# Patient Record
Sex: FEMALE | Race: WHITE | Employment: UNEMPLOYED | URBAN - METROPOLITAN AREA
[De-identification: names, ages, dates, MRNs, and addresses within clinical notes are randomized per-mention and may not be internally consistent; named-entity substitution may affect disease eponyms.]

---

## 2018-11-05 ENCOUNTER — HOSPITAL ENCOUNTER (EMERGENCY)
Age: 48
Discharge: HOME | End: 2018-11-05
Attending: EMERGENCY MEDICINE
Payer: MEDICARE

## 2018-11-05 ENCOUNTER — APPOINTMENT (OUTPATIENT)
Dept: ULTRASOUND IMAGING | Age: 48
End: 2018-11-05
Attending: EMERGENCY MEDICINE
Payer: MEDICARE

## 2018-11-05 VITALS
BODY MASS INDEX: 48.82 KG/M2 | TEMPERATURE: 98.6 F | SYSTOLIC BLOOD PRESSURE: 134 MMHG | OXYGEN SATURATION: 95 % | RESPIRATION RATE: 20 BRPM | HEART RATE: 87 BPM | WEIGHT: 293 LBS | HEIGHT: 65 IN | DIASTOLIC BLOOD PRESSURE: 79 MMHG

## 2018-11-05 DIAGNOSIS — I87.2 VENOUS STASIS DERMATITIS OF BOTH LOWER EXTREMITIES: ICD-10-CM

## 2018-11-05 DIAGNOSIS — L03.119 CELLULITIS OF LOWER EXTREMITY, UNSPECIFIED LATERALITY: ICD-10-CM

## 2018-11-05 DIAGNOSIS — R60.9 PERIPHERAL EDEMA: Primary | ICD-10-CM

## 2018-11-05 LAB
ALBUMIN SERPL-MCNC: 3.6 G/DL (ref 3.5–5)
ALBUMIN/GLOB SERPL: 0.9 {RATIO}
ALP SERPL-CCNC: 101 U/L (ref 50–130)
ALT SERPL-CCNC: 28 U/L (ref 12–65)
ANION GAP SERPL CALC-SCNC: 10 MMOL/L
AST SERPL-CCNC: 26 U/L (ref 15–37)
BASOPHILS # BLD: 0 K/UL (ref 0–0.2)
BASOPHILS NFR BLD: 0 % (ref 0–2)
BILIRUB SERPL-MCNC: 0.3 MG/DL (ref 0.2–1.1)
BNP SERPL-MCNC: 23 PG/ML
BUN SERPL-MCNC: 7 MG/DL (ref 6–23)
CALCIUM SERPL-MCNC: 9.2 MG/DL (ref 8.3–10.4)
CHLORIDE SERPL-SCNC: 100 MMOL/L (ref 98–107)
CO2 SERPL-SCNC: 27 MMOL/L (ref 21–32)
CREAT SERPL-MCNC: 0.81 MG/DL (ref 0.6–1)
CRP SERPL-MCNC: 2.5 MG/DL (ref 0–0.9)
DIFFERENTIAL METHOD BLD: ABNORMAL
EOSINOPHIL # BLD: 0 K/UL (ref 0–0.8)
EOSINOPHIL NFR BLD: 0 % (ref 0.5–7.8)
ERYTHROCYTE [DISTWIDTH] IN BLOOD BY AUTOMATED COUNT: 14.9 %
GLOBULIN SER CALC-MCNC: 4.2 G/DL (ref 2.3–3.5)
GLUCOSE SERPL-MCNC: 111 MG/DL (ref 65–100)
HCT VFR BLD AUTO: 38 % (ref 35.8–46.3)
HGB BLD-MCNC: 12 G/DL (ref 11.7–15.4)
IMM GRANULOCYTES # BLD: 0.1 K/UL (ref 0–0.5)
IMM GRANULOCYTES NFR BLD AUTO: 1 % (ref 0–5)
LYMPHOCYTES # BLD: 0.8 K/UL (ref 0.5–4.6)
LYMPHOCYTES NFR BLD: 14 % (ref 13–44)
MAGNESIUM SERPL-MCNC: 2.3 MG/DL (ref 1.8–2.4)
MCH RBC QN AUTO: 27.8 PG (ref 26.1–32.9)
MCHC RBC AUTO-ENTMCNC: 31.6 G/DL (ref 31.4–35)
MCV RBC AUTO: 88.2 FL (ref 79.6–97.8)
MONOCYTES # BLD: 0.5 K/UL (ref 0.1–1.3)
MONOCYTES NFR BLD: 8 % (ref 4–12)
NEUTS SEG # BLD: 4.6 K/UL (ref 1.7–8.2)
NEUTS SEG NFR BLD: 77 % (ref 43–78)
NRBC # BLD: 0 K/UL (ref 0–0.2)
PLATELET # BLD AUTO: 405 K/UL (ref 150–450)
PMV BLD AUTO: 8.6 FL (ref 9.4–12.3)
POTASSIUM SERPL-SCNC: 4.8 MMOL/L (ref 3.5–5.1)
PROT SERPL-MCNC: 7.8 G/DL
RBC # BLD AUTO: 4.31 M/UL (ref 4.05–5.2)
SODIUM SERPL-SCNC: 137 MMOL/L (ref 136–145)
VALPROATE SERPL-MCNC: 52 UG/ML (ref 50–100)
WBC # BLD AUTO: 6 K/UL (ref 4.3–11.1)

## 2018-11-05 PROCEDURE — 93970 EXTREMITY STUDY: CPT

## 2018-11-05 PROCEDURE — 99283 EMERGENCY DEPT VISIT LOW MDM: CPT | Performed by: EMERGENCY MEDICINE

## 2018-11-05 PROCEDURE — 80164 ASSAY DIPROPYLACETIC ACD TOT: CPT

## 2018-11-05 PROCEDURE — 86140 C-REACTIVE PROTEIN: CPT

## 2018-11-05 PROCEDURE — 83880 ASSAY OF NATRIURETIC PEPTIDE: CPT

## 2018-11-05 PROCEDURE — 74011250636 HC RX REV CODE- 250/636: Performed by: EMERGENCY MEDICINE

## 2018-11-05 PROCEDURE — 80053 COMPREHEN METABOLIC PANEL: CPT

## 2018-11-05 PROCEDURE — 85025 COMPLETE CBC W/AUTO DIFF WBC: CPT

## 2018-11-05 PROCEDURE — 74011000258 HC RX REV CODE- 258: Performed by: EMERGENCY MEDICINE

## 2018-11-05 PROCEDURE — 96365 THER/PROPH/DIAG IV INF INIT: CPT | Performed by: EMERGENCY MEDICINE

## 2018-11-05 PROCEDURE — 96375 TX/PRO/DX INJ NEW DRUG ADDON: CPT | Performed by: EMERGENCY MEDICINE

## 2018-11-05 PROCEDURE — 83735 ASSAY OF MAGNESIUM: CPT

## 2018-11-05 RX ORDER — POTASSIUM CHLORIDE 750 MG/1
10 CAPSULE, EXTENDED RELEASE ORAL 2 TIMES DAILY
Qty: 40 CAP | Refills: 0 | Status: SHIPPED | OUTPATIENT
Start: 2018-11-05

## 2018-11-05 RX ORDER — FUROSEMIDE 10 MG/ML
40 INJECTION INTRAMUSCULAR; INTRAVENOUS
Status: COMPLETED | OUTPATIENT
Start: 2018-11-05 | End: 2018-11-05

## 2018-11-05 RX ORDER — FUROSEMIDE 40 MG/1
40 TABLET ORAL DAILY
Qty: 20 TAB | Refills: 0 | Status: SHIPPED | OUTPATIENT
Start: 2018-11-05 | End: 2018-11-25

## 2018-11-05 RX ORDER — CEPHALEXIN 500 MG/1
500 CAPSULE ORAL 3 TIMES DAILY
Qty: 21 CAP | Refills: 0 | Status: SHIPPED | OUTPATIENT
Start: 2018-11-05 | End: 2018-11-12

## 2018-11-05 RX ADMIN — CEFTRIAXONE 1 G: 1 INJECTION, POWDER, FOR SOLUTION INTRAMUSCULAR; INTRAVENOUS at 14:35

## 2018-11-05 RX ADMIN — FUROSEMIDE 40 MG: 10 INJECTION, SOLUTION INTRAMUSCULAR; INTRAVENOUS at 14:33

## 2018-11-05 NOTE — PROGRESS NOTES
Call to [facility] and spoke to [facility] who asked that I fax the referral since they are not on [address] and she will get with scheduling and call me back with an appointment. Referral faxed. Call to mother and let her know but her voicemail is full.

## 2018-11-05 NOTE — ED NOTES
I have reviewed discharge instructions with the patient, mother and aunt. The patient, parent and aunt verbalized understanding. Patient left ED via Discharge Method: wheelchair to Home with mother and aunt. Opportunity for questions and clarification provided. Patient given 3 scripts. To continue your aftercare when you leave the hospital, you may receive an automated call from our care team to check in on how you are doing. This is a free service and part of our promise to provide the best care and service to meet your aftercare needs.  If you have questions, or wish to unsubscribe from this service please call [phone]. Thank you for Choosing our [facility] Emergency Department.

## 2018-11-05 NOTE — PROGRESS NOTES
Visited with patient at request of Dr. Claribel Salgado. Mother Rishi Patrick and aunt Matthew Odom ([phone]) at bedside. Patient lives at home with her mother. Per patient and mom patient is completely independent in all activities of daily living. States she uses nothing for ambulation assistance. Mom states she does not have any past medical history and only past surgical history is a hernia when she was a child. Mom states patient does not have a PCP and only sees psychiatrist Kelton Dakin every 3 months for schizophrenia. Patient responses to any questions in addition to what she voices appears to be very childlike. Her mother offers no reason for this except for the schizophrenia. I discussed follow-up with PCP from this emergency room visit and patient's mother says she goes to [facility] and would like for patient to go there as well. Notified mother I would attempt to make an appointment for patient for follow-up at the end of this week per Dr. Claribel Salgado and get back to her. Call to [facility] and spoke to [facility] who asked that I fax the referral since they are not on Care and she will get with scheduling and call me back with an appointment. mother and let her know but her voicemail is full.

## 2018-11-05 NOTE — ED TRIAGE NOTES
Pt has had swelling to bilateral feet and legs for about two weeks but getting worse. Some redness to legs also.

## 2018-11-05 NOTE — ED PROVIDER NOTES
Patient presents to ER with family for concern about leg swelling. Family states patient \"have swelling in bilateral legs approximately 2 weeks ago. She had been prescribed \"fluid pills\" by her psychiatrist without any improvement. The history is provided by the patient. The history is limited by a developmental delay. Leg Pain This is a new problem. The current episode started more than 1 week ago. The problem has not changed since onset. The pain is present in the right lower leg and left lower leg. The quality of the pain is described as aching. The pain is at a severity of 5/10. The pain is moderate. Pertinent negatives include no numbness, no tingling and no back pain. She has tried nothing for the symptoms. There has been no history of extremity trauma. History reviewed. No pertinent past medical history. History reviewed. No pertinent surgical history. History reviewed. No pertinent family history. Social History Socioeconomic History  Marital status: SINGLE Spouse name: Not on file  Number of children: Not on file  Years of education: Not on file  Highest education level: Not on file Social Needs  Financial resource strain: Not on file  Food insecurity - worry: Not on file  Food insecurity - inability: Not on file  Transportation needs - medical: Not on file  Transportation needs - non-medical: Not on file Occupational History  Not on file Tobacco Use  Smoking status: Never Smoker Substance and Sexual Activity  Alcohol use: No  
  Frequency: Never  Drug use: Not on file  Sexual activity: Not on file Other Topics Concern  Not on file Social History Narrative  Not on file ALLERGIES: Patient has no known allergies. Review of Systems Constitutional: Negative for diaphoresis and fatigue. HENT: Negative for congestion and dental problem. Eyes: Negative for photophobia, redness and visual disturbance. Respiratory: Negative for choking and chest tightness. Cardiovascular: Negative for palpitations and leg swelling. Gastrointestinal: Negative for abdominal pain and anal bleeding. Genitourinary: Negative for flank pain, frequency and urgency. Musculoskeletal: Negative for back pain and gait problem. Skin: Positive for color change. Neurological: Negative for tingling, speech difficulty and numbness. Hematological: Negative for adenopathy. Does not bruise/bleed easily. Psychiatric/Behavioral: Negative for behavioral problems and confusion. All other systems reviewed and are negative. Vitals:  
 11/05/18 1133 BP: 134/79 Pulse: (!) 116 Resp: 20 Temp: 98.6 °F (37 °C) SpO2: 92% Weight: 148.8 kg (328 lb) Height: 5' 5\" (1.651 m) Physical Exam  
Constitutional: She appears well-developed and well-nourished. HENT:  
Head: Normocephalic and atraumatic. Eyes: EOM are normal. Pupils are equal, round, and reactive to light. Cardiovascular: Normal rate and regular rhythm. Pulmonary/Chest: Effort normal and breath sounds normal. No stridor. No respiratory distress. Musculoskeletal: Normal range of motion. She exhibits edema. She exhibits no deformity. Right lower leg: She exhibits swelling and edema. Left lower leg: She exhibits swelling and edema. Legs: 
Nursing note and vitals reviewed. MDM Number of Diagnoses or Management Options Diagnosis management comments: Differential diagnosis includes DVT, volume overload 2:42 PM 
Ultrasound is negative for DVT. Normal basic labs including a normal white blood cell count. Normal kidney function. CRP is only 2. Discussed results of labs in detail with patient and family. I feel patient likely needs diuresis as well as antibiotics for possible dermatitis versus cellulitis. Discharged home on Lasix as well as Keflex. Encouraged close follow-up with PCP Amount and/or Complexity of Data Reviewed Clinical lab tests: ordered and reviewed Tests in the radiology section of CPT®: ordered and reviewed Risk of Complications, Morbidity, and/or Mortality Presenting problems: moderate Diagnostic procedures: low Management options: low Procedures Voice dictation software was used during the making of this note. This software is not perfect and grammatical and other typographical errors may be present. This note has been proofread, but may still contain errors.  
Mayra Amaro MD; 11/5/2018 @2:42 PM  
===================================================================

## 2018-11-05 NOTE — DISCHARGE INSTRUCTIONS
Take medications as prescribed  Keep legs elevated when sitting  Call and arrange follow-up with a primary care physician  Return to the ER for any new or worsening symptoms     Cellulitis: Care Instructions  Your Care Instructions    Cellulitis is a skin infection caused by bacteria, most often strep or staph. It often occurs after a break in the skin from a scrape, cut, bite, or puncture, or after a rash. Cellulitis may be treated without doing tests to find out what caused it. But your doctor may do tests, if needed, to look for a specific bacteria, like methicillin-resistant Staphylococcus aureus (MRSA). The doctor has checked you carefully, but problems can develop later. If you notice any problems or new symptoms, get medical treatment right away. Follow-up care is a key part of your treatment and safety. Be sure to make and go to all appointments, and call your doctor if you are having problems. It's also a good idea to know your test results and keep a list of the medicines you take. How can you care for yourself at home? · Take your antibiotics as directed. Do not stop taking them just because you feel better. You need to take the full course of antibiotics. · Prop up the infected area on pillows to reduce pain and swelling. Try to keep the area above the level of your heart as often as you can. · If your doctor told you how to care for your wound, follow your doctor's instructions. If you did not get instructions, follow this general advice:  ? Wash the wound with clean water 2 times a day. Don't use hydrogen peroxide or alcohol, which can slow healing. ? You may cover the wound with a thin layer of petroleum jelly, such as Vaseline, and a nonstick bandage. ? Apply more petroleum jelly and replace the bandage as needed. · Be safe with medicines. Take pain medicines exactly as directed. ? If the doctor gave you a prescription medicine for pain, take it as prescribed.   ? If you are not taking a prescription pain medicine, ask your doctor if you can take an over-the-counter medicine. To prevent cellulitis in the future  · Try to prevent cuts, scrapes, or other injuries to your skin. Cellulitis most often occurs where there is a break in the skin. · If you get a scrape, cut, mild burn, or bite, wash the wound with clean water as soon as you can to help avoid infection. Don't use hydrogen peroxide or alcohol, which can slow healing. · If you have swelling in your legs (edema), support stockings and good skin care may help prevent leg sores and cellulitis. · Take care of your feet, especially if you have diabetes or other conditions that increase the risk of infection. Wear shoes and socks. Do not go barefoot. If you have athlete's foot or other skin problems on your feet, talk to your doctor about how to treat them. When should you call for help? Call your doctor now or seek immediate medical care if:    · You have signs that your infection is getting worse, such as:  ? Increased pain, swelling, warmth, or redness. ? Red streaks leading from the area. ? Pus draining from the area. ? A fever.     · You get a rash.    Watch closely for changes in your health, and be sure to contact your doctor if:    · You do not get better as expected. Where can you learn more? Go to http://ernestina-rachel.info/. Leilani Antunez in the search box to learn more about \"Cellulitis: Care Instructions. \"  Current as of: April 18, 2018  Content Version: 11.8  © 8605-4175 [employer]. Care instructions adapted under license by [employer] (which disclaims liability or warranty for this information). If you have questions about a medical condition or this instruction, always ask your healthcare professional. [place] [address] any warranty or liability for your use of this information.            Leg and Ankle Edema: Care Instructions  Your Care Instructions  Swelling in the legs, ankles, and feet is called edema. It is common after you sit or stand for a while. Long plane flights or car rides often cause swelling in the legs and feet. You may also have swelling if you have to stand for long periods of time at your job. Problems with the veins in the legs (varicose veins) and changes in hormones can also cause swelling. Sometimes the swelling in the ankles and feet is caused by a more serious problem, such as heart failure, infection, blood clots, or liver or kidney disease. Follow-up care is a key part of your treatment and safety. Be sure to make and go to all appointments, and call your doctor if you are having problems. It's also a good idea to know your test results and keep a list of the medicines you take. How can you care for yourself at home? · If your doctor gave you medicine, take it as prescribed. Call your doctor if you think you are having a problem with your medicine. · Whenever you are resting, raise your legs up. Try to keep the swollen area higher than the level of your heart. · Take breaks from standing or sitting in one position. ? Walk around to increase the blood flow in your lower legs. ? Move your feet and ankles often while you stand, or tighten and relax your leg muscles. · Wear support stockings. Put them on in the morning, before swelling gets worse. · Eat a balanced diet. Lose weight if you need to. · Limit the amount of salt (sodium) in your diet. Salt holds fluid in the body and may increase swelling. When should you call for help? Call 911 anytime you think you may need emergency care. For example, call if:    · You have symptoms of a blood clot in your lung (called a pulmonary embolism). These may include:  ? Sudden chest pain. ? Trouble breathing. ?  Coughing up blood.    Call your doctor now or seek immediate medical care if:    · You have signs of a blood clot, such as:  ? Pain in your calf, back of the knee, thigh, or groin.  ? Redness and swelling in your leg or groin.     · You have symptoms of infection, such as:  ? Increased pain, swelling, warmth, or redness. ? Red streaks or pus. ? A fever.    Watch closely for changes in your health, and be sure to contact your doctor if:    · Your swelling is getting worse.     · You have new or worsening pain in your legs.     · You do not get better as expected. Where can you learn more? Go to http://ernestina-rachel.info/. Enter B331 in the search box to learn more about \"Leg and Ankle Edema: Care Instructions. \"  Current as of: November 20, 2017  Content Version: 11.8  © 6840-0029 [employer]. Care instructions adapted under license by [employer] (which disclaims liability or warranty for this information). If you have questions about a medical condition or this instruction, always ask your healthcare professional. [place] [address] any warranty or liability for your use of this information.

## 2018-11-07 NOTE — PROGRESS NOTES
Maria L Lou called from [facility] and gave appointment for Thursday, 11/8 @ 1:30pm.  Called to patient's mom and her sister Lupillo Perez answered. States she will have to transport and is suppose to  her granddaughter then. Asked if there was anyway they could go on Friday. Notified I would call and see. Confirmed that she would be able to go to Thursday appointment if not - aunt states 'yes I will make it work'. Call to [facility] and spoke to [facility] who states they have no appointments available on Friday. Confirmed that patient would keep 1:30 appointment tomorrow and are booking new patients out until January. Called patient back to inform and spoke to aunt Lary Oliva again who confirms she will have patient at [place] location at [employer].

## 2021-04-09 ENCOUNTER — APPOINTMENT (OUTPATIENT)
Dept: GENERAL RADIOLOGY | Age: 51
End: 2021-04-09
Attending: EMERGENCY MEDICINE
Payer: MEDICARE

## 2021-04-09 ENCOUNTER — APPOINTMENT (OUTPATIENT)
Dept: CT IMAGING | Age: 51
End: 2021-04-09
Attending: EMERGENCY MEDICINE
Payer: MEDICARE

## 2021-04-09 ENCOUNTER — HOSPITAL ENCOUNTER (EMERGENCY)
Age: 51
Discharge: HOME | End: 2021-04-09
Attending: EMERGENCY MEDICINE
Payer: MEDICARE

## 2021-04-09 VITALS
OXYGEN SATURATION: 97 % | DIASTOLIC BLOOD PRESSURE: 74 MMHG | HEART RATE: 78 BPM | SYSTOLIC BLOOD PRESSURE: 142 MMHG | TEMPERATURE: 98.3 F | RESPIRATION RATE: 20 BRPM

## 2021-04-09 DIAGNOSIS — R53.83 FATIGUE, UNSPECIFIED TYPE: ICD-10-CM

## 2021-04-09 DIAGNOSIS — S91.209A TOENAIL AVULSION, INITIAL ENCOUNTER: Primary | ICD-10-CM

## 2021-04-09 LAB
ALBUMIN SERPL-MCNC: 3.4 G/DL (ref 3.5–5)
ALBUMIN/GLOB SERPL: 0.8 {RATIO} (ref 1.2–3.5)
ALP SERPL-CCNC: 90 U/L (ref 50–136)
ALT SERPL-CCNC: 24 U/L (ref 12–65)
ANION GAP SERPL CALC-SCNC: 5 MMOL/L (ref 7–16)
AST SERPL-CCNC: 35 U/L (ref 15–37)
BASOPHILS # BLD: 0 K/UL (ref 0–0.2)
BASOPHILS NFR BLD: 0 % (ref 0–2)
BILIRUB SERPL-MCNC: 0.4 MG/DL (ref 0.2–1.1)
BUN SERPL-MCNC: 16 MG/DL (ref 6–23)
CALCIUM SERPL-MCNC: 9.6 MG/DL (ref 8.3–10.4)
CHLORIDE SERPL-SCNC: 99 MMOL/L (ref 98–107)
CO2 SERPL-SCNC: 33 MMOL/L (ref 21–32)
CREAT SERPL-MCNC: 1.15 MG/DL (ref 0.6–1)
DIFFERENTIAL METHOD BLD: ABNORMAL
EOSINOPHIL # BLD: 0 K/UL (ref 0–0.8)
EOSINOPHIL NFR BLD: 0 % (ref 0.5–7.8)
ERYTHROCYTE [DISTWIDTH] IN BLOOD BY AUTOMATED COUNT: 14.6 % (ref 11.9–14.6)
GLOBULIN SER CALC-MCNC: 4.2 G/DL (ref 2.3–3.5)
GLUCOSE SERPL-MCNC: 105 MG/DL (ref 65–100)
HCT VFR BLD AUTO: 36.3 % (ref 35.8–46.3)
HGB BLD-MCNC: 11.7 G/DL (ref 11.7–15.4)
IMM GRANULOCYTES # BLD AUTO: 0.1 K/UL (ref 0–0.5)
IMM GRANULOCYTES NFR BLD AUTO: 1 % (ref 0–5)
LYMPHOCYTES # BLD: 0.9 K/UL (ref 0.5–4.6)
LYMPHOCYTES NFR BLD: 11 % (ref 13–44)
MAGNESIUM SERPL-MCNC: 2.1 MG/DL (ref 1.8–2.4)
MCH RBC QN AUTO: 29.7 PG (ref 26.1–32.9)
MCHC RBC AUTO-ENTMCNC: 32.2 G/DL (ref 31.4–35)
MCV RBC AUTO: 92.1 FL (ref 79.6–97.8)
MONOCYTES # BLD: 0.9 K/UL (ref 0.1–1.3)
MONOCYTES NFR BLD: 11 % (ref 4–12)
NEUTS SEG # BLD: 6.2 K/UL (ref 1.7–8.2)
NEUTS SEG NFR BLD: 77 % (ref 43–78)
NRBC # BLD: 0 K/UL (ref 0–0.2)
PLATELET # BLD AUTO: 299 K/UL (ref 150–450)
PMV BLD AUTO: 9.3 FL (ref 9.4–12.3)
POTASSIUM SERPL-SCNC: 3.8 MMOL/L (ref 3.5–5.1)
PROT SERPL-MCNC: 7.6 G/DL (ref 6.3–8.2)
RBC # BLD AUTO: 3.94 M/UL (ref 4.05–5.2)
SODIUM SERPL-SCNC: 137 MMOL/L (ref 136–145)
TSH SERPL DL<=0.005 MIU/L-ACNC: 8.85 UIU/ML
WBC # BLD AUTO: 8.1 K/UL (ref 4.3–11.1)

## 2021-04-09 PROCEDURE — 85025 COMPLETE CBC W/AUTO DIFF WBC: CPT

## 2021-04-09 PROCEDURE — 70450 CT HEAD/BRAIN W/O DYE: CPT

## 2021-04-09 PROCEDURE — 81003 URINALYSIS AUTO W/O SCOPE: CPT

## 2021-04-09 PROCEDURE — 99283 EMERGENCY DEPT VISIT LOW MDM: CPT

## 2021-04-09 PROCEDURE — 84443 ASSAY THYROID STIM HORMONE: CPT

## 2021-04-09 PROCEDURE — 80053 COMPREHEN METABOLIC PANEL: CPT

## 2021-04-09 PROCEDURE — 73630 X-RAY EXAM OF FOOT: CPT

## 2021-04-09 PROCEDURE — 83735 ASSAY OF MAGNESIUM: CPT

## 2021-04-09 RX ORDER — SODIUM CHLORIDE 0.9 % (FLUSH) 0.9 %
5-40 SYRINGE (ML) INJECTION AS NEEDED
Status: DISCONTINUED | OUTPATIENT
Start: 2021-04-09 | End: 2021-04-09 | Stop reason: HOSPADM

## 2021-04-09 RX ORDER — SODIUM CHLORIDE 0.9 % (FLUSH) 0.9 %
5-40 SYRINGE (ML) INJECTION EVERY 8 HOURS
Status: DISCONTINUED | OUTPATIENT
Start: 2021-04-09 | End: 2021-04-09 | Stop reason: HOSPADM

## 2021-04-09 NOTE — PROGRESS NOTES
Referral per MD.  Terri Ferrera spoke with pt's aunt Ming De La O  [phone] ) at bedside who provides all hx. Pt appears very child-like, smiles & is asking for a cheeseburger. Ms. Joe Barber states pt lives with her mom Sahil Diaz  [phone] ). Pt receives a social security disability check approx [phone], has Medicare & used to have Medicaid but no longer has. Aunt does not know why pt lost her Medicaid. Aunt states she helps care for both pt & her mom Carolina Aragon ) who is in poor health herself. Aunt does grocery shopping, gets prescriptions & other household chores. SW discussed that at this time it was unknown if pt would be admitted but in the event she is not SW offered option of HH. Aunt confirms pt had [facility] in the past.      SW explained at length to aunt that since pt refuses to go for PCP appts [facility] ) has not seen pt in over 2 yrs they may not approve [place] orders till pt is seen. Pt's psychiatrist is Dr. Mamta St, per aunt pt's meds are all oral.    Aunt states pt's mom is also seen at [facility] & she will call & hopefully they will agree to follow [place] orders. SW made referral to [facility], clinical & orders faxed ( RN, PT, OT, & SW ). HH SW needs to assist family with pt getting on Medicaid. Aunt's contact # was provided to [facility].

## 2021-04-09 NOTE — ED NOTES
I have reviewed discharge instructions with the patient. The parent verbalized understanding. Patient left ED via Discharge Method: stretcher to Home with [facility]. Opportunity for questions and clarification provided. Patient given 0 scripts. To continue your aftercare when you leave the hospital, you may receive an automated call from our care team to check in on how you are doing. This is a free service and part of our promise to provide the best care and service to meet your aftercare needs.  If you have questions, or wish to unsubscribe from this service please call [phone]. Thank you for Choosing our [facility] Emergency Department.

## 2021-04-09 NOTE — ED TRIAGE NOTES
Pt to ED via EMS for increased weakness, UTI, and inability for mother to care for her anymore. EMS VSS. Pt is visibly anxious. Pt a poor historian. Masked.

## 2021-04-09 NOTE — ED PROVIDER NOTES
Per nurse's notes: \"Pt to ED via EMS for increased weakness, UTI, and inability for mother to care for her anymore. EMS VSS. Pt is visibly anxious. Pt a poor historian. Masked. \"    Patient denies any pain, but does states she fell out of bed today injuring her left great toe. Patient states she normally ambulates around the house without difficulty. According to patient's aunt, the patient has had worsening fatigue and has not been getting up much at all for the last 2 weeks. The history is provided by the patient and the EMS personnel. The history is limited by the condition of the patient. Fatigue  This is a chronic problem. The current episode started more than 1 week ago. The problem has been gradually worsening. There was no focality noted. Pertinent negatives include no focal weakness, no loss of sensation, no loss of balance, no slurred speech, no speech difficulty, no memory loss, no visual change, no auditory change, no mental status change and no disorientation. There has been no fever. Pertinent negatives include no shortness of breath, no chest pain, no vomiting, no altered mental status, no confusion, no headaches, no choking, no nausea, no bowel incontinence and no bladder incontinence. There were no medications administered prior to arrival. Associated medical issues do not include trauma, mood changes, bleeding disorder, seizures, dementia, CVA or clotting disorder. No past medical history on file. No past surgical history on file. No family history on file.     Social History     Socioeconomic History    Marital status: SINGLE     Spouse name: Not on file    Number of children: Not on file    Years of education: Not on file    Highest education level: Not on file   Occupational History    Not on file   Social Needs    Financial resource strain: Not on file    Food insecurity     Worry: Not on file     Inability: Not on file    Transportation needs     Medical: Not on file     Non-medical: Not on file   Tobacco Use    Smoking status: Never Smoker   Substance and Sexual Activity    Alcohol use: No     Frequency: Never    Drug use: Not on file    Sexual activity: Not on file   Lifestyle    Physical activity     Days per week: Not on file     Minutes per session: Not on file    Stress: Not on file   Relationships    Social connections     Talks on phone: Not on file     Gets together: Not on file     Attends Hindu service: Not on file     Active member of club or organization: Not on file     Attends meetings of clubs or organizations: Not on file     Relationship status: Not on file    Intimate partner violence     Fear of current or ex partner: Not on file     Emotionally abused: Not on file     Physically abused: Not on file     Forced sexual activity: Not on file   Other Topics Concern    Not on file   Social History Narrative    Not on file         ALLERGIES: Patient has no known allergies. Review of Systems   Constitutional: Positive for fatigue. Negative for activity change, appetite change, chills and fever. Respiratory: Negative for choking and shortness of breath. Cardiovascular: Negative for chest pain. Gastrointestinal: Negative for bowel incontinence, nausea and vomiting. Genitourinary: Negative for bladder incontinence. Musculoskeletal: Negative for arthralgias, back pain and gait problem. Skin: Positive for wound (Injury to left great toe). Neurological: Negative for focal weakness, speech difficulty, headaches and loss of balance. Psychiatric/Behavioral: Negative for confusion and memory loss. All other systems reviewed and are negative. There were no vitals filed for this visit. Physical Exam  Vitals signs and nursing note reviewed. Constitutional:       General: She is not in acute distress. Appearance: She is well-developed. She is morbidly obese. HENT:      Head: Normocephalic and atraumatic.       Right Ear: External ear normal.      Left Ear: External ear normal.   Eyes:      Extraocular Movements: Extraocular movements intact. Conjunctiva/sclera: Conjunctivae normal.      Pupils: Pupils are equal, round, and reactive to light. Neck:      Musculoskeletal: Normal range of motion and neck supple. Cardiovascular:      Rate and Rhythm: Normal rate and regular rhythm. Heart sounds: Normal heart sounds. Pulmonary:      Effort: Pulmonary effort is normal.      Breath sounds: Normal breath sounds. Abdominal:      General: Bowel sounds are normal.      Palpations: Abdomen is soft. Tenderness: There is no abdominal tenderness. There is no right CVA tenderness or left CVA tenderness. Musculoskeletal: Normal range of motion. Comments: Patient with chronic lymphedema bilateral lower extremities   Skin:     General: Skin is warm and dry. Capillary Refill: Capillary refill takes less than 2 seconds. Comments: Left great toe reveals no obvious deformity but does have a avulsion of the left great toenail being held on only by the lateral aspect of the nail, exposing the entire nail bed. Neurological:      General: No focal deficit present. Mental Status: She is alert and oriented to person, place, and time. Cranial Nerves: Cranial nerves are intact. Sensory: Sensation is intact. Motor: Motor function is intact.    Psychiatric:         Mood and Affect: Mood and affect normal.         Speech: Speech normal.         Cognition and Memory: Cognition and memory normal.      Comments: Poor historian          MDM  Number of Diagnoses or Management Options  Fatigue, unspecified type: new and requires workup  Toenail avulsion, initial encounter: new and requires workup     Amount and/or Complexity of Data Reviewed  Clinical lab tests: ordered and reviewed  Tests in the radiology section of CPT®: ordered and reviewed  Tests in the medicine section of CPT®: ordered and reviewed  Obtain history from someone other than the patient: yes  Review and summarize past medical records: yes  Independent visualization of images, tracings, or specimens: yes    Risk of Complications, Morbidity, and/or Mortality  Presenting problems: high  Diagnostic procedures: moderate  Management options: moderate    Patient Progress  Patient progress: stable         Procedures    The patient was observed in the ED. Lab work and CT unremarkable. Case was discussed with case management who will arrange home health care tomorrow. Results Reviewed:      Recent Results (from the past 24 hour(s))   CBC WITH AUTOMATED DIFF    Collection Time: 04/09/21  2:53 PM   Result Value Ref Range    WBC 8.1 4.3 - 11.1 K/uL    RBC 3.94 (L) 4.05 - 5.2 M/uL    HGB 11.7 11.7 - 15.4 g/dL    HCT 36.3 35.8 - 46.3 %    MCV 92.1 79.6 - 97.8 FL    MCH 29.7 26.1 - 32.9 PG    MCHC 32.2 31.4 - 35.0 g/dL    RDW 14.6 11.9 - 14.6 %    PLATELET 940 869 - 403 K/uL    MPV 9.3 (L) 9.4 - 12.3 FL    ABSOLUTE NRBC 0.00 0.0 - 0.2 K/uL    DF AUTOMATED      NEUTROPHILS 77 43 - 78 %    LYMPHOCYTES 11 (L) 13 - 44 %    MONOCYTES 11 4.0 - 12.0 %    EOSINOPHILS 0 (L) 0.5 - 7.8 %    BASOPHILS 0 0.0 - 2.0 %    IMMATURE GRANULOCYTES 1 0.0 - 5.0 %    ABS. NEUTROPHILS 6.2 1.7 - 8.2 K/UL    ABS. LYMPHOCYTES 0.9 0.5 - 4.6 K/UL    ABS. MONOCYTES 0.9 0.1 - 1.3 K/UL    ABS. EOSINOPHILS 0.0 0.0 - 0.8 K/UL    ABS. BASOPHILS 0.0 0.0 - 0.2 K/UL    ABS. IMM.  GRANS. 0.1 0.0 - 0.5 K/UL   METABOLIC PANEL, COMPREHENSIVE    Collection Time: 04/09/21  2:53 PM   Result Value Ref Range    Sodium 137 136 - 145 mmol/L    Potassium 3.8 3.5 - 5.1 mmol/L    Chloride 99 98 - 107 mmol/L    CO2 33 (H) 21 - 32 mmol/L    Anion gap 5 (L) 7 - 16 mmol/L    Glucose 105 (H) 65 - 100 mg/dL    BUN 16 6 - 23 MG/DL    Creatinine 1.15 (H) 0.6 - 1.0 MG/DL    GFR est AA >60 >60 ml/min/1.73m2    GFR est non-AA 53 (L) >60 ml/min/1.73m2    Calcium 9.6 8.3 - 10.4 MG/DL    Bilirubin, total 0.4 0.2 - 1.1 MG/DL    ALT (SGPT) 24 12 - 65 U/L    AST (SGOT) 35 15 - 37 U/L    Alk. phosphatase 90 50 - 136 U/L    Protein, total 7.6 6.3 - 8.2 g/dL    Albumin 3.4 (L) 3.5 - 5.0 g/dL    Globulin 4.2 (H) 2.3 - 3.5 g/dL    A-G Ratio 0.8 (L) 1.2 - 3.5     MAGNESIUM    Collection Time: 04/09/21  2:53 PM   Result Value Ref Range    Magnesium 2.1 1.8 - 2.4 mg/dL   TSH 3RD GENERATION    Collection Time: 04/09/21  2:53 PM   Result Value Ref Range    TSH 8.850 uIU/mL     CT HEAD WO CONT   Final Result   Unremarkable unenhanced CT scan of the brain. XR FOOT LT MIN 3 V   Final Result   Unremarkable exam.             I discussed the results of all labs, procedures, radiographs, and treatments with the patient and available family. Treatment plan is agreed upon and the patient is ready for discharge. All voiced understanding of the discharge plan and medication instructions or changes as appropriate. Questions about treatment in the ED were answered. All were encouraged to return should symptoms worsen or new problems develop.

## 2021-04-12 NOTE — PROGRESS NOTES
AYANNA spoke with [facility] intake nurse at [facility] [phone] who states has spoken with pt's aunt Antonio Batch [phone] ) who was agreeable to a referral to [facility]. Once pt is seen by [facility] then they will sign  orders for [facility] [place] to follow pt. [place]  with [facility] agrees to continue to follow up with pt's aunt.

## 2021-04-17 ENCOUNTER — HOSPITAL ENCOUNTER (EMERGENCY)
Age: 51
Discharge: HOME | End: 2021-04-17
Attending: EMERGENCY MEDICINE
Payer: MEDICARE

## 2021-04-17 ENCOUNTER — APPOINTMENT (OUTPATIENT)
Dept: GENERAL RADIOLOGY | Age: 51
End: 2021-04-17
Attending: EMERGENCY MEDICINE
Payer: MEDICARE

## 2021-04-17 VITALS
RESPIRATION RATE: 17 BRPM | HEIGHT: 66 IN | OXYGEN SATURATION: 97 % | WEIGHT: 293 LBS | BODY MASS INDEX: 47.09 KG/M2 | TEMPERATURE: 97.9 F | DIASTOLIC BLOOD PRESSURE: 86 MMHG | SYSTOLIC BLOOD PRESSURE: 184 MMHG | HEART RATE: 114 BPM

## 2021-04-17 DIAGNOSIS — L03.116 CELLULITIS OF LEFT LOWER EXTREMITY: Primary | ICD-10-CM

## 2021-04-17 LAB
ALBUMIN SERPL-MCNC: 3.3 G/DL (ref 3.5–5)
ALBUMIN/GLOB SERPL: 0.7 {RATIO} (ref 1.2–3.5)
ALP SERPL-CCNC: 87 U/L (ref 50–136)
ALT SERPL-CCNC: 24 U/L (ref 12–65)
ANION GAP SERPL CALC-SCNC: 6 MMOL/L (ref 7–16)
AST SERPL-CCNC: 24 U/L (ref 15–37)
BASOPHILS # BLD: 0 K/UL (ref 0–0.2)
BASOPHILS NFR BLD: 1 % (ref 0–2)
BILIRUB SERPL-MCNC: 0.2 MG/DL (ref 0.2–1.1)
BNP SERPL-MCNC: 99 PG/ML (ref 5–125)
BUN SERPL-MCNC: 13 MG/DL (ref 6–23)
CALCIUM SERPL-MCNC: 9.5 MG/DL (ref 8.3–10.4)
CHLORIDE SERPL-SCNC: 101 MMOL/L (ref 98–107)
CO2 SERPL-SCNC: 30 MMOL/L (ref 21–32)
CREAT SERPL-MCNC: 0.91 MG/DL (ref 0.6–1)
DIFFERENTIAL METHOD BLD: ABNORMAL
EOSINOPHIL # BLD: 0.1 K/UL (ref 0–0.8)
EOSINOPHIL NFR BLD: 1 % (ref 0.5–7.8)
ERYTHROCYTE [DISTWIDTH] IN BLOOD BY AUTOMATED COUNT: 15.3 % (ref 11.9–14.6)
GLOBULIN SER CALC-MCNC: 4.5 G/DL (ref 2.3–3.5)
GLUCOSE SERPL-MCNC: 106 MG/DL (ref 65–100)
HCT VFR BLD AUTO: 40.3 % (ref 35.8–46.3)
HGB BLD-MCNC: 12.7 G/DL (ref 11.7–15.4)
IMM GRANULOCYTES # BLD AUTO: 0.1 K/UL (ref 0–0.5)
IMM GRANULOCYTES NFR BLD AUTO: 1 % (ref 0–5)
LACTATE SERPL-SCNC: 2 MMOL/L (ref 0.4–2)
LYMPHOCYTES # BLD: 1 K/UL (ref 0.5–4.6)
LYMPHOCYTES NFR BLD: 15 % (ref 13–44)
MCH RBC QN AUTO: 29.7 PG (ref 26.1–32.9)
MCHC RBC AUTO-ENTMCNC: 31.5 G/DL (ref 31.4–35)
MCV RBC AUTO: 94.4 FL (ref 79.6–97.8)
MONOCYTES # BLD: 0.6 K/UL (ref 0.1–1.3)
MONOCYTES NFR BLD: 9 % (ref 4–12)
NEUTS SEG # BLD: 5.1 K/UL (ref 1.7–8.2)
NEUTS SEG NFR BLD: 74 % (ref 43–78)
NRBC # BLD: 0 K/UL (ref 0–0.2)
PLATELET # BLD AUTO: 261 K/UL (ref 150–450)
PMV BLD AUTO: 8.7 FL (ref 9.4–12.3)
POTASSIUM SERPL-SCNC: 4.2 MMOL/L (ref 3.5–5.1)
PROT SERPL-MCNC: 7.8 G/DL (ref 6.3–8.2)
RBC # BLD AUTO: 4.27 M/UL (ref 4.05–5.2)
SODIUM SERPL-SCNC: 137 MMOL/L (ref 136–145)
WBC # BLD AUTO: 7 K/UL (ref 4.3–11.1)

## 2021-04-17 PROCEDURE — 99285 EMERGENCY DEPT VISIT HI MDM: CPT

## 2021-04-17 PROCEDURE — 93005 ELECTROCARDIOGRAM TRACING: CPT | Performed by: EMERGENCY MEDICINE

## 2021-04-17 PROCEDURE — 71045 X-RAY EXAM CHEST 1 VIEW: CPT

## 2021-04-17 PROCEDURE — 80053 COMPREHEN METABOLIC PANEL: CPT

## 2021-04-17 PROCEDURE — 83605 ASSAY OF LACTIC ACID: CPT

## 2021-04-17 PROCEDURE — 83880 ASSAY OF NATRIURETIC PEPTIDE: CPT

## 2021-04-17 PROCEDURE — 85025 COMPLETE CBC W/AUTO DIFF WBC: CPT

## 2021-04-17 PROCEDURE — 74011250636 HC RX REV CODE- 250/636: Performed by: EMERGENCY MEDICINE

## 2021-04-17 PROCEDURE — 87040 BLOOD CULTURE FOR BACTERIA: CPT

## 2021-04-17 RX ORDER — CEPHALEXIN 500 MG/1
500 CAPSULE ORAL 4 TIMES DAILY
Qty: 28 CAP | Refills: 0 | Status: SHIPPED | OUTPATIENT
Start: 2021-04-17 | End: 2021-04-24

## 2021-04-17 RX ADMIN — SODIUM CHLORIDE, POTASSIUM CHLORIDE, SODIUM LACTATE AND CALCIUM CHLORIDE 1000 ML: 600; 310; 30; 20 INJECTION, SOLUTION INTRAVENOUS at 22:07

## 2021-04-18 LAB
ATRIAL RATE: 114 BPM
CALCULATED P AXIS, ECG09: 56 DEGREES
CALCULATED R AXIS, ECG10: 36 DEGREES
CALCULATED T AXIS, ECG11: 32 DEGREES
DIAGNOSIS, 93000: NORMAL
P-R INTERVAL, ECG05: 156 MS
Q-T INTERVAL, ECG07: 294 MS
QRS DURATION, ECG06: 104 MS
QTC CALCULATION (BEZET), ECG08: 405 MS
VENTRICULAR RATE, ECG03: 114 BPM

## 2021-04-18 NOTE — ED NOTES
Pt states that she had her left great toe nail removed last seek and now has swelling and redness to the left lower leg. Child is mentally challenged.   Aunt with the child

## 2021-04-18 NOTE — ED PROVIDER NOTES
Patient avulsed her left great toe nail 1 week ago seen at the ER. Toenail was completely removed. Patient has bilateral lower extremity swelling with mild redness to both anterior legs. Swelling has gotten slightly worse over the past couple of days with some slight increased pain bilaterally slightly worse on the left. Denies any chest pain or shortness of breath. Family concerned about possible infection. The history is provided by the patient and a relative. No  was used. Foot Pain   This is a new problem. The current episode started more than 2 days ago. The problem occurs constantly. The problem has not changed since onset. The pain is present in the right lower leg and left lower leg. The quality of the pain is described as aching. The pain is mild. Pertinent negatives include no numbness, no back pain and no neck pain. The symptoms are aggravated by standing. She has tried OTC pain medications for the symptoms. The treatment provided mild relief. There has been a history of trauma. Past Medical History:   Diagnosis Date    Psychiatric disorder        Past Surgical History:   Procedure Laterality Date    HX GI      umbilical hernia repair         History reviewed. No pertinent family history.     Social History     Socioeconomic History    Marital status: SINGLE     Spouse name: Not on file    Number of children: Not on file    Years of education: Not on file    Highest education level: Not on file   Occupational History    Not on file   Social Needs    Financial resource strain: Not on file    Food insecurity     Worry: Not on file     Inability: Not on file    Transportation needs     Medical: Not on file     Non-medical: Not on file   Tobacco Use    Smoking status: Never Smoker    Smokeless tobacco: Never Used   Substance and Sexual Activity    Alcohol use: No     Frequency: Never    Drug use: Not on file    Sexual activity: Not on file   Lifestyle    Physical activity     Days per week: Not on file     Minutes per session: Not on file    Stress: Not on file   Relationships    Social connections     Talks on phone: Not on file     Gets together: Not on file     Attends Protestant service: Not on file     Active member of club or organization: Not on file     Attends meetings of clubs or organizations: Not on file     Relationship status: Not on file    Intimate partner violence     Fear of current or ex partner: Not on file     Emotionally abused: Not on file     Physically abused: Not on file     Forced sexual activity: Not on file   Other Topics Concern    Not on file   Social History Narrative    Not on file         ALLERGIES: Pcn [penicillins]    Review of Systems   Constitutional: Negative for chills and fever. HENT: Negative for rhinorrhea and sore throat. Eyes: Negative for pain and redness. Respiratory: Negative for chest tightness, shortness of breath and wheezing. Cardiovascular: Positive for leg swelling. Negative for chest pain. Gastrointestinal: Negative for abdominal pain, diarrhea, nausea and vomiting. Genitourinary: Negative for dysuria and hematuria. Musculoskeletal: Negative for back pain, neck pain and neck stiffness. Skin: Positive for color change and wound. Negative for rash. Neurological: Negative for weakness, numbness and headaches. Vitals:    04/17/21 2027   BP: (!) 144/93   Pulse: (!) 122   Resp: 22   Temp: 97.9 °F (36.6 °C)   SpO2: 93%   Weight: 147.4 kg (325 lb)   Height: 5' 6\" (1.676 m)            Physical Exam  Constitutional:       Appearance: She is well-developed. She is obese. HENT:      Head: Normocephalic and atraumatic. Neck:      Musculoskeletal: Normal range of motion and neck supple. Cardiovascular:      Rate and Rhythm: Regular rhythm. Tachycardia present. Pulmonary:      Effort: Pulmonary effort is normal.      Breath sounds: Normal breath sounds.    Abdominal:      General: Bowel sounds are normal.      Palpations: Abdomen is soft. Tenderness: There is no abdominal tenderness. Musculoskeletal: Normal range of motion. Right lower leg: Edema present. Left lower leg: Edema present. Comments: Left great toenail avulsion healing well with no swelling or redness of toe. Skin:     General: Skin is warm and dry. Findings: Erythema (BLE worse on left) present. Neurological:      Mental Status: She is alert and oriented to person, place, and time. MDM  Number of Diagnoses or Management Options  Cellulitis of left lower extremity  Diagnosis management comments: Pt tachycardic on previous visits to the ED. Leg swelling similar to this with negative US for DVT in past. No acute on blood work. Will treat with abx for possible cellulitis and discharge. Amount and/or Complexity of Data Reviewed  Clinical lab tests: ordered and reviewed    Patient Progress  Patient progress: stable         Procedures        Results Include:    Recent Results (from the past 24 hour(s))   LACTIC ACID    Collection Time: 04/17/21  9:29 PM   Result Value Ref Range    Lactic acid 2.0 0.4 - 2.0 MMOL/L   CBC WITH AUTOMATED DIFF    Collection Time: 04/17/21  9:29 PM   Result Value Ref Range    WBC 7.0 4.3 - 11.1 K/uL    RBC 4.27 4.05 - 5.2 M/uL    HGB 12.7 11.7 - 15.4 g/dL    HCT 40.3 35.8 - 46.3 %    MCV 94.4 79.6 - 97.8 FL    MCH 29.7 26.1 - 32.9 PG    MCHC 31.5 31.4 - 35.0 g/dL    RDW 15.3 (H) 11.9 - 14.6 %    PLATELET 244 069 - 673 K/uL    MPV 8.7 (L) 9.4 - 12.3 FL    ABSOLUTE NRBC 0.00 0.0 - 0.2 K/uL    DF AUTOMATED      NEUTROPHILS 74 43 - 78 %    LYMPHOCYTES 15 13 - 44 %    MONOCYTES 9 4.0 - 12.0 %    EOSINOPHILS 1 0.5 - 7.8 %    BASOPHILS 1 0.0 - 2.0 %    IMMATURE GRANULOCYTES 1 0.0 - 5.0 %    ABS. NEUTROPHILS 5.1 1.7 - 8.2 K/UL    ABS. LYMPHOCYTES 1.0 0.5 - 4.6 K/UL    ABS. MONOCYTES 0.6 0.1 - 1.3 K/UL    ABS. EOSINOPHILS 0.1 0.0 - 0.8 K/UL    ABS.  BASOPHILS 0.0 0.0 - 0.2 K/UL ABS. IMM. GRANS. 0.1 0.0 - 0.5 K/UL   METABOLIC PANEL, COMPREHENSIVE    Collection Time: 04/17/21  9:29 PM   Result Value Ref Range    Sodium 137 136 - 145 mmol/L    Potassium 4.2 3.5 - 5.1 mmol/L    Chloride 101 98 - 107 mmol/L    CO2 30 21 - 32 mmol/L    Anion gap 6 (L) 7 - 16 mmol/L    Glucose 106 (H) 65 - 100 mg/dL    BUN 13 6 - 23 MG/DL    Creatinine 0.91 0.6 - 1.0 MG/DL    GFR est AA >60 >60 ml/min/1.73m2    GFR est non-AA >60 >60 ml/min/1.73m2    Calcium 9.5 8.3 - 10.4 MG/DL    Bilirubin, total 0.2 0.2 - 1.1 MG/DL    ALT (SGPT) 24 12 - 65 U/L    AST (SGOT) 24 15 - 37 U/L    Alk.  phosphatase 87 50 - 136 U/L    Protein, total 7.8 6.3 - 8.2 g/dL    Albumin 3.3 (L) 3.5 - 5.0 g/dL    Globulin 4.5 (H) 2.3 - 3.5 g/dL    A-G Ratio 0.7 (L) 1.2 - 3.5     NT-PRO BNP    Collection Time: 04/17/21  9:29 PM   Result Value Ref Range    NT pro-BNP 99 5 - 125 PG/ML

## 2021-04-18 NOTE — ED NOTES
I have reviewed discharge instructions with the caregiver. The caregiver verbalized understanding. Patient left ED via Discharge Method: stretcher to Home with ( self, transport). Opportunity for questions and clarification provided. Patient given 1 scripts. To continue your aftercare when you leave the hospital, you may receive an automated call from our care team to check in on how you are doing. This is a free service and part of our promise to provide the best care and service to meet your aftercare needs.  If you have questions, or wish to unsubscribe from this service please call [phone]. Thank you for Choosing our [facility] Emergency Department.

## 2021-04-22 LAB
BACTERIA SPEC CULT: NORMAL
SERVICE CMNT-IMP: NORMAL

## 2021-05-24 ENCOUNTER — HOSPITAL ENCOUNTER (EMERGENCY)
Age: 51
Discharge: HOME | End: 2021-05-25
Attending: EMERGENCY MEDICINE
Payer: MEDICARE

## 2021-05-24 ENCOUNTER — APPOINTMENT (OUTPATIENT)
Dept: ULTRASOUND IMAGING | Age: 51
End: 2021-05-24
Attending: EMERGENCY MEDICINE
Payer: MEDICARE

## 2021-05-24 VITALS
TEMPERATURE: 97.7 F | HEART RATE: 105 BPM | WEIGHT: 293 LBS | HEIGHT: 66 IN | OXYGEN SATURATION: 99 % | SYSTOLIC BLOOD PRESSURE: 156 MMHG | BODY MASS INDEX: 47.09 KG/M2 | RESPIRATION RATE: 18 BRPM | DIASTOLIC BLOOD PRESSURE: 72 MMHG

## 2021-05-24 DIAGNOSIS — J02.9 SORE THROAT: ICD-10-CM

## 2021-05-24 DIAGNOSIS — L03.119 CELLULITIS OF LOWER EXTREMITY, UNSPECIFIED LATERALITY: Primary | ICD-10-CM

## 2021-05-24 DIAGNOSIS — I10 ESSENTIAL HYPERTENSION: ICD-10-CM

## 2021-05-24 LAB
ALBUMIN SERPL-MCNC: 3.3 G/DL (ref 3.5–5)
ALBUMIN/GLOB SERPL: 0.8 {RATIO} (ref 1.2–3.5)
ALP SERPL-CCNC: 90 U/L (ref 50–136)
ALT SERPL-CCNC: 23 U/L (ref 12–65)
ANION GAP SERPL CALC-SCNC: 8 MMOL/L (ref 7–16)
AST SERPL-CCNC: 14 U/L (ref 15–37)
ATRIAL RATE: 112 BPM
BASOPHILS # BLD: 0 K/UL (ref 0–0.2)
BASOPHILS NFR BLD: 0 % (ref 0–2)
BILIRUB SERPL-MCNC: 0.3 MG/DL (ref 0.2–1.1)
BNP SERPL-MCNC: 135 PG/ML (ref 5–125)
BUN SERPL-MCNC: 12 MG/DL (ref 6–23)
CALCIUM SERPL-MCNC: 9.5 MG/DL (ref 8.3–10.4)
CALCULATED P AXIS, ECG09: 58 DEGREES
CALCULATED R AXIS, ECG10: 73 DEGREES
CALCULATED T AXIS, ECG11: 41 DEGREES
CHLORIDE SERPL-SCNC: 103 MMOL/L (ref 98–107)
CO2 SERPL-SCNC: 27 MMOL/L (ref 21–32)
CREAT SERPL-MCNC: 0.81 MG/DL (ref 0.6–1)
DIAGNOSIS, 93000: NORMAL
DIFFERENTIAL METHOD BLD: ABNORMAL
EOSINOPHIL # BLD: 0 K/UL (ref 0–0.8)
EOSINOPHIL NFR BLD: 1 % (ref 0.5–7.8)
ERYTHROCYTE [DISTWIDTH] IN BLOOD BY AUTOMATED COUNT: 14.6 % (ref 11.9–14.6)
GLOBULIN SER CALC-MCNC: 4.1 G/DL (ref 2.3–3.5)
GLUCOSE SERPL-MCNC: 107 MG/DL (ref 65–100)
HCT VFR BLD AUTO: 35.4 % (ref 35.8–46.3)
HGB BLD-MCNC: 11.2 G/DL (ref 11.7–15.4)
IMM GRANULOCYTES # BLD AUTO: 0.1 K/UL (ref 0–0.5)
IMM GRANULOCYTES NFR BLD AUTO: 1 % (ref 0–5)
LYMPHOCYTES # BLD: 1.4 K/UL (ref 0.5–4.6)
LYMPHOCYTES NFR BLD: 22 % (ref 13–44)
MCH RBC QN AUTO: 29.7 PG (ref 26.1–32.9)
MCHC RBC AUTO-ENTMCNC: 31.6 G/DL (ref 31.4–35)
MCV RBC AUTO: 93.9 FL (ref 79.6–97.8)
MONOCYTES # BLD: 0.6 K/UL (ref 0.1–1.3)
MONOCYTES NFR BLD: 10 % (ref 4–12)
NEUTS SEG # BLD: 4.3 K/UL (ref 1.7–8.2)
NEUTS SEG NFR BLD: 66 % (ref 43–78)
NRBC # BLD: 0 K/UL (ref 0–0.2)
P-R INTERVAL, ECG05: 152 MS
PLATELET # BLD AUTO: 252 K/UL (ref 150–450)
PMV BLD AUTO: 9.8 FL (ref 9.4–12.3)
POTASSIUM SERPL-SCNC: 4 MMOL/L (ref 3.5–5.1)
PROT SERPL-MCNC: 7.4 G/DL (ref 6.3–8.2)
Q-T INTERVAL, ECG07: 346 MS
QRS DURATION, ECG06: 90 MS
QTC CALCULATION (BEZET), ECG08: 472 MS
RBC # BLD AUTO: 3.77 M/UL (ref 4.05–5.2)
SODIUM SERPL-SCNC: 138 MMOL/L (ref 136–145)
VENTRICULAR RATE, ECG03: 112 BPM
WBC # BLD AUTO: 6.4 K/UL (ref 4.3–11.1)

## 2021-05-24 PROCEDURE — 74011250637 HC RX REV CODE- 250/637: Performed by: EMERGENCY MEDICINE

## 2021-05-24 PROCEDURE — 83880 ASSAY OF NATRIURETIC PEPTIDE: CPT

## 2021-05-24 PROCEDURE — 93970 EXTREMITY STUDY: CPT

## 2021-05-24 PROCEDURE — 93005 ELECTROCARDIOGRAM TRACING: CPT | Performed by: EMERGENCY MEDICINE

## 2021-05-24 PROCEDURE — 80053 COMPREHEN METABOLIC PANEL: CPT

## 2021-05-24 PROCEDURE — 85025 COMPLETE CBC W/AUTO DIFF WBC: CPT

## 2021-05-24 PROCEDURE — 99285 EMERGENCY DEPT VISIT HI MDM: CPT

## 2021-05-24 PROCEDURE — 74011000250 HC RX REV CODE- 250: Performed by: EMERGENCY MEDICINE

## 2021-05-24 RX ORDER — LISINOPRIL 20 MG/1
20 TABLET ORAL DAILY
Qty: 31 TABLET | Refills: 3 | Status: SHIPPED | OUTPATIENT
Start: 2021-05-24

## 2021-05-24 RX ORDER — LISINOPRIL 20 MG/1
20 TABLET ORAL ONCE
Status: COMPLETED | OUTPATIENT
Start: 2021-05-24 | End: 2021-05-24

## 2021-05-24 RX ORDER — CLINDAMYCIN HYDROCHLORIDE 300 MG/1
300 CAPSULE ORAL 4 TIMES DAILY
Qty: 28 CAPSULE | Refills: 0 | Status: SHIPPED | OUTPATIENT
Start: 2021-05-24 | End: 2021-05-31

## 2021-05-24 RX ORDER — LIDOCAINE HYDROCHLORIDE 20 MG/ML
15 SOLUTION OROPHARYNGEAL
Status: COMPLETED | OUTPATIENT
Start: 2021-05-24 | End: 2021-05-24

## 2021-05-24 RX ORDER — CLINDAMYCIN HYDROCHLORIDE 150 MG/1
300 CAPSULE ORAL
Status: COMPLETED | OUTPATIENT
Start: 2021-05-24 | End: 2021-05-24

## 2021-05-24 RX ADMIN — LIDOCAINE HYDROCHLORIDE 15 ML: 20 SOLUTION ORAL; TOPICAL at 20:07

## 2021-05-24 RX ADMIN — CLINDAMYCIN HYDROCHLORIDE 300 MG: 150 CAPSULE ORAL at 20:06

## 2021-05-24 RX ADMIN — LISINOPRIL 20 MG: 20 TABLET ORAL at 20:50

## 2021-05-24 NOTE — ED TRIAGE NOTES
Pt aunt states the home health nurse came out and said pt with increased bilateral leg swelling. Pt denies any pain in legs.

## 2021-05-24 NOTE — ED NOTES
Took bedside report from [facility], [address] [place] [address]. Family at bedside.

## 2021-05-25 NOTE — ED PROVIDER NOTES
Chief complaint : Leg swelling    HISTORY OF PRESENT ILLNESS :  Location : Lower lower extremities    Quality : More swollen than usual    Quantity : Both    Timing : A few days    Severity : Mild    Context : Apparently was treated for cellulitis about a month ago and not ultrasounded  No history of DVTs thus far    Alleviating / exacerbating factors : Home health doubled up on her compression sleeves which go around her calves    Associated Symptoms : Redness and warmth to the legs  Blood pressure was 200/104 home health  150s to 170s here    Aunt, who is accompanying her, does not know her medicines, but states she is not on anything for high blood pressure. Patient is somewhat limited as a historian, her chief complaint is sore throat. Past Medical History:   Diagnosis Date    Psychiatric disorder        Past Surgical History:   Procedure Laterality Date    HX GI      umbilical hernia repair         No family history on file. Social History     Socioeconomic History    Marital status: SINGLE     Spouse name: Not on file    Number of children: Not on file    Years of education: Not on file    Highest education level: Not on file   Occupational History    Not on file   Tobacco Use    Smoking status: Never Smoker    Smokeless tobacco: Never Used   Substance and Sexual Activity    Alcohol use: No    Drug use: Not on file    Sexual activity: Not on file   Other Topics Concern    Not on file   Social History Narrative    Not on file     Social Determinants of Health     Financial Resource Strain:     Difficulty of Paying Living Expenses:    Food Insecurity:     Worried About Running Out of Food in the Last Year:     [address] in the Last Year:    Transportation Needs:     Lack of Transportation (Medical):      Lack of Transportation (Non-Medical):    Physical Activity:     Days of Exercise per Week:     Minutes of Exercise per Session:    Stress:     Feeling of Stress : Social Connections:     Frequency of Communication with Friends and Family:     Frequency of Social Gatherings with Friends and Family:     Attends [employer] Services:     Active Member of Clubs or Organizations:     Attends Club or Organization Meetings:     Marital Status:    Intimate Partner Violence:     Fear of Current or Ex-Partner:     Emotionally Abused:     Physically Abused:     Sexually Abused: ALLERGIES: Pcn [penicillins]    Review of Systems   Unable to perform ROS: Other (?  Developmental delays?)   Constitutional: Negative for chills and fever. HENT: Positive for sore throat. Negative for congestion and trouble swallowing. Eyes: Negative for discharge and redness. Respiratory: Negative for shortness of breath. Cardiovascular: Positive for leg swelling. Negative for chest pain. Gastrointestinal: Negative for abdominal pain, diarrhea and vomiting. Skin: Positive for color change and rash. All other systems reviewed and are negative. Vitals:    05/24/21 1820 05/24/21 1839 05/24/21 1850 05/24/21 1930   BP: (!) 142/65   (!) 170/87   Pulse: (!) 115 (!) 109 (!) 107 (!) 111   Resp: 20 21 21 17   Temp:       SpO2: 99% 99% 98% 96%   Weight:       Height:                Physical Exam  Vitals and nursing note reviewed. Constitutional:       General: She is not in acute distress. Appearance: Normal appearance. She is well-developed. She is not ill-appearing, toxic-appearing or diaphoretic. HENT:      Head: Normocephalic and atraumatic. Right Ear: External ear normal.      Left Ear: External ear normal.      Mouth/Throat:      Mouth: Mucous membranes are moist.      Pharynx: Oropharynx is clear. No oropharyngeal exudate or posterior oropharyngeal erythema. Eyes:      General: No scleral icterus. Right eye: No discharge. Left eye: No discharge. Extraocular Movements: Extraocular movements intact.       Conjunctiva/sclera: Conjunctivae normal.      Pupils: Pupils are equal, round, and reactive to light. Neck:      Thyroid: No thyromegaly. Trachea: Trachea normal.   Cardiovascular:      Rate and Rhythm: Normal rate and regular rhythm. Heart sounds: Normal heart sounds. No murmur heard. No gallop. Pulmonary:      Effort: Pulmonary effort is normal. No respiratory distress. Breath sounds: Normal breath sounds. No wheezing or rales. Abdominal:      General: Bowel sounds are normal.      Palpations: Abdomen is soft. There is no hepatomegaly, splenomegaly or pulsatile mass. Tenderness: There is no abdominal tenderness. There is no guarding. Musculoskeletal:         General: Swelling present. Normal range of motion. Cervical back: Normal range of motion and neck supple. Normal range of motion. Right lower leg: Edema present. Left lower leg: Edema present. Comments: Nonpitting, nontender swelling to both calves, both calves demonstrate redness and warmth suggestive of cellulitis   Lymphadenopathy:      Cervical: No cervical adenopathy. Skin:     General: Skin is warm and dry. Findings: Erythema and rash present. Comments: Probable cellulitis with no obvious suggestion of abscess to bilateral lower extremities   Neurological:      General: No focal deficit present. Mental Status: She is alert. Mental status is at baseline. Motor: No abnormal muscle tone. Comments: cni 2-12 grossly   Psychiatric:         Mood and Affect: Mood normal.         Behavior: Behavior normal.          MDM  Number of Diagnoses or Management Options  Diagnosis management comments: Medical decision making note:  Acute on chronic leg swelling favor cellulitis, treated for the same a month ago, will go ahead and check ultrasound this time to rule out DVT as her history giving is limited  Start treatment with clindamycin, BNP and other blood work looks negative.   This concludes the \"medical decision making note\" part of this emergency department visit note.          Amount and/or Complexity of Data Reviewed  Clinical lab tests: reviewed and ordered (Results Include:    Recent Results (from the past 24 hour(s))  -EKG, 12 LEAD, INITIAL  Collection Time: 05/24/21  6:26 PM       Result                      Value             Ref Range           Ventricular Rate            112               BPM                 Atrial Rate                 112               BPM                 P-R Interval                152               ms                  QRS Duration                90                ms                  Q-T Interval                346               ms                  QTC Calculation (Bezet)     472               ms                  Calculated P Axis           58                degrees             Calculated R Axis           73                degrees             Calculated T Axis           41                degrees             Diagnosis                                                     Sinus tachycardia Otherwise normal ECG When compared with ECG of 17-APR-2021 22:03, Nonspecific T wave abnormality no longer evident in Lateral leads Confirmed by Garrison Hale ([id number]) on 5/24/2021 8:37:33 PM   -CBC WITH AUTOMATED DIFF  Collection Time: 05/24/21  6:37 PM       Result                      Value             Ref Range           WBC                         6.4               4.3 - 11.1 K*       RBC                         3.77 (L)          4.05 - 5.2 M*       HGB                         11.2 (L)          11.7 - 15.4 *       HCT                         35.4 (L)          35.8 - 46.3 %       MCV                         93.9              79.6 - 97.8 *       MCH                         29.7              26.1 - 32.9 *       MCHC                        31.6              31.4 - 35.0 *       RDW                         14.6              11.9 - 14.6 %       PLATELET                    252               150 - 450 K/*       MPV 9.8               9.4 - 12.3 FL       ABSOLUTE NRBC               0.00              0.0 - 0.2 K/*       DF                          AUTOMATED                             NEUTROPHILS                 66                43 - 78 %           LYMPHOCYTES                 22                13 - 44 %           MONOCYTES                   10                4.0 - 12.0 %        EOSINOPHILS                 1                 0.5 - 7.8 %         BASOPHILS                   0                 0.0 - 2.0 %         IMMATURE GRANULOCYTES       1                 0.0 - 5.0 %         ABS. NEUTROPHILS            4.3               1.7 - 8.2 K/*       ABS. LYMPHOCYTES            1.4               0.5 - 4.6 K/*       ABS. MONOCYTES              0.6               0.1 - 1.3 K/*       ABS. EOSINOPHILS            0.0               0.0 - 0.8 K/*       ABS. BASOPHILS              0.0               0.0 - 0.2 K/*       ABS. IMM.  GRANS.            0.1               0.0 - 0.5 K/*  -METABOLIC PANEL, COMPREHENSIVE  Collection Time: 05/24/21  6:37 PM       Result                      Value             Ref Range           Sodium                      138               136 - 145 mm*       Potassium                   4.0               3.5 - 5.1 mm*       Chloride                    103               98 - 107 mmo*       CO2                         27                21 - 32 mmol*       Anion gap                   8                 7 - 16 mmol/L       Glucose                     107 (H)           65 - 100 mg/*       BUN                         12                6 - 23 MG/DL        Creatinine                  0.81              0.6 - 1.0 MG*       GFR est AA                  >60               >60 ml/min/1*       GFR est non-AA              >60               >60 ml/min/1*       Calcium                     9.5               8.3 - 10.4 M*       Bilirubin, total            0.3               0.2 - 1.1 MG*       ALT (SGPT)                  23 12 - 65 U/L         AST (SGOT)                  14 (L)            15 - 37 U/L         Alk.  phosphatase            90                50 - 136 U/L        Protein, total              7.4               6.3 - 8.2 g/*       Albumin                     3.3 (L)           3.5 - 5.0 g/*       Globulin                    4.1 (H)           2.3 - 3.5 g/*       A-G Ratio                   0.8 (L)           1.2 - 3.5      -NT-PRO BNP  Collection Time: 05/24/21  6:37 PM       Result                      Value             Ref Range           NT pro-BNP                  135 (H)           5 - 125 PG/ML  )  Decide to obtain previous medical records or to obtain history from someone other than the patient: yes  Obtain history from someone other than the patient: yes (Aunt)    Risk of Complications, Morbidity, and/or Mortality  Presenting problems: moderate  Diagnostic procedures: low  Management options: moderate    Patient Progress  Patient progress: improved         Procedures

## 2021-05-25 NOTE — ED NOTES
Mar arrived. Report and paperwork given.  Pt taken back to home where she resides with mother, in [place]

## 2021-05-25 NOTE — ED NOTES
I have reviewed discharge instructions with the caregiver. The caregiver verbalized understanding. Patient left ED via Discharge Method: stretcher to Home with family. Opportunity for questions and clarification provided. Patient given 2 scripts. To continue your aftercare when you leave the hospital, you may receive an automated call from our care team to check in on how you are doing. This is a free service and part of our promise to provide the best care and service to meet your aftercare needs.  If you have questions, or wish to unsubscribe from this service please call [phone]. Thank you for Choosing our [address] [place] [address] Emergency Department.

## 2023-01-26 ENCOUNTER — APPOINTMENT (OUTPATIENT)
Dept: GENERAL RADIOLOGY | Age: 53
DRG: 871 | End: 2023-01-26
Payer: MEDICARE

## 2023-01-26 ENCOUNTER — HOSPITAL ENCOUNTER (INPATIENT)
Age: 53
LOS: 11 days | Discharge: HOME HEALTH | DRG: 871 | End: 2023-02-06
Attending: EMERGENCY MEDICINE | Admitting: INTERNAL MEDICINE
Payer: MEDICARE

## 2023-01-26 DIAGNOSIS — J96.01 ACUTE RESPIRATORY FAILURE WITH HYPOXIA (HCC): ICD-10-CM

## 2023-01-26 DIAGNOSIS — J18.9 PNEUMONIA OF BOTH LUNGS DUE TO INFECTIOUS ORGANISM, UNSPECIFIED PART OF LUNG: ICD-10-CM

## 2023-01-26 DIAGNOSIS — R00.0 TACHYCARDIA: ICD-10-CM

## 2023-01-26 DIAGNOSIS — R53.83 OTHER FATIGUE: Primary | ICD-10-CM

## 2023-01-26 DIAGNOSIS — E86.0 DEHYDRATION: ICD-10-CM

## 2023-01-26 DIAGNOSIS — N17.9 ACUTE RENAL FAILURE, UNSPECIFIED ACUTE RENAL FAILURE TYPE (HCC): ICD-10-CM

## 2023-01-26 PROBLEM — E66.01 MORBID OBESITY (HCC): Status: ACTIVE | Noted: 2023-01-26

## 2023-01-26 PROBLEM — G93.41 ACUTE METABOLIC ENCEPHALOPATHY: Status: ACTIVE | Noted: 2023-01-26

## 2023-01-26 PROBLEM — A41.9 SEPSIS (HCC): Status: ACTIVE | Noted: 2023-01-26

## 2023-01-26 PROBLEM — F20.9 SCHIZOPHRENIA (HCC): Status: ACTIVE | Noted: 2023-01-26

## 2023-01-26 PROBLEM — R65.20 SEVERE SEPSIS (HCC): Status: ACTIVE | Noted: 2023-01-26

## 2023-01-26 LAB
ALBUMIN SERPL-MCNC: 2.2 G/DL (ref 3.5–5)
ALBUMIN SERPL-MCNC: 2.3 G/DL (ref 3.5–5)
ALBUMIN/GLOB SERPL: 0.6 (ref 0.4–1.6)
ALBUMIN/GLOB SERPL: 0.6 (ref 0.4–1.6)
ALP SERPL-CCNC: 81 U/L (ref 50–136)
ALP SERPL-CCNC: 82 U/L (ref 50–136)
ALT SERPL-CCNC: 24 U/L (ref 12–65)
ALT SERPL-CCNC: 28 U/L (ref 12–65)
ANION GAP SERPL CALC-SCNC: 10 MMOL/L (ref 2–11)
ANION GAP SERPL CALC-SCNC: 12 MMOL/L (ref 2–11)
ARTERIAL PATENCY WRIST A: POSITIVE
AST SERPL-CCNC: 69 U/L (ref 15–37)
AST SERPL-CCNC: 83 U/L (ref 15–37)
B PERT DNA SPEC QL NAA+PROBE: NOT DETECTED
BASE DEFICIT BLD-SCNC: 1.6 MMOL/L
BASOPHILS # BLD: 0 K/UL (ref 0–0.2)
BASOPHILS NFR BLD: 0 % (ref 0–2)
BDY SITE: ABNORMAL
BILIRUB SERPL-MCNC: 0.2 MG/DL (ref 0.2–1.1)
BILIRUB SERPL-MCNC: 0.2 MG/DL (ref 0.2–1.1)
BORDETELLA PARAPERTUSSIS BY PCR: NOT DETECTED
BUN SERPL-MCNC: 52 MG/DL (ref 6–23)
BUN SERPL-MCNC: 53 MG/DL (ref 6–23)
C PNEUM DNA SPEC QL NAA+PROBE: NOT DETECTED
CA-I BLD-MCNC: 1.13 MMOL/L (ref 1.12–1.32)
CALCIUM SERPL-MCNC: 8 MG/DL (ref 8.3–10.4)
CALCIUM SERPL-MCNC: 8.3 MG/DL (ref 8.3–10.4)
CHLORIDE SERPL-SCNC: 102 MMOL/L (ref 101–110)
CHLORIDE SERPL-SCNC: 105 MMOL/L (ref 101–110)
CO2 BLD-SCNC: 21 MMOL/L (ref 13–23)
CO2 SERPL-SCNC: 20 MMOL/L (ref 21–32)
CO2 SERPL-SCNC: 22 MMOL/L (ref 21–32)
CREAT SERPL-MCNC: 2.4 MG/DL (ref 0.6–1)
CREAT SERPL-MCNC: 2.5 MG/DL (ref 0.6–1)
CRP SERPL-MCNC: 11 MG/DL (ref 0–0.9)
DIFFERENTIAL METHOD BLD: ABNORMAL
EOSINOPHIL # BLD: 0 K/UL (ref 0–0.8)
EOSINOPHIL NFR BLD: 0 % (ref 0.5–7.8)
ERYTHROCYTE [DISTWIDTH] IN BLOOD BY AUTOMATED COUNT: 14.6 % (ref 11.9–14.6)
FIO2 ON VENT: 100 %
FLUAV H1 2009 PAND RNA SPEC QL NAA+PROBE: DETECTED
FLUBV RNA SPEC QL NAA+PROBE: NOT DETECTED
GAS FLOW.O2 O2 DELIVERY SYS: ABNORMAL
GLOBULIN SER CALC-MCNC: 3.7 G/DL (ref 2.8–4.5)
GLOBULIN SER CALC-MCNC: 3.9 G/DL (ref 2.8–4.5)
GLUCOSE BLD STRIP.AUTO-MCNC: 113 MG/DL (ref 65–100)
GLUCOSE SERPL-MCNC: 105 MG/DL (ref 65–100)
GLUCOSE SERPL-MCNC: 111 MG/DL (ref 65–100)
HADV DNA SPEC QL NAA+PROBE: NOT DETECTED
HCO3 BLD-SCNC: 22.3 MMOL/L (ref 22–26)
HCOV 229E RNA SPEC QL NAA+PROBE: NOT DETECTED
HCOV HKU1 RNA SPEC QL NAA+PROBE: NOT DETECTED
HCOV NL63 RNA SPEC QL NAA+PROBE: NOT DETECTED
HCOV OC43 RNA SPEC QL NAA+PROBE: NOT DETECTED
HCT VFR BLD AUTO: 34.3 % (ref 35.8–46.3)
HGB BLD-MCNC: 11.3 G/DL (ref 11.7–15.4)
HMPV RNA SPEC QL NAA+PROBE: NOT DETECTED
HPIV1 RNA SPEC QL NAA+PROBE: NOT DETECTED
HPIV2 RNA SPEC QL NAA+PROBE: NOT DETECTED
HPIV3 RNA SPEC QL NAA+PROBE: NOT DETECTED
HPIV4 RNA SPEC QL NAA+PROBE: NOT DETECTED
IMM GRANULOCYTES # BLD AUTO: 0 K/UL (ref 0–0.5)
IMM GRANULOCYTES NFR BLD AUTO: 1 % (ref 0–5)
INR PPP: 1
LACTATE SERPL-SCNC: 1.1 MMOL/L (ref 0.4–2)
LACTATE SERPL-SCNC: 1.3 MMOL/L (ref 0.4–2)
LIPASE SERPL-CCNC: 91 U/L (ref 73–393)
LYMPHOCYTES # BLD: 0.6 K/UL (ref 0.5–4.6)
LYMPHOCYTES NFR BLD: 18 % (ref 13–44)
M PNEUMO DNA SPEC QL NAA+PROBE: NOT DETECTED
MAGNESIUM SERPL-MCNC: 2.1 MG/DL (ref 1.8–2.4)
MCH RBC QN AUTO: 29.4 PG (ref 26.1–32.9)
MCHC RBC AUTO-ENTMCNC: 32.9 G/DL (ref 31.4–35)
MCV RBC AUTO: 89.1 FL (ref 82–102)
MONOCYTES # BLD: 0.2 K/UL (ref 0.1–1.3)
MONOCYTES NFR BLD: 6 % (ref 4–12)
NEUTS SEG # BLD: 2.8 K/UL (ref 1.7–8.2)
NEUTS SEG NFR BLD: 75 % (ref 43–78)
NRBC # BLD: 0 K/UL (ref 0–0.2)
NT PRO BNP: 43 PG/ML (ref 5–125)
PCO2 BLD: 34.3 MMHG (ref 35–45)
PH BLD: 7.42 (ref 7.35–7.45)
PLATELET # BLD AUTO: 150 K/UL (ref 150–450)
PLATELET COMMENT: ADEQUATE
PMV BLD AUTO: 10.5 FL (ref 9.4–12.3)
PO2 BLD: 94 MMHG (ref 75–100)
POTASSIUM BLD-SCNC: 4.9 MMOL/L (ref 3.5–5.1)
POTASSIUM SERPL-SCNC: 4.1 MMOL/L (ref 3.5–5.1)
POTASSIUM SERPL-SCNC: 4.1 MMOL/L (ref 3.5–5.1)
PROCALCITONIN SERPL-MCNC: 0.12 NG/ML (ref 0–0.49)
PROT SERPL-MCNC: 6 G/DL (ref 6.3–8.2)
PROT SERPL-MCNC: 6.1 G/DL (ref 6.3–8.2)
PROTHROMBIN TIME: 13.3 SEC (ref 12.6–14.3)
RBC # BLD AUTO: 3.85 M/UL (ref 4.05–5.2)
RBC MORPH BLD: ABNORMAL
RSV RNA SPEC QL NAA+PROBE: NOT DETECTED
RV+EV RNA SPEC QL NAA+PROBE: NOT DETECTED
SAO2 % BLD: 98 %
SARS-COV-2 RDRP RESP QL NAA+PROBE: NOT DETECTED
SARS-COV-2 RNA RESP QL NAA+PROBE: NOT DETECTED
SERVICE CMNT-IMP: ABNORMAL
SODIUM BLD-SCNC: 133 MMOL/L (ref 136–145)
SODIUM SERPL-SCNC: 135 MMOL/L (ref 133–143)
SODIUM SERPL-SCNC: 136 MMOL/L (ref 133–143)
SOURCE: NORMAL
SPECIMEN SITE: ABNORMAL
TROPONIN I SERPL HS-MCNC: 14.9 PG/ML (ref 0–14)
TROPONIN I SERPL HS-MCNC: 16.4 PG/ML (ref 0–14)
WBC # BLD AUTO: 3.6 K/UL (ref 4.3–11.1)
WBC MORPH BLD: ABNORMAL

## 2023-01-26 PROCEDURE — 84145 PROCALCITONIN (PCT): CPT

## 2023-01-26 PROCEDURE — 2500000003 HC RX 250 WO HCPCS: Performed by: INTERNAL MEDICINE

## 2023-01-26 PROCEDURE — 86140 C-REACTIVE PROTEIN: CPT

## 2023-01-26 PROCEDURE — 2700000000 HC OXYGEN THERAPY PER DAY

## 2023-01-26 PROCEDURE — 83880 ASSAY OF NATRIURETIC PEPTIDE: CPT

## 2023-01-26 PROCEDURE — 87040 BLOOD CULTURE FOR BACTERIA: CPT

## 2023-01-26 PROCEDURE — 80053 COMPREHEN METABOLIC PANEL: CPT

## 2023-01-26 PROCEDURE — 82803 BLOOD GASES ANY COMBINATION: CPT

## 2023-01-26 PROCEDURE — 36415 COLL VENOUS BLD VENIPUNCTURE: CPT

## 2023-01-26 PROCEDURE — 6370000000 HC RX 637 (ALT 250 FOR IP): Performed by: EMERGENCY MEDICINE

## 2023-01-26 PROCEDURE — 99285 EMERGENCY DEPT VISIT HI MDM: CPT

## 2023-01-26 PROCEDURE — 0202U NFCT DS 22 TRGT SARS-COV-2: CPT

## 2023-01-26 PROCEDURE — 84484 ASSAY OF TROPONIN QUANT: CPT

## 2023-01-26 PROCEDURE — 83690 ASSAY OF LIPASE: CPT

## 2023-01-26 PROCEDURE — 82330 ASSAY OF CALCIUM: CPT

## 2023-01-26 PROCEDURE — 85025 COMPLETE CBC W/AUTO DIFF WBC: CPT

## 2023-01-26 PROCEDURE — 71045 X-RAY EXAM CHEST 1 VIEW: CPT

## 2023-01-26 PROCEDURE — 83735 ASSAY OF MAGNESIUM: CPT

## 2023-01-26 PROCEDURE — 87635 SARS-COV-2 COVID-19 AMP PRB: CPT

## 2023-01-26 PROCEDURE — 2580000003 HC RX 258: Performed by: INTERNAL MEDICINE

## 2023-01-26 PROCEDURE — 83605 ASSAY OF LACTIC ACID: CPT

## 2023-01-26 PROCEDURE — 1100000000 HC RM PRIVATE

## 2023-01-26 PROCEDURE — 85610 PROTHROMBIN TIME: CPT

## 2023-01-26 PROCEDURE — 94640 AIRWAY INHALATION TREATMENT: CPT

## 2023-01-26 PROCEDURE — 6360000002 HC RX W HCPCS: Performed by: INTERNAL MEDICINE

## 2023-01-26 RX ORDER — DOXEPIN HYDROCHLORIDE 100 MG/1
100 CAPSULE ORAL NIGHTLY
COMMUNITY

## 2023-01-26 RX ORDER — OLANZAPINE 20 MG/1
20 TABLET ORAL NIGHTLY
COMMUNITY

## 2023-01-26 RX ORDER — SODIUM CHLORIDE 0.9 % (FLUSH) 0.9 %
5-40 SYRINGE (ML) INJECTION PRN
Status: DISCONTINUED | OUTPATIENT
Start: 2023-01-26 | End: 2023-02-06 | Stop reason: HOSPADM

## 2023-01-26 RX ORDER — CHLORPROMAZINE HYDROCHLORIDE 50 MG/1
50 TABLET, FILM COATED ORAL 3 TIMES DAILY
Status: ON HOLD | COMMUNITY
End: 2023-02-06 | Stop reason: SDUPTHER

## 2023-01-26 RX ORDER — SODIUM CHLORIDE 9 MG/ML
INJECTION, SOLUTION INTRAVENOUS PRN
Status: DISCONTINUED | OUTPATIENT
Start: 2023-01-26 | End: 2023-02-06 | Stop reason: HOSPADM

## 2023-01-26 RX ORDER — ACETAMINOPHEN 650 MG/1
650 SUPPOSITORY RECTAL EVERY 6 HOURS PRN
Status: DISCONTINUED | OUTPATIENT
Start: 2023-01-26 | End: 2023-02-06 | Stop reason: HOSPADM

## 2023-01-26 RX ORDER — ACETAMINOPHEN 325 MG/1
650 TABLET ORAL EVERY 6 HOURS PRN
Status: DISCONTINUED | OUTPATIENT
Start: 2023-01-26 | End: 2023-02-06 | Stop reason: HOSPADM

## 2023-01-26 RX ORDER — VALPROIC ACID 250 MG/1
1000 CAPSULE, LIQUID FILLED ORAL
COMMUNITY

## 2023-01-26 RX ORDER — SODIUM CHLORIDE 9 MG/ML
INJECTION, SOLUTION INTRAVENOUS CONTINUOUS
Status: DISCONTINUED | OUTPATIENT
Start: 2023-01-26 | End: 2023-01-30

## 2023-01-26 RX ORDER — IPRATROPIUM BROMIDE AND ALBUTEROL SULFATE 2.5; .5 MG/3ML; MG/3ML
1 SOLUTION RESPIRATORY (INHALATION) ONCE
Status: COMPLETED | OUTPATIENT
Start: 2023-01-26 | End: 2023-01-26

## 2023-01-26 RX ORDER — ALBUTEROL SULFATE 2.5 MG/3ML
2.5 SOLUTION RESPIRATORY (INHALATION) EVERY 6 HOURS PRN
Status: DISCONTINUED | OUTPATIENT
Start: 2023-01-26 | End: 2023-02-06 | Stop reason: HOSPADM

## 2023-01-26 RX ORDER — DULOXETIN HYDROCHLORIDE 60 MG/1
60 CAPSULE, DELAYED RELEASE ORAL DAILY
COMMUNITY

## 2023-01-26 RX ORDER — ENOXAPARIN SODIUM 100 MG/ML
30 INJECTION SUBCUTANEOUS 2 TIMES DAILY
Status: DISCONTINUED | OUTPATIENT
Start: 2023-01-26 | End: 2023-02-06 | Stop reason: HOSPADM

## 2023-01-26 RX ORDER — ALPRAZOLAM 1 MG/1
1 TABLET ORAL 4 TIMES DAILY PRN
COMMUNITY

## 2023-01-26 RX ORDER — 0.9 % SODIUM CHLORIDE 0.9 %
1000 INTRAVENOUS SOLUTION INTRAVENOUS ONCE
Status: COMPLETED | OUTPATIENT
Start: 2023-01-26 | End: 2023-01-26

## 2023-01-26 RX ORDER — SODIUM CHLORIDE 0.9 % (FLUSH) 0.9 %
5-40 SYRINGE (ML) INJECTION EVERY 12 HOURS SCHEDULED
Status: DISCONTINUED | OUTPATIENT
Start: 2023-01-26 | End: 2023-02-06 | Stop reason: HOSPADM

## 2023-01-26 RX ORDER — ESCITALOPRAM OXALATE 20 MG/1
20 TABLET ORAL DAILY
COMMUNITY

## 2023-01-26 RX ADMIN — Medication 5 UNITS: at 23:13

## 2023-01-26 RX ADMIN — SODIUM CHLORIDE, PRESERVATIVE FREE 10 ML: 5 INJECTION INTRAVENOUS at 20:52

## 2023-01-26 RX ADMIN — IPRATROPIUM BROMIDE AND ALBUTEROL SULFATE 1 AMPULE: .5; 3 SOLUTION RESPIRATORY (INHALATION) at 15:44

## 2023-01-26 RX ADMIN — SODIUM CHLORIDE 1000 ML: 9 INJECTION, SOLUTION INTRAVENOUS at 18:07

## 2023-01-26 RX ADMIN — SODIUM CHLORIDE: 9 INJECTION, SOLUTION INTRAVENOUS at 20:53

## 2023-01-26 RX ADMIN — ENOXAPARIN SODIUM 30 MG: 100 INJECTION SUBCUTANEOUS at 20:52

## 2023-01-26 RX ADMIN — SODIUM CHLORIDE 1000 ML: 9 INJECTION, SOLUTION INTRAVENOUS at 18:08

## 2023-01-26 RX ADMIN — AZITHROMYCIN 500 MG: 500 INJECTION, POWDER, LYOPHILIZED, FOR SOLUTION INTRAVENOUS at 20:52

## 2023-01-26 ASSESSMENT — PAIN - FUNCTIONAL ASSESSMENT: PAIN_FUNCTIONAL_ASSESSMENT: 0-10

## 2023-01-26 ASSESSMENT — ENCOUNTER SYMPTOMS
CHOKING: 1
SHORTNESS OF BREATH: 1
GASTROINTESTINAL NEGATIVE: 1

## 2023-01-26 ASSESSMENT — PAIN SCALES - GENERAL
PAINLEVEL_OUTOF10: 0

## 2023-01-26 NOTE — ED TRIAGE NOTES
Patient arrives to ED via EMS from home. Per family patient has been more fatigued since yesterday. Family reports she fell yesterday due to feeling weak. Patient was 80% on RA initially. Patient placed on NRB. Patient was 80/50.      In route:  Blood cultures x 1  Rocephin 1g  500 cc NS

## 2023-01-26 NOTE — ED PROVIDER NOTES
Emergency Department Provider Note                   PCP:                None None               Age: 45 y.o. Sex: female       ICD-10-CM    1. Other fatigue  R53.83       2. Acute respiratory failure with hypoxia ([facility])  J96.01       3. Pneumonia of both lungs due to infectious organism, unspecified part of lung  J18.9       4. Acute renal failure, unspecified acute renal failure type ([facility] [address]  N17.9       5. Dehydration  E86.0           DISPOSITION Admitted 01/26/2023 02:51:41 PM        Medical Decision Making  78-year-old  female presented the emergency department via EMS with complaints of hypoxia and fever with concerns for pneumonia as a \"code sepsis. \"  Patient had been given Rocephin in route. Patient with labs revealing acute renal failure. Patient also with hypoxemic respiratory failure. Additional antibiotics and fluids were given as well as breathing treatment. Patient will be admitted to the hospitalist for further treatment evaluation. Amount and/or Complexity of Data Reviewed  Independent Historian: EMS     Details: See note  Labs: ordered. Decision-making details documented in ED Course. Radiology: ordered and independent interpretation performed. Decision-making details documented in ED Course. ECG/medicine tests: ordered and independent interpretation performed. Decision-making details documented in ED Course. Risk  Decision regarding hospitalization. Complexity of Problem: 2 or more acute complicated illnesses (4)  The patients assessment required an independent historian: I spoke with a family member. The patients assessment required an independent historian: I spoke with the paramedic. I have conducted an independent ordering and review of Labs. I have conducted an independent ordering and review of EKG. I have conducted an independent ordering and review of X-rays. Patient was admitted I have communication with admitting physician.    ED Course as of 01/26/23 1452   u Jan 26, 2023   1313 ===================================  ED EKG Interpretation  EKG was interpreted in the absence of a cardiologist.    Rate: 101  EKG Interpretation: Sinus tachycardia. Right ventricular hypertrophy. Normal HI and QT intervals. ST Segments: Nonspecific ST segments - NO STEMI    Say Artis MD 1:13 PM    [JL]   1315 ABG obtained revealing hypoxemia. No other significant abnormalities. [JL]   1413 Creatinine(!): 2.40  Patient with acute renal failure/EDU. Baseline creatinine of 0.8. [JL]      ED Course User Index  [JL] Jam Leiva MD        Orders Placed This Encounter   Procedures    Blood Culture 1    Blood Culture 2    COVID-19, Rapid    XR CHEST PORTABLE    CBC with Auto Differential    CMP    Lipase    Magnesium    Lactate, Sepsis    Protime-INR    Troponin    Procalcitonin    Urinalysis w rflx microscopic    C-Reactive Protein    Brain Natriuretic Peptide    Cardiac Monitor - ED Only    Straight cath    Initiate Oxygen Therapy Protocol    RT--ABG WITH ELECTROLYTES    POCT Blood Gas & Electrolytes    EKG 12 Lead    Saline lock IV    ADMIT TO INPATIENT        Medications   azithromycin (ZITHROMAX) 500 mg in sodium chloride 0.9 % 250 mL IVPB ([id number]) (has no administration in time range)   0.9 % sodium chloride bolus (has no administration in time range)   ipratropium-albuterol (DUONEB) nebulizer solution 1 ampule (has no administration in time range)       New Prescriptions    No medications on file        Shreyas Wolf is a 45 y.o. female who presents to the Emergency Department with chief complaint of    Chief Complaint   Patient presents with    Fatigue      58-year-old  female presented to the emergency department with complaints of significant shortness of breath. Per EMS, patient was hypoxic down to 80% at home.   Patient has been increasingly fatigued and weak over the past 2 weeks but her shortness of breath started approximately 2 days ago.  Patient has been coughing with productive cough. She was noted to be febrile and tachycardic for EMS and therefore was deemed a code sepsis. Patient received IV fluids and Rocephin in route by EMS. Patient on nonrebreather to maintain oxygen saturations. Patient apparently is limited verbally at baseline and per EMS, patient is at her baseline mentally. Patient has been so weak over the past 2 weeks that she did fall yesterday while trying to pivot. She did not sustain any injuries from her fall. They deny nausea, vomiting, diarrhea, changes in bowel or bladder habits or other concerns. The history is provided by the EMS personnel and medical records. The history is limited by the condition of the patient. Review of Systems   Constitutional:  Positive for activity change, appetite change, chills, fatigue and fever. HENT: Negative. Respiratory:  Positive for choking and shortness of breath. Cardiovascular:  Positive for leg swelling. Gastrointestinal: Negative. Genitourinary: Negative. Musculoskeletal: Negative. Neurological:  Positive for weakness (Generalized). All other systems reviewed and are negative. Past Medical History:   Diagnosis Date    Psychiatric disorder         Past Surgical History:   Procedure Laterality Date    GI      umbilical hernia repair        No family history on file. Social History     Socioeconomic History    Marital status: Single   Tobacco Use    Smoking status: Never    Smokeless tobacco: Never   Substance and Sexual Activity    Alcohol use: No         Penicillins     Previous Medications    LISINOPRIL (PRINIVIL;ZESTRIL) 20 MG TABLET    Take 20 mg by mouth daily    POTASSIUM CHLORIDE (MICRO-K) 10 MEQ EXTENDED RELEASE CAPSULE    Take 10 mEq by mouth 2 times daily        Vitals signs and nursing note reviewed.    Patient Vitals for the past 4 hrs:   Temp Pulse Resp BP SpO2   01/26/23 1303 98.9 °F (37.2 °C) (!) 101 24 (!) 124/101 95 % Physical Exam  Vitals and nursing note reviewed. Constitutional:       General: She is in acute distress. Appearance: She is ill-appearing. HENT:      Head: Normocephalic and atraumatic. Mouth/Throat:      Mouth: Mucous membranes are dry. Eyes:      Extraocular Movements: Extraocular movements intact. Pupils: Pupils are equal, round, and reactive to light. Cardiovascular:      Rate and Rhythm: Regular rhythm. Tachycardia present. Pulses: Normal pulses. Heart sounds: Normal heart sounds. Pulmonary:      Effort: Respiratory distress present. Breath sounds: Wheezing and rhonchi present. Abdominal:      Palpations: Abdomen is soft. Tenderness: There is no abdominal tenderness. There is no guarding or rebound. Musculoskeletal:      Cervical back: Normal range of motion. Neurological:      Mental Status: Mental status is at baseline. Comments: Patient will follow commands. Limited verbally. Psychiatric:      Comments: Limited verbally          Results for orders placed or performed during the hospital encounter of 01/26/23   Blood Culture 2    Specimen: Blood   Result Value Ref Range    Special Requests RIGHT  HAND        Culture PENDING    COVID-19, Rapid    Specimen: Nasopharyngeal   Result Value Ref Range    Source NASAL      SARS-CoV-2, Rapid Not detected NOTD     XR CHEST PORTABLE    Narrative    PORTABLE CHEST 1 VIEW    HISTORY: Shortness of breath    COMPARISON: 4/17/2021    FINDINGS: Consolidation is present throughout the left lung and in the right  upper lobe. EKG leads are present. Lung volumes are diminished. Impression    Bilateral consolidation.    CBC with Auto Differential   Result Value Ref Range    WBC 3.6 (L) 4.3 - 11.1 K/uL    RBC 3.85 (L) 4.05 - 5.2 M/uL    Hemoglobin 11.3 (L) 11.7 - 15.4 g/dL    Hematocrit 34.3 (L) 35.8 - 46.3 %    MCV 89.1 82 - 102 FL    MCH 29.4 26.1 - 32.9 PG    MCHC 32.9 31.4 - 35.0 g/dL    RDW 14.6 11.9 - 14.6 %    Platelets 306 548 - 044 K/uL    MPV 10.5 9.4 - 12.3 FL    nRBC 0.00 0.0 - 0.2 K/uL    Differential Type PENDING    CMP   Result Value Ref Range    Sodium 136 133 - 143 mmol/L    Potassium 4.1 3.5 - 5.1 mmol/L    Chloride 102 101 - 110 mmol/L    CO2 22 21 - 32 mmol/L    Anion Gap 12 (H) 2 - 11 mmol/L    Glucose 111 (H) 65 - 100 mg/dL    BUN 52 (H) 6 - 23 MG/DL    Creatinine 2.40 (H) 0.6 - 1.0 MG/DL    Est, Glom Filt Rate 23 (L) >60 ml/min/1.73m2    Calcium 8.0 (L) 8.3 - 10.4 MG/DL    Total Bilirubin 0.2 0.2 - 1.1 MG/DL    ALT 24 12 - 65 U/L    AST 69 (H) 15 - 37 U/L    Alk Phosphatase 82 50 - 136 U/L    Total Protein 6.0 (L) 6.3 - 8.2 g/dL    Albumin 2.3 (L) 3.5 - 5.0 g/dL    Globulin 3.7 2.8 - 4.5 g/dL    Albumin/Globulin Ratio 0.6 0.4 - 1.6     Lipase   Result Value Ref Range    Lipase 91 73 - 393 U/L   Magnesium   Result Value Ref Range    Magnesium 2.1 1.8 - 2.4 mg/dL   Lactate, Sepsis   Result Value Ref Range    Lactic Acid, Sepsis 1.1 0.4 - 2.0 MMOL/L   Protime-INR   Result Value Ref Range    Protime 13.3 12.6 - 14.3 sec    INR 1.0     Troponin   Result Value Ref Range    Troponin, High Sensitivity 16.4 (H) 0 - 14 pg/mL   Procalcitonin   Result Value Ref Range    Procalcitonin 0.12 0.00 - 0.49 ng/mL   C-Reactive Protein   Result Value Ref Range    CRP 11.0 (H) 0.0 - 0.9 mg/dL   Brain Natriuretic Peptide   Result Value Ref Range    NT Pro-BNP 43 5 - 125 PG/ML   POCT Blood Gas & Electrolytes   Result Value Ref Range    pH, Arterial, POC 7.42 7.35 - 7.45      pCO2, Arterial, POC 34.3 (L) 35 - 45 MMHG    pO2, Arterial, POC 94 75 - 100 MMHG    POC Sodium 133 (L) 136 - 145 MMOL/L    POC Potassium 4.9 3.5 - 5.1 MMOL/L    POC Ionized Calcium 1.13 1.12 - 1.32 mmol/L    POC Glucose 113 (H) 65 - 100 MG/DL    BASE DEFICIT (POC) 1.6 mmol/L    HCO3, Mixed 22.3 22 - 26 MMOL/L    POC TCO2 21 13 - 23 MMOL/L    POC O2 SAT 98 %    Source ARTERIAL      Site RIGHT RADIAL      POC Hemal's Test Positive      DEVICE Non rebreather      FIO2 100 %    Performed by: Ruth Ann     eGFR, POC Cannot be calculated >60 ml/min/1.73m2        XR CHEST PORTABLE   Final Result   Bilateral consolidation. Voice dictation software was used during the making of this note. This software is not perfect and grammatical and other typographical errors may be present. This note has not been completely proofread for errors.         Arias James MD  01/26/23 7614

## 2023-01-26 NOTE — PLAN OF CARE
Problem: Discharge Planning  Goal: Discharge to home or other facility with appropriate resources  Outcome: Not Progressing  Flowsheets  Taken 1/26/2023 1758  Discharge to home or other facility with appropriate resources:   Identify barriers to discharge with patient and caregiver   Identify discharge learning needs (meds, wound care, etc)   Refer to discharge planning if patient needs post-hospital services based on physician order or complex needs related to functional status, cognitive ability or social support system   Arrange for needed discharge resources and transportation as appropriate   Arrange for interpreters to assist at discharge as needed  Taken 1/26/2023 1753  Discharge to home or other facility with appropriate resources:   Identify barriers to discharge with patient and caregiver   Arrange for needed discharge resources and transportation as appropriate   Identify discharge learning needs (meds, wound care, etc)   Arrange for interpreters to assist at discharge as needed   Refer to discharge planning if patient needs post-hospital services based on physician order or complex needs related to functional status, cognitive ability or social support system     Problem: Pain  Goal: Verbalizes/displays adequate comfort level or baseline comfort level  Outcome: Not Progressing  Flowsheets (Taken 1/26/2023 1657)  Verbalizes/displays adequate comfort level or baseline comfort level:   Encourage patient to monitor pain and request assistance   Assess pain using appropriate pain scale   Administer analgesics based on type and severity of pain and evaluate response   Implement non-pharmacological measures as appropriate and evaluate response   Consider cultural and social influences on pain and pain management   Notify Licensed Independent Practitioner if interventions unsuccessful or patient reports new pain     Problem: Discharge Planning  Goal: Discharge to home or other facility with appropriate resources  Outcome: Not Progressing  Flowsheets  Taken 1/26/2023 1758  Discharge to home or other facility with appropriate resources:   Identify barriers to discharge with patient and caregiver   Identify discharge learning needs (meds, wound care, etc)   Refer to discharge planning if patient needs post-hospital services based on physician order or complex needs related to functional status, cognitive ability or social support system   Arrange for needed discharge resources and transportation as appropriate   Arrange for interpreters to assist at discharge as needed  Taken 1/26/2023 1753  Discharge to home or other facility with appropriate resources:   Identify barriers to discharge with patient and caregiver   Arrange for needed discharge resources and transportation as appropriate   Identify discharge learning needs (meds, wound care, etc)   Arrange for interpreters to assist at discharge as needed   Refer to discharge planning if patient needs post-hospital services based on physician order or complex needs related to functional status, cognitive ability or social support system     Problem: Pain  Goal: Verbalizes/displays adequate comfort level or baseline comfort level  Outcome: Not Progressing  Flowsheets (Taken 1/26/2023 1657)  Verbalizes/displays adequate comfort level or baseline comfort level:   Encourage patient to monitor pain and request assistance   Assess pain using appropriate pain scale   Administer analgesics based on type and severity of pain and evaluate response   Implement non-pharmacological measures as appropriate and evaluate response   Consider cultural and social influences on pain and pain management   Notify Licensed Independent Practitioner if interventions unsuccessful or patient reports new pain     Problem: Skin/Tissue Integrity  Goal: Absence of new skin breakdown  Description: 1. Monitor for areas of redness and/or skin breakdown  2. Assess vascular access sites hourly  3. Every 4-6 hours minimum:  Change oxygen saturation probe site  4. Every 4-6 hours:  If on nasal continuous positive airway pressure, respiratory therapy assess nares and determine need for appliance change or resting period. Outcome: Not Progressing     Problem: Safety - Adult  Goal: Free from fall injury  Outcome: Not Progressing     Problem: Confusion  Goal: Confusion, delirium, dementia, or psychosis is improved or at baseline  Description: INTERVENTIONS:  1. Assess for possible contributors to thought disturbance, including medications, impaired vision or hearing, underlying metabolic abnormalities, dehydration, psychiatric diagnoses, and notify attending LIP  2. [place] high risk fall precautions, as indicated  3. Provide frequent short contacts to provide reality reorientation, refocusing and direction  4. Decrease environmental stimuli, including noise as appropriate  5. Monitor and intervene to maintain adequate nutrition, hydration, elimination, sleep and activity  6. If unable to ensure safety without constant attention obtain sitter and review sitter guidelines with assigned personnel  7.  Initiate Psychosocial CNS and Spiritual Care consult, as indicated  Outcome: Not Progressing  Flowsheets (Taken 1/26/2023 1753)  Effect of thought disturbance (confusion, delirium, dementia, or psychosis) are managed with adequate functional status:   Assess for contributors to thought disturbance, including medications, impaired vision or hearing, underlying metabolic abnormalities, dehydration, psychiatric diagnoses, notify [facility] high risk fall precautions, as indicated   Provide frequent short contacts to provide reality reorientation, refocusing and direction   Decrease environmental stimuli, including noise as appropriate   Monitor and intervene to maintain adequate nutrition, hydration, elimination, sleep and activity   If unable to ensure safety without constant attention obtain sitter and review sitter guidelines with assigned personnel   Initiate Psychosocial Clinical Nurse Specialist and Spiritual Care consult, as indicated

## 2023-01-26 NOTE — H&P
Hospitalist History and Physical   Admit Date:  2023 12:56 PM   Name:  Lila Contreras   Age:  45 y.o. Sex:  female  :  1970   MRN:  [id number]   Room:  04/    Presenting Complaint: Fatigue     Reason(s) for Admission: Sepsis ([facility] [A41.9]     History of Present Illness:   Lila Contreras is a 45 y.o. female with a h/o schizophrenia and morbid obesity who presents today with complaints of weakness. Her mom is at bedside who provides the history. Patient developed a cough about 3 days ago. She had a fall yesterday and today seemed weaker and more confused. Oral intake has been reduced. Subjective fever noted today. Otherwise no chest pain, headaches, syncope, palpitations, N/V/D, abdo pain. Patient's mom, Radha Mcmillan, had a cough and pneumonia recently and was treated with antibiotics, though never told she had COVID or influenza. VS noted tachycardia low 100s, BP improved after IVFs. Labs with mild leukopenia, sCr 2.4 (normal baseline), procal 0.13, normal LA. ABG showed pH 7.42, CO2 34. CXR with bilateral consolidations (L>R). She was given ceftriaxone via EMS and azithromycin was added in the ER. She has been given IVFs and BP improved. Rapid COVID is negative. We are consulted for admission and further management. Assessment & Plan:   # Severe sepsis and acute hypoxemic respiratory failure 2/2 CAP vs viral pneumonia   - Leukopenia + tachycardia + infiltrates on CXR + EDU and encephalopathy (end organ damage). RA O2 sat 80% with EMS. Currently on 15L NRB with sats high 90s, so can wean that now. - Given IVFs and ceftriaxone with EMS, azithromycin given here in addition to another 1L bolus. Will add 1 more liter bolus followed by MIVFs. Rapid COVID neg, will check RVP. Con't with ceftriaxone and azithromycin. Monitor urine output.      # Acute septic/metabolic encephalopathy   - ABG showed normal pH without hypercapnea   - Will hold schizophrenia medications today to avoid further sedation. # EDU   - Likely sepsis and pre-renal. Normal baseline. Recheck tomorrow. Monitor urine output. Further work up if worsens or no improvement. # Schizophrenia   - Managed by Dr. Kristina Kirk at [facility] (dx at age 13)   - Holding meds as above. Resume as soon as possible pending improvement in mentation. # Morbid obesity   - Complicates care. I personally reviewed her home medication list with family and updated this in the chart. Anticipated discharge needs: PPD ordered, therapy evals when appropriate. Lives at home with family. Diet: NPO with sips/ice  VTE ppx: Lovenox BID  Code status: Discussed with mom at bedside, patient is full code. POA is Priti cheek [phone]. Hospital Problems:  Principal Problem:    Severe sepsis ([address]  Active Problems:    EDU (acute kidney injury) ([address]    Schizophrenia ([address]    Acute metabolic encephalopathy    Acute hypoxemic respiratory failure ([address]    Morbid obesity ([address]  Resolved Problems:    * No resolved hospital problems. *       Past History:     Past Medical History:   Diagnosis Date    Psychiatric disorder        Past Surgical History:   Procedure Laterality Date    GI      umbilical hernia repair        Social History     Tobacco Use    Smoking status: Never    Smokeless tobacco: Never   Substance Use Topics    Alcohol use: No      Social History     Substance and Sexual Activity   Drug Use Not on file       No family history on file. There is no immunization history on file for this patient.   Allergies   Allergen Reactions    Penicillins Hives     Prior to Admit Medications:  Current Outpatient Medications   Medication Instructions    ALPRAZolam (XANAX) 1 mg, Oral, 4 TIMES DAILY PRN    chlorproMAZINE (THORAZINE) 50 mg, Oral, 3 TIMES DAILY    doxepin (SINEQUAN) 100 mg, Oral, NIGHTLY    DULoxetine (CYMBALTA) 60 mg, Oral, DAILY    escitalopram (LEXAPRO) 20 mg, Oral, DAILY    OLANZapine (ZYPREXA) 20 mg, Oral, NIGHTLY    valproic acid (DEPAKENE) 1,000 mg, Oral, EVERY BEDTIME         Objective:   Patient Vitals for the past 24 hrs:   Temp Pulse Resp BP SpO2   01/26/23 1516 -- 91 20 103/65 96 %   01/26/23 1303 98.9 °F (37.2 °C) (!) 101 24 (!) 124/101 95 %       Oxygen Therapy  SpO2: 96 %    Estimated body mass index is 50.04 kg/m² as calculated from the following:    Height as of this encounter: 5' 6\" (1.676 m). Weight as of this encounter: 310 lb (140.6 kg). No intake or output data in the 24 hours ending 01/26/23 1532      Physical Exam:  Blood pressure 103/65, pulse 91, temperature 98.9 °F (37.2 °C), temperature source Oral, resp. rate 20, height 5' 6\" (1.676 m), weight (!) 310 lb (140.6 kg), SpO2 96 %. General:    Morbidly obese. Ill appearing. Head:  Normocephalic, atraumatic. Eyes:  Sclerae appear normal.  Pupils equally round. ENT:  Nares appear normal.  Dry oral mucosa. Neck:  No restricted ROM. Trachea midline   CV:   RRR. No m/r/g. No jugular venous distension. Lungs:   Bilateral rales, no wheezes or rhonchi. 15L NRB, bedside sats high 90s. Abdomen:   Soft, nontender, nondistended. Extremities: No cyanosis or clubbing. No edema. Skin:     No rashes and normal coloration. Warm and dry. Neuro:  Unable to assess. Lethargic but opens eyes and groans to voice and painful stimuli. Pupils are equal.  Psych:  Unable to fully assess.     I have personally reviewed labs and tests:  Recent Labs:  Recent Results (from the past 24 hour(s))   Blood Culture 2    Collection Time: 01/26/23  1:00 PM    Specimen: Blood   Result Value Ref Range    Special Requests RIGHT  HAND        Culture PENDING    POCT Blood Gas & Electrolytes    Collection Time: 01/26/23  1:09 PM   Result Value Ref Range    pH, Arterial, POC 7.42 7.35 - 7.45      pCO2, Arterial, POC 34.3 (L) 35 - 45 MMHG    pO2, Arterial, POC 94 75 - 100 MMHG    POC Sodium 133 (L) 136 - 145 MMOL/L    POC Potassium 4.9 3.5 - 5.1 MMOL/L    POC Ionized Calcium 1.13 1.12 - 1.32 mmol/L    POC Glucose 113 (H) 65 - 100 MG/DL    BASE DEFICIT (POC) 1.6 mmol/L    HCO3, Mixed 22.3 22 - 26 MMOL/L    POC TCO2 21 13 - 23 MMOL/L    POC O2 SAT 98 %    Source ARTERIAL      Site RIGHT RADIAL      POC Hemal's Test Positive      DEVICE Non rebreather      FIO2 100 %    Performed by: Ruth Ann     eGFR, POC Cannot be calculated >60 ml/min/1.73m2   CBC with Auto Differential    Collection Time: 01/26/23  1:13 PM   Result Value Ref Range    WBC 3.6 (L) 4.3 - 11.1 K/uL    RBC 3.85 (L) 4.05 - 5.2 M/uL    Hemoglobin 11.3 (L) 11.7 - 15.4 g/dL    Hematocrit 34.3 (L) 35.8 - 46.3 %    MCV 89.1 82 - 102 FL    MCH 29.4 26.1 - 32.9 PG    MCHC 32.9 31.4 - 35.0 g/dL    RDW 14.6 11.9 - 14.6 %    Platelets 688 770 - 086 K/uL    MPV 10.5 9.4 - 12.3 FL    nRBC 0.00 0.0 - 0.2 K/uL    Seg Neutrophils 75 43 - 78 %    Lymphocytes 18 13 - 44 %    Monocytes 6 4.0 - 12.0 %    Eosinophils % 0 (L) 0.5 - 7.8 %    Basophils 0 0.0 - 2.0 %    Immature Granulocytes 1 0.0 - 5.0 %    Segs Absolute 2.8 1.7 - 8.2 K/UL    Absolute Lymph # 0.6 0.5 - 4.6 K/UL    Absolute Mono # 0.2 0.1 - 1.3 K/UL    Absolute Eos # 0.0 0.0 - 0.8 K/UL    Basophils Absolute 0.0 0.0 - 0.2 K/UL    Absolute Immature Granulocyte 0.0 0.0 - 0.5 K/UL    RBC Comment NORMOCYTIC/NORMOCHROMIC      WBC Comment OCCASIONAL      Platelet Comment ADEQUATE      Differential Type AUTOMATED     CMP    Collection Time: 01/26/23  1:13 PM   Result Value Ref Range    Sodium 136 133 - 143 mmol/L    Potassium 4.1 3.5 - 5.1 mmol/L    Chloride 102 101 - 110 mmol/L    CO2 22 21 - 32 mmol/L    Anion Gap 12 (H) 2 - 11 mmol/L    Glucose 111 (H) 65 - 100 mg/dL    BUN 52 (H) 6 - 23 MG/DL    Creatinine 2.40 (H) 0.6 - 1.0 MG/DL    Est, Glom Filt Rate 23 (L) >60 ml/min/1.73m2    Calcium 8.0 (L) 8.3 - 10.4 MG/DL    Total Bilirubin 0.2 0.2 - 1.1 MG/DL    ALT 24 12 - 65 U/L    AST 69 (H) 15 - 37 U/L    Alk Phosphatase 82 50 - 136 U/L    Total Protein 6.0 (L) 6.3 - 8.2 g/dL    Albumin 2.3 (L) 3.5 - 5.0 g/dL    Globulin 3.7 2.8 - 4.5 g/dL    Albumin/Globulin Ratio 0.6 0.4 - 1.6     COVID-19, Rapid    Collection Time: 01/26/23  1:13 PM    Specimen: Nasopharyngeal   Result Value Ref Range    Source NASAL      SARS-CoV-2, Rapid Not detected NOTD     Lipase    Collection Time: 01/26/23  1:13 PM   Result Value Ref Range    Lipase 91 73 - 393 U/L   Magnesium    Collection Time: 01/26/23  1:13 PM   Result Value Ref Range    Magnesium 2.1 1.8 - 2.4 mg/dL   Lactate, Sepsis    Collection Time: 01/26/23  1:13 PM   Result Value Ref Range    Lactic Acid, Sepsis 1.1 0.4 - 2.0 MMOL/L   Protime-INR    Collection Time: 01/26/23  1:13 PM   Result Value Ref Range    Protime 13.3 12.6 - 14.3 sec    INR 1.0     Troponin    Collection Time: 01/26/23  1:13 PM   Result Value Ref Range    Troponin, High Sensitivity 16.4 (H) 0 - 14 pg/mL   Procalcitonin    Collection Time: 01/26/23  1:13 PM   Result Value Ref Range    Procalcitonin 0.12 0.00 - 0.49 ng/mL   C-Reactive Protein    Collection Time: 01/26/23  1:13 PM   Result Value Ref Range    CRP 11.0 (H) 0.0 - 0.9 mg/dL   Brain Natriuretic Peptide    Collection Time: 01/26/23  1:13 PM   Result Value Ref Range    NT Pro-BNP 43 5 - 125 PG/ML       I have personally reviewed imaging studies:  XR CHEST PORTABLE    Result Date: 1/26/2023  PORTABLE CHEST 1 VIEW HISTORY: Shortness of breath COMPARISON: 4/17/2021 FINDINGS: Consolidation is present throughout the left lung and in the right upper lobe. EKG leads are present. Lung volumes are diminished. Bilateral consolidation. Echocardiogram:  No results found for this or any previous visit.         Orders Placed This Encounter   Medications    azithromycin (ZITHROMAX) 500 mg in sodium chloride 0.9 % 250 mL IVPB ([id number])     Order Specific Question:   Antimicrobial Indications     Answer:   Pneumonia (CAP)    0.9 % sodium chloride bolus    ipratropium-albuterol (DUONEB) nebulizer solution 1 ampule     Order Specific Question: Initiate RT Bronchodilator Protocol     Answer:   Yes - ED Protocol         Signed:  Eliu Nicole MD    Part of this note may have been written by using a voice dictation software. The note has been proof read but may still contain some grammatical/other typographical errors.

## 2023-01-26 NOTE — ED NOTES
TRANSFER - OUT REPORT:    Verbal report given to 63 vi Turner  being transferred to [facility] for routine progression of patient care       Report consisted of patient's Situation, Background, Assessment and   Recommendations(SBAR). Information from the following report(s) ED SBAR was reviewed with the receiving nurse. Leon Assessment: No data recorded  Lines:   Peripheral IV 01/26/23 Left Forearm (Active)        Opportunity for questions and clarification was provided.       Patient transported with:  Transport         Sofi Biggs RN  01/26/23 7177

## 2023-01-27 ENCOUNTER — APPOINTMENT (OUTPATIENT)
Dept: GENERAL RADIOLOGY | Age: 53
DRG: 871 | End: 2023-01-27
Payer: MEDICARE

## 2023-01-27 LAB
ANION GAP SERPL CALC-SCNC: 5 MMOL/L (ref 2–11)
BASOPHILS # BLD: 0 K/UL (ref 0–0.2)
BASOPHILS NFR BLD: 0 % (ref 0–2)
BUN SERPL-MCNC: 23 MG/DL (ref 6–23)
CALCIUM SERPL-MCNC: 8.6 MG/DL (ref 8.3–10.4)
CHLORIDE SERPL-SCNC: 108 MMOL/L (ref 101–110)
CO2 SERPL-SCNC: 26 MMOL/L (ref 21–32)
CREAT SERPL-MCNC: 0.9 MG/DL (ref 0.6–1)
DIFFERENTIAL METHOD BLD: ABNORMAL
EOSINOPHIL # BLD: 0 K/UL (ref 0–0.8)
EOSINOPHIL NFR BLD: 0 % (ref 0.5–7.8)
ERYTHROCYTE [DISTWIDTH] IN BLOOD BY AUTOMATED COUNT: 14.7 % (ref 11.9–14.6)
GLUCOSE SERPL-MCNC: 116 MG/DL (ref 65–100)
HCT VFR BLD AUTO: 37.3 % (ref 35.8–46.3)
HGB BLD-MCNC: 11.9 G/DL (ref 11.7–15.4)
IMM GRANULOCYTES # BLD AUTO: 0 K/UL (ref 0–0.5)
IMM GRANULOCYTES NFR BLD AUTO: 1 % (ref 0–5)
LYMPHOCYTES # BLD: 0.8 K/UL (ref 0.5–4.6)
LYMPHOCYTES NFR BLD: 18 % (ref 13–44)
MCH RBC QN AUTO: 28.7 PG (ref 26.1–32.9)
MCHC RBC AUTO-ENTMCNC: 31.9 G/DL (ref 31.4–35)
MCV RBC AUTO: 90.1 FL (ref 82–102)
MONOCYTES # BLD: 0.1 K/UL (ref 0.1–1.3)
MONOCYTES NFR BLD: 3 % (ref 4–12)
NEUTS SEG # BLD: 3.8 K/UL (ref 1.7–8.2)
NEUTS SEG NFR BLD: 78 % (ref 43–78)
NRBC # BLD: 0 K/UL (ref 0–0.2)
PLATELET # BLD AUTO: 157 K/UL (ref 150–450)
PLATELET COMMENT: ADEQUATE
PMV BLD AUTO: 10.8 FL (ref 9.4–12.3)
POTASSIUM SERPL-SCNC: 4.6 MMOL/L (ref 3.5–5.1)
RBC # BLD AUTO: 4.14 M/UL (ref 4.05–5.2)
RBC MORPH BLD: ABNORMAL
SODIUM SERPL-SCNC: 139 MMOL/L (ref 133–143)
WBC # BLD AUTO: 4.7 K/UL (ref 4.3–11.1)
WBC MORPH BLD: ABNORMAL

## 2023-01-27 PROCEDURE — 1100000000 HC RM PRIVATE

## 2023-01-27 PROCEDURE — 71045 X-RAY EXAM CHEST 1 VIEW: CPT

## 2023-01-27 PROCEDURE — 80048 BASIC METABOLIC PNL TOTAL CA: CPT

## 2023-01-27 PROCEDURE — 6360000002 HC RX W HCPCS: Performed by: INTERNAL MEDICINE

## 2023-01-27 PROCEDURE — 94761 N-INVAS EAR/PLS OXIMETRY MLT: CPT

## 2023-01-27 PROCEDURE — 92610 EVALUATE SWALLOWING FUNCTION: CPT

## 2023-01-27 PROCEDURE — 2580000003 HC RX 258: Performed by: INTERNAL MEDICINE

## 2023-01-27 PROCEDURE — 6360000002 HC RX W HCPCS: Performed by: FAMILY MEDICINE

## 2023-01-27 PROCEDURE — 85025 COMPLETE CBC W/AUTO DIFF WBC: CPT

## 2023-01-27 PROCEDURE — 2580000003 HC RX 258: Performed by: FAMILY MEDICINE

## 2023-01-27 PROCEDURE — 2700000000 HC OXYGEN THERAPY PER DAY

## 2023-01-27 PROCEDURE — 36415 COLL VENOUS BLD VENIPUNCTURE: CPT

## 2023-01-27 PROCEDURE — 94640 AIRWAY INHALATION TREATMENT: CPT

## 2023-01-27 RX ADMIN — ENOXAPARIN SODIUM 30 MG: 100 INJECTION SUBCUTANEOUS at 21:07

## 2023-01-27 RX ADMIN — AZITHROMYCIN MONOHYDRATE 500 MG: 500 INJECTION, POWDER, LYOPHILIZED, FOR SOLUTION INTRAVENOUS at 10:35

## 2023-01-27 RX ADMIN — CEFEPIME 2000 MG: 2 INJECTION, POWDER, FOR SOLUTION INTRAVENOUS at 21:07

## 2023-01-27 RX ADMIN — CEFTRIAXONE 1000 MG: 1 INJECTION, POWDER, FOR SOLUTION INTRAMUSCULAR; INTRAVENOUS at 10:34

## 2023-01-27 RX ADMIN — ALBUTEROL SULFATE 2.5 MG: 2.5 SOLUTION RESPIRATORY (INHALATION) at 00:41

## 2023-01-27 RX ADMIN — SODIUM CHLORIDE, PRESERVATIVE FREE 10 ML: 5 INJECTION INTRAVENOUS at 10:35

## 2023-01-27 RX ADMIN — Medication 2500 MG: at 22:47

## 2023-01-27 RX ADMIN — SODIUM CHLORIDE, PRESERVATIVE FREE 10 ML: 5 INJECTION INTRAVENOUS at 21:08

## 2023-01-27 RX ADMIN — ENOXAPARIN SODIUM 30 MG: 100 INJECTION SUBCUTANEOUS at 10:35

## 2023-01-27 RX ADMIN — SODIUM CHLORIDE: 9 INJECTION, SOLUTION INTRAVENOUS at 09:13

## 2023-01-27 ASSESSMENT — PAIN SCALES - GENERAL: PAINLEVEL_OUTOF10: 0

## 2023-01-27 NOTE — PROGRESS NOTES
LTG: patient will tolerate safest, least restrictive oral diet without s/sx airway compromise  STG: Patient will tolerate full liquid diet and thin liquids without overt s/sx aspiration  STG: Patient will tolerate ongoing po trials in efforts to advance diet  STG: Patient will participate in modified barium swallow study to objectively assess oropharyngeal swallow if indicated    SPEECH LANGUAGE PATHOLOGY: DYSPHAGIA  Initial Assessment    NAME: Fortino Salazar  : 1970  MRN: [id number]    ADMISSION DATE: 2023  PRIMARY DIAGNOSIS: Severe sepsis ([address]  Dehydration [E86.0]  Acute respiratory failure with hypoxia (HCC) [J96.01]  Sepsis ([facility] [A41.9]  Acute renal failure, unspecified acute renal failure type ([facility] [N17.9]  Other fatigue [R53.83]  Pneumonia of both lungs due to infectious organism, unspecified part of lung [J18.9]    ICD-10: Treatment Diagnosis: R13.12 Dysphagia, Oropharyngeal Phase    RECOMMENDATIONS   Diet:  Liquid Consistency Recommendation: Thin, Full    Medications: Crushed in puree as able     Therapeutic Interventions: Therapeutic PO trials with SLP; Diet tolerance monitoring;Patient/Family education     Compensatory Swallowing Strategies: Remain upright for 30-45 minutes after meals;Eat/Feed slowly;Upright as possible for all oral intake;Swallow 2 times per bite/sip   Patient continues to require skilled intervention: Yes  D/C Recommendations: Ongoing speech therapy is recommended during this hospitalization, To be determined     ASSESSMENT    Dysphagia Diagnosis: Mild pharyngeal stage dysphagia;Mild to moderate oral stage dysphagia  No overt s/sx aspiration. Patient does demonstrates 2-3 swallows with puree that may be related to piecemeal swallow versus possibly pharyngeal residue. Will initiate full liquid diet and plan to follow up for ongoing advancement as respiratory status improves. Recommend 1:1 assistance.      GENERAL    History of Present Injury/Illness: Ms. Ijeoma Kaiser  has a past medical history of Psychiatric disorder. . She also  has a past surgical history that includes gi. Subjective: upright in bed, hungry  Behavior/Cognition: Alert;Confused       Prior Dysphagia History: none per report. Current Diet : NPO  Current Liquid Diet : NPO          O2 Device: Nasal cannula  Liters of Oxygen:  (15L)     Pain:   Patient does not appear in pain                                        OBJECTIVE        Oral Motor   Labial: No impairment  Oral Hygiene: Xerostomia  Lingual: No impairment  Dentition: Adequate        Baseline Vocal Quality: Normal    Oropharyngeal Phase:   Patient presented with ice chips and serial swallows of thin via straw. No overt s/sx aspiration and voice clear. Impulsive with intake. 1:1 assist. provided slow rate and verbal cues to not chug the liquid especially given respiratory status. Patient demonstrates 2-3 swallows with puree. Appears slightly effortful at times, however she denies stasis sensation and no overt s/sx aspiration after ~12oz thin liquid and 4oz puree. Oxygen low 90s, high 80s throughout. PLAN    Duration/Frequency: Continue to follow patient 3x/week for duration of hospitalization and/or until goals met    Dysphagia Outcome and Severity Scale (HENRIQUE)  Dysphagia Outcome Severity Scale: Level 4: Mild moderate dysphagia- Intermittent supervision/cueing. One - two diet consistencies restricted  Interpretation of Tool: The Dysphagia Outcome and Severity Scale (HENRIQUE) is a simple, easy-to-use, 7-point scale developed to systematically rate the functional severity of dysphagia based on objective assessment and make recommendations for diet level, independence level, and type of nutrition.    Normal(7), Functional(6), Mild(5), Mild-Moderate(4), Moderate(3), Moderate-Severe(2), Severe(1)    Speech Therapy Prognosis  Prognosis: Good    Education: Patient, RN, Physician  Patient Education: diet consistencies, aspiration precautions  Patient Education Response: Verbalizes understanding, Needs reinforcement    PRECAUTIONS/ALLERGIES: Penicillins   Safety Devices in place: Yes  Type of devices: Call light within reach; Left in bed;Nurse notified        Therapy Time  SLP Individual Minutes  Time In: 0765  Time Out: 5563  Minutes: Shabbir [address] [place] [address]  1/27/2023 1:40 PM

## 2023-01-27 NOTE — PROGRESS NOTES
Reviewed notes for new spiritual concerns      Will continue to assess how we can best serve this family        [place].       Per notes:       Carolyn

## 2023-01-27 NOTE — CARE COORDINATION
01/27/23 0943   Service Assessment   Patient Orientation Unable to Assess   Cognition Other (see comment)   History Provided By Child/Family   Primary [facility] Parent   PCP Verified by CM Yes  ([address])   Prior Functional Level Independent in ADLs/IADLs   Current Functional Level Independent in ADLs/IADLs   Can patient return to prior living arrangement Yes   Ability to make needs known: Fair   Family able to assist with home care needs: Yes   Would you like for me to discuss the discharge plan with any other family members/significant others, and if so, who? Yes   Financial Resources Medicare   Social/Functional History   Lives With Parent   Type of [address] One level   Home Equipment roger Ghosh   Receives Help From Family   ADL Assistance Needs assistance   Homemaking Responsibilities Yes   Ambulation Assistance Needs assistance   Transfer Assistance Independent   Active  No   CM called patient's mother to discuss discharge plans. Patient has been using a walker to ambulate. Patient is current with [facility]. Patient's aunt drives patient. CM following for discharge needs.

## 2023-01-27 NOTE — PROGRESS NOTES
Hospitalist Progress Note   Admit Date:  2023 12:56 PM   Name:  Gail Bustamante   Age:  45 y.o. Sex:  female  :  1970   MRN:  [id number]   Room:  [facility]/    Presenting Complaint: Fatigue     Reason(s) for Admission: Dehydration [E86.0]  Acute respiratory failure with hypoxia ([address] [J96.01]  Sepsis ([address] [A41.9]  Acute renal failure, unspecified acute renal failure type ([address] [N17.9]  Other fatigue [R53.83]  Pneumonia of both lungs due to infectious organism, unspecified part of lung [J18.9]     History of Present Illness:   Gail Bustamante is a 45 y.o. female with a h/o schizophrenia and morbid obesity who presents today with complaints of weakness. Her mom is at bedside who provides the history. Patient developed a cough about 3 days ago. She had a fall yesterday and today seemed weaker and more confused. Oral intake has been reduced. Subjective fever noted today. Otherwise no chest pain, headaches, syncope, palpitations, N/V/D, abdo pain. Patient's mom, Paulina Conner, had a cough and pneumonia recently and was treated with antibiotics, though never told she had COVID or influenza. VS noted tachycardia low 100s, BP improved after IVFs. Labs with mild leukopenia, sCr 2.4 (normal baseline), procal 0.13, normal LA. ABG showed pH 7.42, CO2 34. CXR with bilateral consolidations (L>R). She was given ceftriaxone via EMS and azithromycin was added in the ER. She has been given IVFs and BP improved. Rapid COVID is negative. We are consulted for admission and further management. 2023  Patient seen and evaluated. New patient for me today. Currently on 15 L via nasal cannula  Does not want to be examined  Denies cp,n/v or any other pain    Assessment & Plan:   # Severe sepsis and acute hypoxemic respiratory failure 2/2 CAP vs viral pneumonia   - Leukopenia + tachycardia + infiltrates on CXR + EDU and encephalopathy (end organ damage). RA O2 sat 80% with EMS.  Currently on 15L NRB with sats high 90s, so can wean that now. - Given IVFs and ceftriaxone with EMS, azithromycin given here in addition to another 1L bolus. Will add 1 more liter bolus followed by MIVFs. Rapid COVID neg, will check RVP. Con't with ceftriaxone and azithromycin. Monitor urine output. 1/27/2023  Continue antibiotics  Follow-up cultures  Patient wants to eat however high risk with respiratory failure  We will get a speech eval prior to feeding  Given her worsening hypoxic resp failure- will have rt put her on airvo  Rpt cxr   Send bmp to check renal function prior to any lasix    # Acute septic/metabolic encephalopathy   - ABG showed normal pH without hypocapnea   - Will hold schizophrenia medications today to avoid further sedation. 1/27/2023  Continue to hold schizophrenic medications for now    # EDU   - Likely sepsis and pre-renal. Normal baseline. Recheck tomorrow. Monitor urine output. Further work up if worsens or no improvement. 1/27/2023  No labs done for today  We will order    # Schizophrenia   - Managed by Dr. Jean Claude Schuler at [facility] (dx at age 13)   - Holding meds as above. Resume as soon as possible pending improvement in mentation. 1/27/2023  Will resume meds when able as above    # Morbid obesity   - Complicates care. Anticipated discharge needs: TBD. She lives at home with family     diet: Continue NPO with sips/ice until seen by speech  VTE ppx: Lovenox BID  Code status: Discussed with mom at bedside, patient is full code. POA is mom, Penny Willams [phone]. Hospital Problems:  Principal Problem:    Severe sepsis ([address]  Active Problems:    EDU (acute kidney injury) ([address]    Schizophrenia ([address]    Acute metabolic encephalopathy    Acute hypoxemic respiratory failure ([address]    Morbid obesity ([facility]  Resolved Problems:    * No resolved hospital problems.  *     Objective:   Patient Vitals for the past 24 hrs:   Temp Pulse Resp BP SpO2   01/27/23 0422 97.5 °F (36.4 °C) 98 24 124/62 91 %   01/27/23 0042 -- 100 18 -- 94 %   01/26/23 2333 98.2 °F (36.8 °C) (!) 104 20 (!) 108/55 91 %   01/26/23 1953 98 °F (36.7 °C) 93 20 (!) 115/59 92 %   01/26/23 1657 99.1 °F (37.3 °C) (!) 106 20 (!) 87/54 93 %   01/26/23 1516 -- 91 20 103/65 96 %   01/26/23 1303 98.9 °F (37.2 °C) (!) 101 24 (!) 124/101 95 %         Oxygen Therapy  SpO2: 91 %  Pulse Oximeter Device Mode: Continuous  O2 Device: High flow nasal cannula  O2 Flow Rate (L/min): 10 L/min    Estimated body mass index is 50.04 kg/m² as calculated from the following:    Height as of this encounter: 5' 6\" (1.676 m). Weight as of this encounter: 310 lb (140.6 kg). No intake or output data in the 24 hours ending 01/27/23 0750      Physical Exam:  Blood pressure 124/62, pulse 98, temperature 97.5 °F (36.4 °C), temperature source Axillary, resp. rate 24, height 5' 6\" (1.676 m), weight (!) 310 lb (140.6 kg), SpO2 91 %. General:    Morbidly obese. Ill appearing. Head:  Normocephalic, atraumatic. Eyes:  Sclerae appear normal.  Pupils equally round. ENT:  Nares appear normal.  Dry oral mucosa. Neck:  No restricted ROM. Trachea midline   CV:   RRR. No m/r/g. No jugular venous distension. Lungs:   Bilateral rales, no wheezes or rhonchi. 15L NRB, bedside sats high 90s. Abdomen:   Soft, nontender, nondistended. Extremities: No cyanosis or clubbing. No edema. Skin:     No rashes and normal coloration. Warm and dry. Neuro:  Unable to assess. Lethargic but opens eyes and groans to voice and painful stimuli. Pupils are equal.  Psych:  Unable to fully assess.     I have personally reviewed labs and tests:  Recent Labs:  Recent Results (from the past 24 hour(s))   Blood Culture 2    Collection Time: 01/26/23  1:00 PM    Specimen: Blood   Result Value Ref Range    Special Requests RIGHT  HAND        Culture NO GROWTH AFTER 17 HOURS     POCT Blood Gas & Electrolytes    Collection Time: 01/26/23  1:09 PM   Result Value Ref Range    pH, Arterial, POC 7.42 7.35 - 7.45      pCO2, Arterial, POC 34.3 (L) 35 - 45 MMHG    pO2, Arterial, POC 94 75 - 100 MMHG    POC Sodium 133 (L) 136 - 145 MMOL/L    POC Potassium 4.9 3.5 - 5.1 MMOL/L    POC Ionized Calcium 1.13 1.12 - 1.32 mmol/L    POC Glucose 113 (H) 65 - 100 MG/DL    BASE DEFICIT (POC) 1.6 mmol/L    HCO3, Mixed 22.3 22 - 26 MMOL/L    POC TCO2 21 13 - 23 MMOL/L    POC O2 SAT 98 %    Source ARTERIAL      Site RIGHT RADIAL      POC Hemal's Test Positive      DEVICE Non rebreather      FIO2 100 %    Performed by: Ruth Ann     eGFR, POC Cannot be calculated >60 ml/min/1.73m2   CBC with Auto Differential    Collection Time: 01/26/23  1:13 PM   Result Value Ref Range    WBC 3.6 (L) 4.3 - 11.1 K/uL    RBC 3.85 (L) 4.05 - 5.2 M/uL    Hemoglobin 11.3 (L) 11.7 - 15.4 g/dL    Hematocrit 34.3 (L) 35.8 - 46.3 %    MCV 89.1 82 - 102 FL    MCH 29.4 26.1 - 32.9 PG    MCHC 32.9 31.4 - 35.0 g/dL    RDW 14.6 11.9 - 14.6 %    Platelets 179 489 - 957 K/uL    MPV 10.5 9.4 - 12.3 FL    nRBC 0.00 0.0 - 0.2 K/uL    Seg Neutrophils 75 43 - 78 %    Lymphocytes 18 13 - 44 %    Monocytes 6 4.0 - 12.0 %    Eosinophils % 0 (L) 0.5 - 7.8 %    Basophils 0 0.0 - 2.0 %    Immature Granulocytes 1 0.0 - 5.0 %    Segs Absolute 2.8 1.7 - 8.2 K/UL    Absolute Lymph # 0.6 0.5 - 4.6 K/UL    Absolute Mono # 0.2 0.1 - 1.3 K/UL    Absolute Eos # 0.0 0.0 - 0.8 K/UL    Basophils Absolute 0.0 0.0 - 0.2 K/UL    Absolute Immature Granulocyte 0.0 0.0 - 0.5 K/UL    RBC Comment NORMOCYTIC/NORMOCHROMIC      WBC Comment OCCASIONAL      Platelet Comment ADEQUATE      Differential Type AUTOMATED     CMP    Collection Time: 01/26/23  1:13 PM   Result Value Ref Range    Sodium 136 133 - 143 mmol/L    Potassium 4.1 3.5 - 5.1 mmol/L    Chloride 102 101 - 110 mmol/L    CO2 22 21 - 32 mmol/L    Anion Gap 12 (H) 2 - 11 mmol/L    Glucose 111 (H) 65 - 100 mg/dL    BUN 52 (H) 6 - 23 MG/DL    Creatinine 2.40 (H) 0.6 - 1.0 MG/DL    Est, Glom Filt Rate 23 (L) >60 ml/min/1.73m2    Calcium 8.0 (L) 8.3 - 10.4 MG/DL    Total Bilirubin 0.2 0.2 - 1.1 MG/DL    ALT 24 12 - 65 U/L    AST 69 (H) 15 - 37 U/L    Alk Phosphatase 82 50 - 136 U/L    Total Protein 6.0 (L) 6.3 - 8.2 g/dL    Albumin 2.3 (L) 3.5 - 5.0 g/dL    Globulin 3.7 2.8 - 4.5 g/dL    Albumin/Globulin Ratio 0.6 0.4 - 1.6     COVID-19, Rapid    Collection Time: 01/26/23  1:13 PM    Specimen: Nasopharyngeal   Result Value Ref Range    Source NASAL      SARS-CoV-2, Rapid Not detected NOTD     Lipase    Collection Time: 01/26/23  1:13 PM   Result Value Ref Range    Lipase 91 73 - 393 U/L   Magnesium    Collection Time: 01/26/23  1:13 PM   Result Value Ref Range    Magnesium 2.1 1.8 - 2.4 mg/dL   Lactate, Sepsis    Collection Time: 01/26/23  1:13 PM   Result Value Ref Range    Lactic Acid, Sepsis 1.1 0.4 - 2.0 MMOL/L   Protime-INR    Collection Time: 01/26/23  1:13 PM   Result Value Ref Range    Protime 13.3 12.6 - 14.3 sec    INR 1.0     Troponin    Collection Time: 01/26/23  1:13 PM   Result Value Ref Range    Troponin, High Sensitivity 16.4 (H) 0 - 14 pg/mL   Procalcitonin    Collection Time: 01/26/23  1:13 PM   Result Value Ref Range    Procalcitonin 0.12 0.00 - 0.49 ng/mL   C-Reactive Protein    Collection Time: 01/26/23  1:13 PM   Result Value Ref Range    CRP 11.0 (H) 0.0 - 0.9 mg/dL   Brain Natriuretic Peptide    Collection Time: 01/26/23  1:13 PM   Result Value Ref Range    NT Pro-BNP 43 5 - 125 PG/ML   Blood Culture 1    Collection Time: 01/26/23  1:16 PM    Specimen: Blood   Result Value Ref Range    Special Requests LEFT  HAND        Culture NO GROWTH AFTER 17 HOURS     Lactate, Sepsis    Collection Time: 01/26/23  6:17 PM   Result Value Ref Range    Lactic Acid, Sepsis 1.3 0.4 - 2.0 MMOL/L   Comprehensive Metabolic Panel w/ Reflex to MG    Collection Time: 01/26/23  6:17 PM   Result Value Ref Range    Sodium 135 133 - 143 mmol/L    Potassium 4.1 3.5 - 5.1 mmol/L    Chloride 105 101 - 110 mmol/L    CO2 20 (L) 21 - 32 mmol/L    Anion Gap 10 2 - 11 mmol/L Glucose 105 (H) 65 - 100 mg/dL    BUN 53 (H) 6 - 23 MG/DL    Creatinine 2.50 (H) 0.6 - 1.0 MG/DL    Est, Glom Filt Rate 22 (L) >60 ml/min/1.73m2    Calcium 8.3 8.3 - 10.4 MG/DL    Total Bilirubin 0.2 0.2 - 1.1 MG/DL    ALT 28 12 - 65 U/L    AST 83 (H) 15 - 37 U/L    Alk Phosphatase 81 50 - 136 U/L    Total Protein 6.1 (L) 6.3 - 8.2 g/dL    Albumin 2.2 (L) 3.5 - 5.0 g/dL    Globulin 3.9 2.8 - 4.5 g/dL    Albumin/Globulin Ratio 0.6 0.4 - 1.6     Troponin    Collection Time: 01/26/23  6:17 PM   Result Value Ref Range    Troponin, High Sensitivity 14.9 (H) 0 - 14 pg/mL   Respiratory Panel, Molecular, with COVID-19 (Restricted: peds pts or suitable admitted adults)    Collection Time: 01/26/23  7:54 PM    Specimen: Nasopharyngeal   Result Value Ref Range    Adenovirus by PCR NOT DETECTED NOTDET      Coronavirus 229E by PCR NOT DETECTED NOTDET      Coronavirus HKU1 by PCR NOT DETECTED NOTDET      Coronavirus NL63 by PCR NOT DETECTED NOTDET      Coronavirus OC43 by PCR NOT DETECTED NOTDET      SARS-CoV-2, PCR NOT DETECTED NOTDET      Human Metapneumovirus by PCR NOT DETECTED NOTDET      Rhinovirus Enterovirus PCR NOT DETECTED NOTDET      Influenza A H1-2009 by PCR Detected (A) NOTDET      Influenza B PCR NOT DETECTED NOTDET      Parainfluenza 1 PCR NOT DETECTED NOTDET      Parainfluenza 2 PCR NOT DETECTED NOTDET      Parainfluenza 3 PCR NOT DETECTED NOTDET      Parainfluenza 4 PCR NOT DETECTED NOTDET      Respiratory Syncytial Virus by PCR NOT DETECTED NOTDET      Bordetella parapertussis by PCR NOT DETECTED NOTDET      Bordetella pertussis by PCR NOT DETECTED NOTDET      Chlamydophila Pneumonia PCR NOT DETECTED NOTDET      Mycoplasma pneumo by PCR NOT DETECTED NOTDET     CBC with Auto Differential    Collection Time: 01/27/23  5:03 AM   Result Value Ref Range    WBC 4.7 4.3 - 11.1 K/uL    RBC 4.14 4.05 - 5.2 M/uL    Hemoglobin 11.9 11.7 - 15.4 g/dL    Hematocrit 37.3 35.8 - 46.3 %    MCV 90.1 82 - 102 FL    MCH 28.7 26.1 - 32.9 PG    MCHC 31.9 31.4 - 35.0 g/dL    RDW 14.7 (H) 11.9 - 14.6 %    Platelets 131 411 - 604 K/uL    MPV 10.8 9.4 - 12.3 FL    nRBC 0.00 0.0 - 0.2 K/uL    Seg Neutrophils 78 43 - 78 %    Lymphocytes 18 13 - 44 %    Monocytes 3 (L) 4.0 - 12.0 %    Eosinophils % 0 (L) 0.5 - 7.8 %    Basophils 0 0.0 - 2.0 %    Immature Granulocytes 1 0.0 - 5.0 %    Segs Absolute 3.8 1.7 - 8.2 K/UL    Absolute Lymph # 0.8 0.5 - 4.6 K/UL    Absolute Mono # 0.1 0.1 - 1.3 K/UL    Absolute Eos # 0.0 0.0 - 0.8 K/UL    Basophils Absolute 0.0 0.0 - 0.2 K/UL    Absolute Immature Granulocyte 0.0 0.0 - 0.5 K/UL    RBC Comment NORMOCYTIC/NORMOCHROMIC      WBC Comment Result Confirmed By Smear      Platelet Comment ADEQUATE      Differential Type AUTOMATED         I have personally reviewed imaging studies:  XR CHEST PORTABLE    Result Date: 1/26/2023  PORTABLE CHEST 1 VIEW HISTORY: Shortness of breath COMPARISON: 4/17/2021 FINDINGS: Consolidation is present throughout the left lung and in the right upper lobe. EKG leads are present. Lung volumes are diminished. Bilateral consolidation. Echocardiogram:  No results found for this or any previous visit.         Orders Placed This Encounter   Medications    azithromycin (ZITHROMAX) 500 mg in sodium chloride 0.9 % 250 mL IVPB ([id number])     Order Specific Question:   Antimicrobial Indications     Answer:   Pneumonia (CAP)    0.9 % sodium chloride bolus    ipratropium-albuterol (DUONEB) nebulizer solution 1 ampule     Order Specific Question:   Initiate RT Bronchodilator Protocol     Answer:   Yes - ED Protocol    sodium chloride flush 0.9 % injection 5-40 mL    sodium chloride flush 0.9 % injection 5-40 mL    0.9 % sodium chloride infusion    OR Linked Order Group     acetaminophen (TYLENOL) tablet 650 mg     acetaminophen (TYLENOL) suppository 650 mg    AND Linked Order Group     cefTRIAXone (ROCEPHIN) 1,000 mg in sodium chloride 0.9 % 50 mL IVPB (mini-bag)      Order Specific Question: Antimicrobial Indications      Answer:   Pneumonia (CAP)      Order Specific Question:   CAP duration of therapy      Answer: Other      Order Specific Question:   Other CAP Duration      Answer:   6 more days     azithromycin (ZITHROMAX) 500 mg in sodium chloride 0.9 % 250 mL IVPB ([id number])      Order Specific Question:   Antimicrobial Indications      Answer:   Pneumonia (CAP)      Order Specific Question:   CAP duration of therapy      Answer: Other      Order Specific Question:   Other CAP Duration      Answer:   6 more days    enoxaparin Sodium (LOVENOX) injection 30 mg     Order Specific Question:   Indication of Use     Answer:   Prophylaxis-DVT/PE    0.9 % sodium chloride bolus    0.9 % sodium chloride infusion    albuterol (PROVENTIL) nebulizer solution 2.5 mg     Order Specific Question:   Initiate RT Bronchodilator Protocol     Answer:   Yes - Inpatient Protocol    tuberculin injection 5 Units         Signed:  Eduin Rosales MD    Part of this note may have been written by using a voice dictation software. The note has been proof read but may still contain some grammatical/other typographical errors.

## 2023-01-27 NOTE — WOUND CARE
Pt seen for LE wound, sacrum and groin. Pt turned to right side, sacrum intact, some pinching discoloration, recommend foam dressing changed every other day. LLE with compression wrap, removed dressing, no open area noted. Some minor irritation to anterior LLE, recommend foam dressing, changed daily. Groin with some irritation recommend nystatin powder to area daily  and moisture control. Wound team will continue to follow while in acute care setting.

## 2023-01-28 LAB
ANION GAP SERPL CALC-SCNC: 8 MMOL/L (ref 2–11)
BASOPHILS # BLD: 0 K/UL (ref 0–0.2)
BASOPHILS NFR BLD: 0 % (ref 0–2)
BUN SERPL-MCNC: 19 MG/DL (ref 6–23)
CALCIUM SERPL-MCNC: 8.6 MG/DL (ref 8.3–10.4)
CHLORIDE SERPL-SCNC: 110 MMOL/L (ref 101–110)
CO2 SERPL-SCNC: 24 MMOL/L (ref 21–32)
CREAT SERPL-MCNC: 0.8 MG/DL (ref 0.6–1)
DIFFERENTIAL METHOD BLD: ABNORMAL
EOSINOPHIL # BLD: 0 K/UL (ref 0–0.8)
EOSINOPHIL NFR BLD: 0 % (ref 0.5–7.8)
ERYTHROCYTE [DISTWIDTH] IN BLOOD BY AUTOMATED COUNT: 14.9 % (ref 11.9–14.6)
GLUCOSE SERPL-MCNC: 95 MG/DL (ref 65–100)
HCT VFR BLD AUTO: 34.7 % (ref 35.8–46.3)
HGB BLD-MCNC: 11.1 G/DL (ref 11.7–15.4)
IMM GRANULOCYTES # BLD AUTO: 0 K/UL (ref 0–0.5)
IMM GRANULOCYTES NFR BLD AUTO: 1 % (ref 0–5)
LYMPHOCYTES # BLD: 1.2 K/UL (ref 0.5–4.6)
LYMPHOCYTES NFR BLD: 33 % (ref 13–44)
MCH RBC QN AUTO: 28.9 PG (ref 26.1–32.9)
MCHC RBC AUTO-ENTMCNC: 32 G/DL (ref 31.4–35)
MCV RBC AUTO: 90.4 FL (ref 82–102)
MM INDURATION, POC: 0 MM (ref 0–5)
MONOCYTES # BLD: 0.3 K/UL (ref 0.1–1.3)
MONOCYTES NFR BLD: 8 % (ref 4–12)
NEUTS SEG # BLD: 2.2 K/UL (ref 1.7–8.2)
NEUTS SEG NFR BLD: 58 % (ref 43–78)
NRBC # BLD: 0 K/UL (ref 0–0.2)
NT PRO BNP: 141 PG/ML (ref 5–125)
PLATELET # BLD AUTO: 188 K/UL (ref 150–450)
PLATELET COMMENT: ADEQUATE
PMV BLD AUTO: 11.1 FL (ref 9.4–12.3)
POTASSIUM SERPL-SCNC: 4.7 MMOL/L (ref 3.5–5.1)
PPD, POC: NEGATIVE
RBC # BLD AUTO: 3.84 M/UL (ref 4.05–5.2)
RBC MORPH BLD: ABNORMAL
SODIUM SERPL-SCNC: 142 MMOL/L (ref 133–143)
WBC # BLD AUTO: 3.7 K/UL (ref 4.3–11.1)

## 2023-01-28 PROCEDURE — 2580000003 HC RX 258: Performed by: INTERNAL MEDICINE

## 2023-01-28 PROCEDURE — 80048 BASIC METABOLIC PNL TOTAL CA: CPT

## 2023-01-28 PROCEDURE — 6360000002 HC RX W HCPCS: Performed by: FAMILY MEDICINE

## 2023-01-28 PROCEDURE — 36415 COLL VENOUS BLD VENIPUNCTURE: CPT

## 2023-01-28 PROCEDURE — 6360000002 HC RX W HCPCS: Performed by: INTERNAL MEDICINE

## 2023-01-28 PROCEDURE — 94761 N-INVAS EAR/PLS OXIMETRY MLT: CPT

## 2023-01-28 PROCEDURE — 85025 COMPLETE CBC W/AUTO DIFF WBC: CPT

## 2023-01-28 PROCEDURE — 83880 ASSAY OF NATRIURETIC PEPTIDE: CPT

## 2023-01-28 PROCEDURE — 2700000000 HC OXYGEN THERAPY PER DAY

## 2023-01-28 PROCEDURE — 1100000000 HC RM PRIVATE

## 2023-01-28 PROCEDURE — 2580000003 HC RX 258: Performed by: FAMILY MEDICINE

## 2023-01-28 PROCEDURE — 6370000000 HC RX 637 (ALT 250 FOR IP): Performed by: INTERNAL MEDICINE

## 2023-01-28 RX ORDER — DOXEPIN HYDROCHLORIDE 50 MG/1
100 CAPSULE ORAL NIGHTLY
Status: DISCONTINUED | OUTPATIENT
Start: 2023-01-28 | End: 2023-02-06 | Stop reason: HOSPADM

## 2023-01-28 RX ORDER — OLANZAPINE 5 MG/1
20 TABLET ORAL NIGHTLY
Status: DISCONTINUED | OUTPATIENT
Start: 2023-01-28 | End: 2023-02-06 | Stop reason: HOSPADM

## 2023-01-28 RX ORDER — FUROSEMIDE 10 MG/ML
20 INJECTION INTRAMUSCULAR; INTRAVENOUS ONCE
Status: COMPLETED | OUTPATIENT
Start: 2023-01-28 | End: 2023-01-28

## 2023-01-28 RX ADMIN — CEFEPIME 2000 MG: 2 INJECTION, POWDER, FOR SOLUTION INTRAVENOUS at 05:08

## 2023-01-28 RX ADMIN — OLANZAPINE 20 MG: 5 TABLET, FILM COATED ORAL at 21:24

## 2023-01-28 RX ADMIN — SODIUM CHLORIDE, PRESERVATIVE FREE 5 ML: 5 INJECTION INTRAVENOUS at 12:57

## 2023-01-28 RX ADMIN — DOXEPIN HYDROCHLORIDE 100 MG: 50 CAPSULE ORAL at 21:24

## 2023-01-28 RX ADMIN — CEFEPIME 2000 MG: 2 INJECTION, POWDER, FOR SOLUTION INTRAVENOUS at 21:15

## 2023-01-28 RX ADMIN — SODIUM CHLORIDE, PRESERVATIVE FREE 10 ML: 5 INJECTION INTRAVENOUS at 09:11

## 2023-01-28 RX ADMIN — VANCOMYCIN HYDROCHLORIDE 1000 MG: 1 INJECTION, POWDER, LYOPHILIZED, FOR SOLUTION INTRAVENOUS at 12:56

## 2023-01-28 RX ADMIN — CEFEPIME 2000 MG: 2 INJECTION, POWDER, FOR SOLUTION INTRAVENOUS at 13:07

## 2023-01-28 RX ADMIN — FUROSEMIDE 20 MG: 10 INJECTION, SOLUTION INTRAMUSCULAR; INTRAVENOUS at 12:55

## 2023-01-28 RX ADMIN — SODIUM CHLORIDE, PRESERVATIVE FREE 5 ML: 5 INJECTION INTRAVENOUS at 21:22

## 2023-01-28 RX ADMIN — ENOXAPARIN SODIUM 30 MG: 100 INJECTION SUBCUTANEOUS at 09:11

## 2023-01-28 RX ADMIN — ENOXAPARIN SODIUM 30 MG: 100 INJECTION SUBCUTANEOUS at 21:22

## 2023-01-28 RX ADMIN — VANCOMYCIN HYDROCHLORIDE 1000 MG: 1 INJECTION, POWDER, LYOPHILIZED, FOR SOLUTION INTRAVENOUS at 23:03

## 2023-01-28 ASSESSMENT — PAIN SCALES - GENERAL: PAINLEVEL_OUTOF10: 0

## 2023-01-28 NOTE — PROGRESS NOTES
Alert  oxygen 50L 100%   appetite poor today  ate small amount of pudding and soup  family at bedside

## 2023-01-28 NOTE — PROGRESS NOTES
[address] [facility] Pharmacokinetic Monitoring Service - Vancomycin    Consulting Provider: adeel liz   Indication: hap  Target Concentration: Goal AUC/-600 mg*hr/L  Day of Therapy: 7 days  Additional Antimicrobials: cefepime    Patient eligible for piperacillin-tazobactam to cefepime auto-substitution per P&T approved protocol? YES    Pertinent Laboratory Values: Wt Readings from Last 1 Encounters:   01/26/23 (!) 310 lb (140.6 kg)     Temp Readings from Last 1 Encounters:   01/27/23 99 °F (37.2 °C) (Axillary)     Recent Labs     01/26/23  1313 01/26/23  1817 01/27/23  0503 01/27/23  1946   BUN 52* 53*  --  23   CREATININE 2.40* 2.50*  --  0.90   WBC 3.6*  --  4.7  --    PROCAL 0.12  --   --   --      Estimated Creatinine Clearance: 104 mL/min (based on SCr of 0.9 mg/dL). No results found for: Vianney Sins    MRSA Nasal Swab: not ordered.  Order placed by pharmacy      Assessment:  Date/Time Dose Concentration AUC   1/27 @ 2300  01/28 @ 1100 2500mg load  1gm q12hr    -   Note: Serum concentrations collected for AUC dosing may appear elevated if collected in close proximity to the dose administered, this is not necessarily an indication of toxicity    Plan:  Dosing recommendations based on Bayesian software  Start vancomycin 2500mg x1 then 1gm q12hr  Anticipated AUC of 514 mg/l/hr and trough concentration of 15mg/dl at steady state  Renal labs as indicated   Vancomycin concentrations will be ordered as clinically appropriate   Pharmacy will continue to monitor patient and adjust therapy as indicated    Thank you for the consult,  Tanya Vizcarra, [facility]

## 2023-01-28 NOTE — PROGRESS NOTES
VANCO DAILY FOLLOW UP NOTE  [address] [facility] Pharmacokinetic Monitoring Service - Vancomycin    Consulting Provider: Dr. Nettie Rodriguez   Indication: HAP  Target Concentration: Goal AUC/-600 mg*hr/L  Day of Therapy: 2 of 7  Additional Antimicrobials: cefepime    Patient eligible for piperacillin-tazobactam to cefepime auto-substitution per P&T approved protocol? N/A    Pertinent Laboratory Values: Wt Readings from Last 1 Encounters:   01/28/23 299 lb 13.2 oz (136 kg)     Temp Readings from Last 1 Encounters:   01/28/23 98.2 °F (36.8 °C) (Axillary)     Recent Labs     01/26/23  1313 01/26/23  1817 01/27/23  0503 01/27/23  1946 01/28/23  0402   BUN 52* 53*  --  23 19   CREATININE 2.40* 2.50*  --  0.90 0.80   WBC 3.6*  --  4.7  --  3.7*   PROCAL 0.12  --   --   --   --      Estimated Creatinine Clearance: 114 mL/min (based on SCr of 0.8 mg/dL). No results found for: Marty ZayasDi    MRSA Nasal Swab: not ordered.  Order placed by pharmacy    Assessment:  Date/Time Dose Concentration AUC         Note: Serum concentrations collected for AUC dosing may appear elevated if collected in close proximity to the dose administered, this is not necessarily an indication of toxicity    Plan:  Dosing recommendations based on Bayesian software  Continue vancomycin 1000 mg q12h  Anticipated AUC of 514 and trough concentration of 15 at steady state  Renal labs as indicated   Vancomycin concentration ordered for 1/29 @ 0400    Pharmacy will continue to monitor patient and adjust therapy as indicated    Thank you for the consult,  CORDELL Gray [facility]

## 2023-01-28 NOTE — PROGRESS NOTES
Hospitalist Progress Note   Admit Date:  2023 12:56 PM   Name:  Ed Collet   Age:  45 y.o. Sex:  female  :  1970   MRN:  [id number]   Room:  [facility]/    Presenting Complaint: Fatigue     Reason(s) for Admission: Dehydration [E86.0]  Acute respiratory failure with hypoxia ([address] [J96.01]  Sepsis ([address] [A41.9]  Acute renal failure, unspecified acute renal failure type ([address] [N17.9]  Other fatigue [R53.83]  Pneumonia of both lungs due to infectious organism, unspecified part of lung [J18.9]     History of Present Illness:   Ed Collet is a 45 y.o. female with a h/o schizophrenia and morbid obesity who presents today with complaints of weakness. Her mom is at bedside who provides the history. Patient developed a cough about 3 days ago. She had a fall yesterday and today seemed weaker and more confused. Oral intake has been reduced. Subjective fever noted today. Otherwise no chest pain, headaches, syncope, palpitations, N/V/D, abdo pain. Patient's mom, Onelia aDmian, had a cough and pneumonia recently and was treated with antibiotics, though never told she had COVID or influenza. VS noted tachycardia low 100s, BP improved after IVFs. Labs with mild leukopenia, sCr 2.4 (normal baseline), procal 0.13, normal LA. ABG showed pH 7.42, CO2 34. CXR with bilateral consolidations (L>R). She was given ceftriaxone via EMS and azithromycin was added in the ER. She has been given IVFs and BP improved. Rapid COVID is negative. We are consulted for admission and further management. 2023  Patient seen and evaluated.   Transition to Airvo yesterday   Chest x-ray done yesterday evening showed worsening bilateral infiltrates  She was started on vancomycin and Zosyn by the on-call  She denies cp,n/v or any other pain  Afebrile overnight    Assessment & Plan:   # Severe sepsis and acute hypoxemic respiratory failure 2/2 CAP vs viral pneumonia   - Leukopenia + tachycardia + infiltrates on CXR + EDU and encephalopathy (end organ damage). RA O2 sat 80% with EMS. Currently on 15L NRB with sats high 90s, so can wean that now. - Given IVFs and ceftriaxone with EMS, azithromycin given here in addition to another 1L bolus. Will add 1 more liter bolus followed by MIVFs. Rapid COVID neg, will check RVP. Con't with ceftriaxone and azithromycin. Monitor urine output. 1/28/2023  Continue antibiotics-   Follow-up cultures  Cleared by speech for diet. Given her worsening hypoxic resp failure- will have rt put her on airvo  Rpt cxr showed worsening infiltrates  She was started on vancomycin and Zosyn-switched to Vanco and cefepime per hospital protocol  Lasix 20mg iv x1       # Acute septic/metabolic encephalopathy   - ABG showed normal pH without hypocapnea   - Will hold schizophrenia medications today to avoid further sedation. 1/28/2023  Will start reinstating some of her schizophrenic medications       # EDU   - Likely sepsis and pre-renal. Normal baseline. Recheck tomorrow. Monitor urine output. Further work up if worsens or no improvement. 1/28/2023  Resolved      # Schizophrenia   - Managed by Dr. Jean Claude Schuler at [facility] (dx at age 13)   - Holding meds as above. Resume as soon as possible pending improvement in mentation. 1/28/2023  We will reinstate her Zyprexa 20 mg p.o. daily nightly  And doxepin AKA Sinequan 100 mg p.o. nightly  Consider reinstating Lexapro 20 mg daily and Cymbalta 60 mg extended release daily as needed    # Morbid obesity   - Complicates care. Anticipated discharge needs: TBD. She lives at home with family     diet: Continue NPO with sips/ice until seen by speech  VTE ppx: Lovenox BID  Code status: Discussed with mom at bedside, patient is full code. POA is mom, Penny Willams [phone].         Hospital Problems:  Principal Problem:    Severe sepsis ([facility]  Active Problems:    EDU (acute kidney injury) ([facility]    Schizophrenia ([facility]    Acute metabolic encephalopathy    Acute hypoxemic respiratory failure (HCC)    Morbid obesity ([facility]  Resolved Problems:    * No resolved hospital problems. *     Objective:   Patient Vitals for the past 24 hrs:   Temp Pulse Resp BP SpO2   01/28/23 1139 97.3 °F (36.3 °C) 96 22 112/77 93 %   01/28/23 0740 98.2 °F (36.8 °C) 85 20 107/68 92 %   01/28/23 0325 99 °F (37.2 °C) 93 18 107/69 96 %   01/28/23 0035 -- (!) 103 18 -- 92 %   01/27/23 2314 98.6 °F (37 °C) 100 16 104/66 94 %   01/27/23 1955 -- (!) 103 24 -- (!) 89 %   01/27/23 1951 99 °F (37.2 °C) (!) 106 28 99/79 93 %   01/27/23 1813 -- (!) 103 22 -- 94 %   01/27/23 1542 98.4 °F (36.9 °C) 100 (!) 34 (!) 112/94 90 %         Oxygen Therapy  SpO2: 93 %  Pulse Oximeter Device Mode: Continuous  Pulse Oximeter Device Location: Finger  O2 Device: Heated high flow cannula  Skin Assessment: Clean, dry, & intact  FiO2 : 91 %  O2 Flow Rate (L/min): 50 L/min    Estimated body mass index is 48.39 kg/m² as calculated from the following:    Height as of this encounter: 5' 6\" (1.676 m). Weight as of this encounter: 299 lb 13.2 oz (136 kg). Intake/Output Summary (Last 24 hours) at 1/28/2023 1234  Last data filed at 1/28/2023 0944  Gross per 24 hour   Intake 720 ml   Output 1700 ml   Net -980 ml           Physical Exam:  Blood pressure 112/77, pulse 96, temperature 97.3 °F (36.3 °C), temperature source Oral, resp. rate 22, height 5' 6\" (1.676 m), weight 299 lb 13.2 oz (136 kg), SpO2 93 %. General:    Morbidly obese. Ill appearing. Head:  Normocephalic, atraumatic. Eyes:  Sclerae appear normal.  Pupils equally round. ENT:  Nares appear normal.  Dry oral mucosa. Neck:  No restricted ROM. Trachea midline   CV:   RRR. No m/r/g. No jugular venous distension. Lungs:   Bilateral rales, no wheezes or rhonchi. 15L NRB, bedside sats high 90s. Abdomen:   Soft, nontender, nondistended. Extremities: No cyanosis or clubbing. No edema. Skin:     No rashes and normal coloration. Warm and dry.     Neuro:  Unable to assess. Lethargic but opens eyes and groans to voice and painful stimuli. Pupils are equal.  Psych:  Unable to fully assess.     I have personally reviewed labs and tests:  Recent Labs:  Recent Results (from the past 24 hour(s))   Basic Metabolic Panel w/ Reflex to MG    Collection Time: 01/27/23  7:46 PM   Result Value Ref Range    Sodium 139 133 - 143 mmol/L    Potassium 4.6 3.5 - 5.1 mmol/L    Chloride 108 101 - 110 mmol/L    CO2 26 21 - 32 mmol/L    Anion Gap 5 2 - 11 mmol/L    Glucose 116 (H) 65 - 100 mg/dL    BUN 23 6 - 23 MG/DL    Creatinine 0.90 0.6 - 1.0 MG/DL    Est, Glom Filt Rate >60 >60 ml/min/1.73m2    Calcium 8.6 8.3 - 10.4 MG/DL   CBC with Auto Differential    Collection Time: 01/28/23  4:02 AM   Result Value Ref Range    WBC 3.7 (L) 4.3 - 11.1 K/uL    RBC 3.84 (L) 4.05 - 5.2 M/uL    Hemoglobin 11.1 (L) 11.7 - 15.4 g/dL    Hematocrit 34.7 (L) 35.8 - 46.3 %    MCV 90.4 82 - 102 FL    MCH 28.9 26.1 - 32.9 PG    MCHC 32.0 31.4 - 35.0 g/dL    RDW 14.9 (H) 11.9 - 14.6 %    Platelets 222 775 - 798 K/uL    MPV 11.1 9.4 - 12.3 FL    nRBC 0.00 0.0 - 0.2 K/uL    Seg Neutrophils 58 43 - 78 %    Lymphocytes 33 13 - 44 %    Monocytes 8 4.0 - 12.0 %    Eosinophils % 0 (L) 0.5 - 7.8 %    Basophils 0 0.0 - 2.0 %    Immature Granulocytes 1 0.0 - 5.0 %    Segs Absolute 2.2 1.7 - 8.2 K/UL    Absolute Lymph # 1.2 0.5 - 4.6 K/UL    Absolute Mono # 0.3 0.1 - 1.3 K/UL    Absolute Eos # 0.0 0.0 - 0.8 K/UL    Basophils Absolute 0.0 0.0 - 0.2 K/UL    Absolute Immature Granulocyte 0.0 0.0 - 0.5 K/UL    RBC Comment NORMOCYTIC/NORMOCHROMIC      Platelet Comment ADEQUATE      Differential Type AUTOMATED     Basic Metabolic Panel w/ Reflex to MG    Collection Time: 01/28/23  4:02 AM   Result Value Ref Range    Sodium 142 133 - 143 mmol/L    Potassium 4.7 3.5 - 5.1 mmol/L    Chloride 110 101 - 110 mmol/L    CO2 24 21 - 32 mmol/L    Anion Gap 8 2 - 11 mmol/L    Glucose 95 65 - 100 mg/dL    BUN 19 6 - 23 MG/DL    Creatinine 0.80 0.6 - 1.0 MG/DL    Est, Glom Filt Rate >60 >60 ml/min/1.73m2    Calcium 8.6 8.3 - 10.4 MG/DL   Brain Natriuretic Peptide    Collection Time: 01/28/23 10:03 AM   Result Value Ref Range    NT Pro- (H) 5 - 125 PG/ML       I have personally reviewed imaging studies:  XR CHEST PORTABLE    Result Date: 1/26/2023  PORTABLE CHEST 1 VIEW HISTORY: Shortness of breath COMPARISON: 4/17/2021 FINDINGS: Consolidation is present throughout the left lung and in the right upper lobe. EKG leads are present. Lung volumes are diminished. Bilateral consolidation. Echocardiogram:  No results found for this or any previous visit. Orders Placed This Encounter   Medications    azithromycin (ZITHROMAX) 500 mg in sodium chloride 0.9 % 250 mL IVPB ([id number])     Order Specific Question:   Antimicrobial Indications     Answer:   Pneumonia (CAP)    0.9 % sodium chloride bolus    ipratropium-albuterol (DUONEB) nebulizer solution 1 ampule     Order Specific Question:   Initiate RT Bronchodilator Protocol     Answer:   Yes - ED Protocol    sodium chloride flush 0.9 % injection 5-40 mL    sodium chloride flush 0.9 % injection 5-40 mL    0.9 % sodium chloride infusion    OR Linked Order Group     acetaminophen (TYLENOL) tablet 650 mg     acetaminophen (TYLENOL) suppository 650 mg    DISCONTD: cefTRIAXone (ROCEPHIN) 1,000 mg in sodium chloride 0.9 % 50 mL IVPB (mini-bag)     Order Specific Question:   Antimicrobial Indications     Answer:   Pneumonia (CAP)     Order Specific Question:   CAP duration of therapy     Answer: Other     Order Specific Question:   Other CAP Duration     Answer:   6 more days    DISCONTD: azithromycin (ZITHROMAX) 500 mg in sodium chloride 0.9 % 250 mL IVPB ([id number])     Order Specific Question:   Antimicrobial Indications     Answer:   Pneumonia (CAP)     Order Specific Question:   CAP duration of therapy     Answer:    Other     Order Specific Question:   Other CAP Duration     Answer:   6 more days enoxaparin Sodium (LOVENOX) injection 30 mg     Order Specific Question:   Indication of Use     Answer:   Prophylaxis-DVT/PE    0.9 % sodium chloride bolus    0.9 % sodium chloride infusion    albuterol (PROVENTIL) nebulizer solution 2.5 mg     Order Specific Question:   Initiate RT Bronchodilator Protocol     Answer:   Yes - Inpatient Protocol    tuberculin injection 5 Units    cefepime (MAXIPIME) 2,000 mg in sodium chloride 0.9 % 50 mL IVPB (mini-bag)     Order Specific Question:   Antimicrobial Indications     Answer:   Pneumonia (HAP)     Order Specific Question:   HAP duration of therapy     Answer:   7 days    cefepime (MAXIPIME) 2,000 mg in sodium chloride 0.9 % 50 mL IVPB (mini-bag)     Order Specific Question:   Antimicrobial Indications     Answer:   Pneumonia (HAP)     Order Specific Question:   HAP duration of therapy     Answer:   7 days    vancomycin (VANCOCIN) 2500 mg in sodium chloride 0.9 % 500 mL IVPB     Order Specific Question:   Antimicrobial Indications     Answer:   Pneumonia (HAP)     Order Specific Question:   HAP duration of therapy     Answer:   7 days    vancomycin (VANCOCIN) 1,000 mg in sodium chloride 0.9 % 250 mL IVPB ([id number])     Order Specific Question:   Antimicrobial Indications     Answer:   Pneumonia (HAP)     Order Specific Question:   HAP duration of therapy     Answer:   7 days    furosemide (LASIX) injection 20 mg         Signed:  Jaden Marino MD    Part of this note may have been written by using a voice dictation software. The note has been proof read but may still contain some grammatical/other typographical errors.

## 2023-01-29 LAB
ANION GAP SERPL CALC-SCNC: 8 MMOL/L (ref 2–11)
BASOPHILS # BLD: 0.1 K/UL (ref 0–0.2)
BASOPHILS NFR BLD: 1 % (ref 0–2)
BUN SERPL-MCNC: 11 MG/DL (ref 6–23)
CALCIUM SERPL-MCNC: 8.6 MG/DL (ref 8.3–10.4)
CHLORIDE SERPL-SCNC: 106 MMOL/L (ref 101–110)
CO2 SERPL-SCNC: 24 MMOL/L (ref 21–32)
CREAT SERPL-MCNC: 0.6 MG/DL (ref 0.6–1)
CREAT SERPL-MCNC: 0.7 MG/DL (ref 0.6–1)
DIFFERENTIAL METHOD BLD: ABNORMAL
EOSINOPHIL # BLD: 0 K/UL (ref 0–0.8)
EOSINOPHIL NFR BLD: 0 % (ref 0.5–7.8)
ERYTHROCYTE [DISTWIDTH] IN BLOOD BY AUTOMATED COUNT: 14.6 % (ref 11.9–14.6)
GLUCOSE SERPL-MCNC: 101 MG/DL (ref 65–100)
HCT VFR BLD AUTO: 38.2 % (ref 35.8–46.3)
HGB BLD-MCNC: 11.9 G/DL (ref 11.7–15.4)
IMM GRANULOCYTES # BLD AUTO: 0.1 K/UL (ref 0–0.5)
IMM GRANULOCYTES NFR BLD AUTO: 2 % (ref 0–5)
LYMPHOCYTES # BLD: 1.1 K/UL (ref 0.5–4.6)
LYMPHOCYTES NFR BLD: 23 % (ref 13–44)
MCH RBC QN AUTO: 28.6 PG (ref 26.1–32.9)
MCHC RBC AUTO-ENTMCNC: 31.2 G/DL (ref 31.4–35)
MCV RBC AUTO: 91.8 FL (ref 82–102)
MONOCYTES # BLD: 0.5 K/UL (ref 0.1–1.3)
MONOCYTES NFR BLD: 11 % (ref 4–12)
NEUTS SEG # BLD: 3.1 K/UL (ref 1.7–8.2)
NEUTS SEG NFR BLD: 63 % (ref 43–78)
NRBC # BLD: 0 K/UL (ref 0–0.2)
PLATELET # BLD AUTO: 190 K/UL (ref 150–450)
PLATELET COMMENT: ADEQUATE
PMV BLD AUTO: 10 FL (ref 9.4–12.3)
POTASSIUM SERPL-SCNC: 4.4 MMOL/L (ref 3.5–5.1)
RBC # BLD AUTO: 4.16 M/UL (ref 4.05–5.2)
RBC MORPH BLD: ABNORMAL
SODIUM SERPL-SCNC: 138 MMOL/L (ref 133–143)
VANCOMYCIN SERPL-MCNC: 14.2 UG/ML
WBC # BLD AUTO: 4.9 K/UL (ref 4.3–11.1)
WBC MORPH BLD: ABNORMAL

## 2023-01-29 PROCEDURE — 6360000002 HC RX W HCPCS: Performed by: INTERNAL MEDICINE

## 2023-01-29 PROCEDURE — 6360000002 HC RX W HCPCS: Performed by: FAMILY MEDICINE

## 2023-01-29 PROCEDURE — 1100000000 HC RM PRIVATE

## 2023-01-29 PROCEDURE — 2700000000 HC OXYGEN THERAPY PER DAY

## 2023-01-29 PROCEDURE — 85025 COMPLETE CBC W/AUTO DIFF WBC: CPT

## 2023-01-29 PROCEDURE — 80048 BASIC METABOLIC PNL TOTAL CA: CPT

## 2023-01-29 PROCEDURE — 2580000003 HC RX 258: Performed by: INTERNAL MEDICINE

## 2023-01-29 PROCEDURE — 2580000003 HC RX 258: Performed by: FAMILY MEDICINE

## 2023-01-29 PROCEDURE — 80202 ASSAY OF VANCOMYCIN: CPT

## 2023-01-29 PROCEDURE — 6370000000 HC RX 637 (ALT 250 FOR IP): Performed by: INTERNAL MEDICINE

## 2023-01-29 PROCEDURE — 36415 COLL VENOUS BLD VENIPUNCTURE: CPT

## 2023-01-29 RX ORDER — FUROSEMIDE 10 MG/ML
20 INJECTION INTRAMUSCULAR; INTRAVENOUS ONCE
Status: COMPLETED | OUTPATIENT
Start: 2023-01-29 | End: 2023-01-29

## 2023-01-29 RX ADMIN — ENOXAPARIN SODIUM 30 MG: 100 INJECTION SUBCUTANEOUS at 09:58

## 2023-01-29 RX ADMIN — SODIUM CHLORIDE, PRESERVATIVE FREE 10 ML: 5 INJECTION INTRAVENOUS at 16:35

## 2023-01-29 RX ADMIN — FUROSEMIDE 20 MG: 10 INJECTION, SOLUTION INTRAMUSCULAR; INTRAVENOUS at 16:35

## 2023-01-29 RX ADMIN — SODIUM CHLORIDE, PRESERVATIVE FREE 10 ML: 5 INJECTION INTRAVENOUS at 21:33

## 2023-01-29 RX ADMIN — DOXEPIN HYDROCHLORIDE 100 MG: 50 CAPSULE ORAL at 21:33

## 2023-01-29 RX ADMIN — OLANZAPINE 20 MG: 5 TABLET, FILM COATED ORAL at 21:33

## 2023-01-29 RX ADMIN — ENOXAPARIN SODIUM 30 MG: 100 INJECTION SUBCUTANEOUS at 21:32

## 2023-01-29 RX ADMIN — SODIUM CHLORIDE, PRESERVATIVE FREE 10 ML: 5 INJECTION INTRAVENOUS at 16:37

## 2023-01-29 RX ADMIN — VANCOMYCIN HYDROCHLORIDE 1750 MG: 10 INJECTION, POWDER, LYOPHILIZED, FOR SOLUTION INTRAVENOUS at 22:10

## 2023-01-29 RX ADMIN — CEFEPIME 2000 MG: 2 INJECTION, POWDER, FOR SOLUTION INTRAVENOUS at 21:28

## 2023-01-29 RX ADMIN — CEFEPIME 2000 MG: 2 INJECTION, POWDER, FOR SOLUTION INTRAVENOUS at 13:19

## 2023-01-29 RX ADMIN — SODIUM CHLORIDE, PRESERVATIVE FREE 10 ML: 5 INJECTION INTRAVENOUS at 09:00

## 2023-01-29 RX ADMIN — CEFEPIME 2000 MG: 2 INJECTION, POWDER, FOR SOLUTION INTRAVENOUS at 05:23

## 2023-01-29 RX ADMIN — VANCOMYCIN HYDROCHLORIDE 1750 MG: 10 INJECTION, POWDER, LYOPHILIZED, FOR SOLUTION INTRAVENOUS at 11:11

## 2023-01-29 NOTE — PROGRESS NOTES
Hospitalist Progress Note   Admit Date:  2023 12:56 PM   Name:  Raul Espinoza   Age:  45 y.o. Sex:  female  :  1970   MRN:  [id number]   Room:  [facility]    Presenting Complaint: Fatigue     Reason(s) for Admission: Dehydration [E86.0]  Acute respiratory failure with hypoxia ([address] [J96.01]  Sepsis ([address] [A41.9]  Acute renal failure, unspecified acute renal failure type ([address] [N17.9]  Other fatigue [R53.83]  Pneumonia of both lungs due to infectious organism, unspecified part of lung [J18.9]     History of Present Illness:   Raul Espinoza is a 45 y.o. female with a h/o schizophrenia and morbid obesity who presents today with complaints of weakness. Her mom is at bedside who provides the history. Patient developed a cough about 3 days ago. She had a fall yesterday and today seemed weaker and more confused. Oral intake has been reduced. Subjective fever noted today. Otherwise no chest pain, headaches, syncope, palpitations, N/V/D, abdo pain. Patient's mom, Michael Oviedo, had a cough and pneumonia recently and was treated with antibiotics, though never told she had COVID or influenza. VS noted tachycardia low 100s, BP improved after IVFs. Labs with mild leukopenia, sCr 2.4 (normal baseline), procal 0.13, normal LA. ABG showed pH 7.42, CO2 34. CXR with bilateral consolidations (L>R). She was given ceftriaxone via EMS and azithromycin was added in the ER. She has been given IVFs and BP improved. Rapid COVID is negative. We are consulted for admission and further management. 2023  Patient seen and evaluated.   Transition to [facility] [place] 2023  Chest x-ray done 2023 evening showed worsening bilateral infiltrates consistent with volume overload  She was started on vancomycin and Zosyn by the on-call  She denies cp,n/v or any other pain  Afebrile overnight    Assessment & Plan:   # Severe sepsis and acute hypoxemic respiratory failure 2/2 CAP vs viral pneumonia   - Leukopenia + tachycardia + infiltrates on CXR + EDU and encephalopathy (end organ damage). RA O2 sat 80% with EMS. Currently on 15L NRB with sats high 90s, so can wean that now. - Given IVFs and ceftriaxone with EMS, azithromycin given here in addition to another 1L bolus. Will add 1 more liter bolus followed by MIVFs. Rapid COVID neg, will check RVP. Con't with ceftriaxone and azithromycin. Monitor urine output. 1/29/2023  Continue antibiotics-   Follow-up cultures  Cleared by speech for diet. Currently on Airvo will wean as tolerated  Rpt cxr showed worsening infiltrates  She was started on vancomycin and Zosyn-switched to Vanco and cefepime per hospital protocol  Lasix 20mg iv x1       # Acute septic/metabolic encephalopathy   - ABG showed normal pH without hypocapnea   - Will hold schizophrenia medications today to avoid further sedation. 1/29/2023  Continue Zyprexa and and doxepin      # EDU   - Likely sepsis and pre-renal. Normal baseline. Recheck tomorrow. Monitor urine output. Further work up if worsens or no improvement. 1/29/2023  Resolved      # Schizophrenia   - Managed by Dr. Allyn Carrillo at [facility] (dx at age 13)   - Holding meds as above. Resume as soon as possible pending improvement in mentation. 1/29/2023  Continue Zyprexa 20 mg p.o. daily nightly  Continue doxepin AKA Sinequan 100 mg p.o. nightly  Consider reinstating Lexapro 20 mg daily and Cymbalta 60 mg extended release daily as needed    # Morbid obesity   - Complicates care. Anticipated discharge needs: TBD. She lives at home with family     diet: Continue NPO with sips/ice until seen by speech  VTE ppx: Lovenox BID  Code status: Discussed with mom at bedside, patient is full code. POA is mom, Viky Londono [phone].         Hospital Problems:  Principal Problem:    Severe sepsis ([address]  Active Problems:    EDU (acute kidney injury) ([address]    Schizophrenia ([facility]    Acute metabolic encephalopathy    Acute hypoxemic respiratory failure ([facility] Morbid obesity ([address]  Resolved Problems:    * No resolved hospital problems. *     Objective:   Patient Vitals for the past 24 hrs:   Temp Pulse Resp BP SpO2   01/29/23 0408 98.4 °F (36.9 °C) 99 22 133/75 95 %   01/28/23 2318 98.6 °F (37 °C) 96 20 107/71 97 %   01/28/23 1518 98.4 °F (36.9 °C) 96 16 109/78 98 %   01/28/23 1234 -- 95 24 -- 95 %   01/28/23 1139 97.3 °F (36.3 °C) 96 22 112/77 93 %         Oxygen Therapy  SpO2: 95 %  Pulse Oximeter Device Mode: Continuous  Pulse Oximeter Device Location: Finger  O2 Device: Heated high flow cannula  Skin Assessment: Clean, dry, & intact  FiO2 : 100 %  O2 Flow Rate (L/min): 50 L/min    Estimated body mass index is 47.89 kg/m² as calculated from the following:    Height as of this encounter: 5' 6\" (1.676 m). Weight as of this encounter: 296 lb 11.8 oz (134.6 kg). Intake/Output Summary (Last 24 hours) at 1/29/2023 0744  Last data filed at 1/29/2023 0538  Gross per 24 hour   Intake 0 ml   Output 1950 ml   Net -1950 ml           Physical Exam:  Blood pressure 133/75, pulse 99, temperature 98.4 °F (36.9 °C), temperature source Axillary, resp. rate 22, height 5' 6\" (1.676 m), weight 296 lb 11.8 oz (134.6 kg), SpO2 95 %. General:    Morbidly obese. Ill appearing. Head:  Normocephalic, atraumatic. Eyes:  Sclerae appear normal.  Pupils equally round. ENT:  Nares appear normal.  Dry oral mucosa. Neck:  No restricted ROM. Trachea midline   CV:   RRR. No m/r/g. No jugular venous distension. Lungs:   Bilateral rales, no wheezes or rhonchi. 15L NRB, bedside sats high 90s. Abdomen:   Soft, nontender, nondistended. Extremities: No cyanosis or clubbing. No edema. Skin:     No rashes and normal coloration. Warm and dry. Neuro:  Unable to assess. Lethargic but opens eyes and groans to voice and painful stimuli. Pupils are equal.  Psych:  Unable to fully assess.     I have personally reviewed labs and tests:  Recent Labs:  Recent Results (from the past 24 hour(s)) Brain Natriuretic Peptide    Collection Time: 01/28/23 10:03 AM   Result Value Ref Range    NT Pro- (H) 5 - 125 PG/ML   Creatinine    Collection Time: 01/29/23  4:02 AM   Result Value Ref Range    Creatinine 0.60 0.6 - 1.0 MG/DL   Vancomycin Level, Random    Collection Time: 01/29/23  4:02 AM   Result Value Ref Range    Vancomycin Rm 14.2 UG/ML       I have personally reviewed imaging studies:  XR CHEST PORTABLE    Result Date: 1/26/2023  PORTABLE CHEST 1 VIEW HISTORY: Shortness of breath COMPARISON: 4/17/2021 FINDINGS: Consolidation is present throughout the left lung and in the right upper lobe. EKG leads are present. Lung volumes are diminished. Bilateral consolidation. Echocardiogram:  No results found for this or any previous visit. Orders Placed This Encounter   Medications    azithromycin (ZITHROMAX) 500 mg in sodium chloride 0.9 % 250 mL IVPB ([id number])     Order Specific Question:   Antimicrobial Indications     Answer:   Pneumonia (CAP)    0.9 % sodium chloride bolus    ipratropium-albuterol (DUONEB) nebulizer solution 1 ampule     Order Specific Question:   Initiate RT Bronchodilator Protocol     Answer:   Yes - ED Protocol    sodium chloride flush 0.9 % injection 5-40 mL    sodium chloride flush 0.9 % injection 5-40 mL    0.9 % sodium chloride infusion    OR Linked Order Group     acetaminophen (TYLENOL) tablet 650 mg     acetaminophen (TYLENOL) suppository 650 mg    DISCONTD: cefTRIAXone (ROCEPHIN) 1,000 mg in sodium chloride 0.9 % 50 mL IVPB (mini-bag)     Order Specific Question:   Antimicrobial Indications     Answer:   Pneumonia (CAP)     Order Specific Question:   CAP duration of therapy     Answer:    Other     Order Specific Question:   Other CAP Duration     Answer:   6 more days    DISCONTD: azithromycin (ZITHROMAX) 500 mg in sodium chloride 0.9 % 250 mL IVPB ([id number])     Order Specific Question:   Antimicrobial Indications     Answer:   Pneumonia (CAP)     Order Specific Question:   CAP duration of therapy     Answer: Other     Order Specific Question:   Other CAP Duration     Answer:   6 more days    enoxaparin Sodium (LOVENOX) injection 30 mg     Order Specific Question:   Indication of Use     Answer:   Prophylaxis-DVT/PE    0.9 % sodium chloride bolus    0.9 % sodium chloride infusion    albuterol (PROVENTIL) nebulizer solution 2.5 mg     Order Specific Question:   Initiate RT Bronchodilator Protocol     Answer:   Yes - Inpatient Protocol    tuberculin injection 5 Units    cefepime (MAXIPIME) 2,000 mg in sodium chloride 0.9 % 50 mL IVPB (mini-bag)     Order Specific Question:   Antimicrobial Indications     Answer:   Pneumonia (HAP)     Order Specific Question:   HAP duration of therapy     Answer:   7 days    cefepime (MAXIPIME) 2,000 mg in sodium chloride 0.9 % 50 mL IVPB (mini-bag)     Order Specific Question:   Antimicrobial Indications     Answer:   Pneumonia (HAP)     Order Specific Question:   HAP duration of therapy     Answer:   7 days    vancomycin (VANCOCIN) 2500 mg in sodium chloride 0.9 % 500 mL IVPB     Order Specific Question:   Antimicrobial Indications     Answer:   Pneumonia (HAP)     Order Specific Question:   HAP duration of therapy     Answer:   7 days    vancomycin (VANCOCIN) 1,000 mg in sodium chloride 0.9 % 250 mL IVPB ([id number])     Order Specific Question:   Antimicrobial Indications     Answer:   Pneumonia (HAP)     Order Specific Question:   HAP duration of therapy     Answer:   7 days    furosemide (LASIX) injection 20 mg    OLANZapine (ZYPREXA) tablet 20 mg    doxepin (SINEQUAN) capsule 100 mg         Signed:  Umer Gutiérrez MD    Part of this note may have been written by using a voice dictation software. The note has been proof read but may still contain some grammatical/other typographical errors.

## 2023-01-29 NOTE — PROGRESS NOTES
VANCO DAILY FOLLOW UP NOTE  [facility] Pharmacokinetic Monitoring Service - Vancomycin    Consulting Provider: Dr. Suresh Sic   Indication: HAP  Target Concentration: Goal AUC/-600 mg*hr/L  Day of Therapy: 3 of 7  Additional Antimicrobials: cefepime    Patient eligible for piperacillin-tazobactam to cefepime auto-substitution per P&T approved protocol? N/A    Pertinent Laboratory Values: Wt Readings from Last 1 Encounters:   01/29/23 296 lb 11.8 oz (134.6 kg)     Temp Readings from Last 1 Encounters:   01/29/23 98 °F (36.7 °C) (Oral)     Recent Labs     01/26/23  1313 01/26/23  1817 01/27/23  0503 01/27/23  1946 01/28/23  0402 01/29/23  0402 01/29/23  0826   BUN 52*   < >  --  23 19  --  11   CREATININE 2.40*   < >  --  0.90 0.80 0.60 0.70   WBC 3.6*  --  4.7  --  3.7*  --  4.9   PROCAL 0.12  --   --   --   --   --   --     < > = values in this interval not displayed. Estimated Creatinine Clearance: 130 mL/min (based on SCr of 0.7 mg/dL). Lab Results   Component Value Date/Time    VANCORANDOM 14.2 01/29/2023 04:02 AM     MRSA Nasal Swab: not ordered.  Order placed by pharmacy    Assessment:  Date/Time Dose Concentration AUC   1/29 0402 1000 mg q12h 14.2 276   Note: Serum concentrations collected for AUC dosing may appear elevated if collected in close proximity to the dose administered, this is not necessarily an indication of toxicity    Plan:  Current dosing regimen is sub-therapeutic  Increase dose to 1750 mg q12h for predicted AUC/Tr of 484/10.3  Repeat vancomycin concentrations will be ordered as clinically appropriate   Pharmacy will continue to monitor patient and adjust therapy as indicated    Thank you for the consult,  Minh Hatfield [facility]

## 2023-01-29 NOTE — PROGRESS NOTES
Patient resting in bed. Respirations even and unlabored. On Airvo 50L 100%. Pure wick in place and connected to suction, draining yellow urine. No signs of distress. Safety measures in place. Preparing to give report to oncoming nurse.

## 2023-01-30 LAB
ANION GAP SERPL CALC-SCNC: 9 MMOL/L (ref 2–11)
BUN SERPL-MCNC: 8 MG/DL (ref 6–23)
CALCIUM SERPL-MCNC: 8.5 MG/DL (ref 8.3–10.4)
CHLORIDE SERPL-SCNC: 104 MMOL/L (ref 101–110)
CO2 SERPL-SCNC: 29 MMOL/L (ref 21–32)
CREAT SERPL-MCNC: 0.6 MG/DL (ref 0.6–1)
GLUCOSE SERPL-MCNC: 91 MG/DL (ref 65–100)
POTASSIUM SERPL-SCNC: 3.9 MMOL/L (ref 3.5–5.1)
SODIUM SERPL-SCNC: 142 MMOL/L (ref 133–143)

## 2023-01-30 PROCEDURE — 2580000003 HC RX 258: Performed by: FAMILY MEDICINE

## 2023-01-30 PROCEDURE — 94761 N-INVAS EAR/PLS OXIMETRY MLT: CPT

## 2023-01-30 PROCEDURE — 6360000002 HC RX W HCPCS: Performed by: FAMILY MEDICINE

## 2023-01-30 PROCEDURE — 6370000000 HC RX 637 (ALT 250 FOR IP): Performed by: INTERNAL MEDICINE

## 2023-01-30 PROCEDURE — 6360000002 HC RX W HCPCS: Performed by: INTERNAL MEDICINE

## 2023-01-30 PROCEDURE — 2580000003 HC RX 258: Performed by: INTERNAL MEDICINE

## 2023-01-30 PROCEDURE — 36415 COLL VENOUS BLD VENIPUNCTURE: CPT

## 2023-01-30 PROCEDURE — 92526 ORAL FUNCTION THERAPY: CPT

## 2023-01-30 PROCEDURE — 80048 BASIC METABOLIC PNL TOTAL CA: CPT

## 2023-01-30 PROCEDURE — 1100000000 HC RM PRIVATE

## 2023-01-30 PROCEDURE — 94640 AIRWAY INHALATION TREATMENT: CPT

## 2023-01-30 PROCEDURE — 2700000000 HC OXYGEN THERAPY PER DAY

## 2023-01-30 RX ORDER — IPRATROPIUM BROMIDE AND ALBUTEROL SULFATE 2.5; .5 MG/3ML; MG/3ML
1 SOLUTION RESPIRATORY (INHALATION)
Status: DISCONTINUED | OUTPATIENT
Start: 2023-01-30 | End: 2023-02-06 | Stop reason: HOSPADM

## 2023-01-30 RX ORDER — BUDESONIDE 0.5 MG/2ML
0.5 INHALANT ORAL 2 TIMES DAILY
Status: DISCONTINUED | OUTPATIENT
Start: 2023-01-30 | End: 2023-02-06 | Stop reason: HOSPADM

## 2023-01-30 RX ADMIN — ENOXAPARIN SODIUM 30 MG: 100 INJECTION SUBCUTANEOUS at 21:25

## 2023-01-30 RX ADMIN — IPRATROPIUM BROMIDE AND ALBUTEROL SULFATE 1 AMPULE: 2.5; .5 SOLUTION RESPIRATORY (INHALATION) at 19:52

## 2023-01-30 RX ADMIN — VANCOMYCIN HYDROCHLORIDE 1750 MG: 10 INJECTION, POWDER, LYOPHILIZED, FOR SOLUTION INTRAVENOUS at 09:22

## 2023-01-30 RX ADMIN — CEFEPIME 2000 MG: 2 INJECTION, POWDER, FOR SOLUTION INTRAVENOUS at 12:56

## 2023-01-30 RX ADMIN — VANCOMYCIN HYDROCHLORIDE 1750 MG: 10 INJECTION, POWDER, LYOPHILIZED, FOR SOLUTION INTRAVENOUS at 21:26

## 2023-01-30 RX ADMIN — DOXEPIN HYDROCHLORIDE 100 MG: 50 CAPSULE ORAL at 21:26

## 2023-01-30 RX ADMIN — ENOXAPARIN SODIUM 30 MG: 100 INJECTION SUBCUTANEOUS at 08:51

## 2023-01-30 RX ADMIN — IPRATROPIUM BROMIDE AND ALBUTEROL SULFATE 1 AMPULE: 2.5; .5 SOLUTION RESPIRATORY (INHALATION) at 11:44

## 2023-01-30 RX ADMIN — BUDESONIDE 500 MCG: 0.5 INHALANT RESPIRATORY (INHALATION) at 19:52

## 2023-01-30 RX ADMIN — SODIUM CHLORIDE, PRESERVATIVE FREE 10 ML: 5 INJECTION INTRAVENOUS at 09:03

## 2023-01-30 RX ADMIN — CEFEPIME 2000 MG: 2 INJECTION, POWDER, FOR SOLUTION INTRAVENOUS at 21:26

## 2023-01-30 RX ADMIN — IPRATROPIUM BROMIDE AND ALBUTEROL SULFATE 1 AMPULE: 2.5; .5 SOLUTION RESPIRATORY (INHALATION) at 15:39

## 2023-01-30 RX ADMIN — CEFEPIME 2000 MG: 2 INJECTION, POWDER, FOR SOLUTION INTRAVENOUS at 05:03

## 2023-01-30 RX ADMIN — OLANZAPINE 20 MG: 5 TABLET, FILM COATED ORAL at 21:25

## 2023-01-30 RX ADMIN — SODIUM CHLORIDE, PRESERVATIVE FREE 10 ML: 5 INJECTION INTRAVENOUS at 21:26

## 2023-01-30 ASSESSMENT — PULMONARY FUNCTION TESTS: PEFR_L/MIN: 96

## 2023-01-30 ASSESSMENT — PAIN SCALES - GENERAL: PAINLEVEL_OUTOF10: 0

## 2023-01-30 NOTE — PROGRESS NOTES
Pt resting in bed. Respirations even and unlabored, on airvo 50 L, 60%. Pure wick in place and connected to suction. Cefepime infusing. No signs of distress, no needs expressed at this time. Safety measures in place and call light within reach. No Residual Tumor Seen Histology Text: There were no malignant cells seen in the sections examined.

## 2023-01-30 NOTE — PROGRESS NOTES
Weaned the patient to 50L/50% FIO2 at this time per doctor's orders. The patient's Sp02 is 92% . No distress is noted upon leaving the patient's room.

## 2023-01-30 NOTE — PROGRESS NOTES
Pt resting in bed, with mom at bedside. Respirations even and unlabored, on airvo 50 L, 60%. Pure wick in place and connected to suction. No signs of distress, no needs expressed at this time. Safety measures in place and call light within reach.

## 2023-01-30 NOTE — PROGRESS NOTES
Hospitalist Progress Note   Admit Date:  2023 12:56 PM   Name:  Lila Contreras   Age:  45 y.o. Sex:  female  :  1970   MRN:  [id number]   Room:  [facility]/    Presenting Complaint: Fatigue     Reason(s) for Admission: Dehydration [E86.0]  Acute respiratory failure with hypoxia ([address] [J96.01]  Sepsis ([address] [A41.9]  Acute renal failure, unspecified acute renal failure type ([address] [N17.9]  Other fatigue [R53.83]  Pneumonia of both lungs due to infectious organism, unspecified part of lung [J18.9]     History of Present Illness:   Lila Contreras is a 45 y.o. female with a h/o schizophrenia and morbid obesity who presents today with complaints of weakness. Her mom is at bedside who provides the history. Patient developed a cough about 3 days ago. She had a fall yesterday and today seemed weaker and more confused. Oral intake has been reduced. Subjective fever noted today. Otherwise no chest pain, headaches, syncope, palpitations, N/V/D, abdo pain. Patient's mom, Radha Mcmillan, had a cough and pneumonia recently and was treated with antibiotics, though never told she had COVID or influenza. VS noted tachycardia low 100s, BP improved after IVFs. Labs with mild leukopenia, sCr 2.4 (normal baseline), procal 0.13, normal LA. ABG showed pH 7.42, CO2 34. CXR with bilateral consolidations (L>R). She was given ceftriaxone via EMS and azithromycin was added in the ER. She has been given IVFs and BP improved. Rapid COVID is negative. We are consulted for admission and further management. 2023  Patient seen and evaluated. Transition to [facility] 2023  Chest x-ray done 2023 evening showed worsening bilateral infiltrates consistent with volume overload  She was started on vancomycin and Zosyn by the on-call  She has had 2 doses of IV Lasix  Wheezing on exam this a.m.   She denies cp,n/v or any other pain  Afebrile overnight    Assessment & Plan:   # Severe sepsis and acute hypoxemic respiratory failure 2/2 CAP vs viral pneumonia   - Leukopenia + tachycardia + infiltrates on CXR + EDU and encephalopathy (end organ damage). RA O2 sat 80% with EMS. Currently on 15L NRB with sats high 90s, so can wean that now. - Given IVFs and ceftriaxone with EMS, azithromycin given here in addition to another 1L bolus. Will add 1 more liter bolus followed by MIVFs. Rapid COVID neg, will check RVP. Con't with ceftriaxone and azithromycin. Monitor urine output. 1/30/2023  Continue antibiotics-   Follow-up cultures  Cleared by speech for diet. Currently remains on on Airvo   Rpt cxr showed worsening infiltrates  She was started on vancomycin and Zosyn-switched to Vanco and cefepime per hospital protocol  She is status post Lasix 20mg iv x 2 with appropriate output  We will ask respiratory to attempt to wean off Airvo  Will start DuoNebs with bronchospasms noted today      # Acute septic/metabolic encephalopathy   - ABG showed normal pH without hypocapnea   - Will hold schizophrenia medications today to avoid further sedation. 1/30/2023  Continue Zyprexa and and doxepin      # EDU   - Likely sepsis and pre-renal. Normal baseline. Recheck tomorrow. Monitor urine output. Further work up if worsens or no improvement. 1/30/2023  Resolved      # Schizophrenia   - Managed by Dr. Ranjith Quintero at [facility] (dx at age 13)   - Holding meds as above. Resume as soon as possible pending improvement in mentation. 1/30/2023  Continue Zyprexa 20 mg p.o. daily nightly  Continue doxepin AKA Sinequan 100 mg p.o. nightly  Consider reinstating Lexapro 20 mg daily and Cymbalta 60 mg extended release daily as needed    # Morbid obesity   - Complicates care. Anticipated discharge needs: TBD. She lives at home with family     diet: Continue NPO with sips/ice until seen by speech  VTE ppx: Lovenox BID  Code status: Discussed with mom at bedside, patient is full code. POA is mom, Zacarias Smithsimi [phone].         [facility] Problems:  Principal Problem:    Severe sepsis ([facility]  Active Problems:    EDU (acute kidney injury) ([facility]    Schizophrenia ([facility]    Acute metabolic encephalopathy    Acute hypoxemic respiratory failure ([facility]    Morbid obesity ([facility] [facility]  Resolved Problems:    * No resolved hospital problems. *     Objective:   Patient Vitals for the past 24 hrs:   Temp Pulse Resp BP SpO2   01/30/23 0721 98.2 °F (36.8 °C) 93 18 (!) 134/91 99 %   01/30/23 0258 98.6 °F (37 °C) 97 20 138/83 96 %   01/30/23 0022 99.3 °F (37.4 °C) 97 20 (!) 140/84 96 %   01/29/23 1931 98.2 °F (36.8 °C) (!) 103 18 113/68 --   01/29/23 1451 97.8 °F (36.6 °C) 99 20 (!) 140/86 99 %   01/29/23 1130 97.9 °F (36.6 °C) 94 20 133/86 100 %   01/29/23 0830 98 °F (36.7 °C) (!) 104 22 131/85 94 %         Oxygen Therapy  SpO2: 99 %  Pulse Oximeter Device Mode: Continuous  Pulse Oximeter Device Location: Finger  O2 Device: Heated high flow cannula  Skin Assessment: Clean, dry, & intact  FiO2 : 60 %  O2 Flow Rate (L/min): 50 L/min    Estimated body mass index is 47.11 kg/m² as calculated from the following:    Height as of this encounter: 5' 6\" (1.676 m). Weight as of this encounter: 291 lb 14.2 oz (132.4 kg). Intake/Output Summary (Last 24 hours) at 1/30/2023 0804  Last data filed at 1/29/2023 1806  Gross per 24 hour   Intake 200 ml   Output 2000 ml   Net -1800 ml           Physical Exam:  Blood pressure (!) 134/91, pulse 93, temperature 98.2 °F (36.8 °C), temperature source Axillary, resp. rate 18, height 5' 6\" (1.676 m), weight 291 lb 14.2 oz (132.4 kg), SpO2 99 %. General:    Morbidly obese. Ill appearing. Head:  Normocephalic, atraumatic. Eyes:  Sclerae appear normal.  Pupils equally round. ENT:  Nares appear normal.  Dry oral mucosa. Neck:  No restricted ROM. Trachea midline   CV:   RRR. No m/r/g. No jugular venous distension. Lungs:   Bilateral rales, no wheezes or rhonchi. 15L NRB, bedside sats high 90s.    Abdomen:   Soft, nontender, nondistended. Extremities: No cyanosis or clubbing. No edema. Skin:     No rashes and normal coloration. Warm and dry. Neuro:  Unable to assess. Lethargic but opens eyes and groans to voice and painful stimuli. Pupils are equal.  Psych:  Unable to fully assess.     I have personally reviewed labs and tests:  Recent Labs:  Recent Results (from the past 24 hour(s))   Basic Metabolic Panel w/ Reflex to MG    Collection Time: 01/29/23  8:26 AM   Result Value Ref Range    Sodium 138 133 - 143 mmol/L    Potassium 4.4 3.5 - 5.1 mmol/L    Chloride 106 101 - 110 mmol/L    CO2 24 21 - 32 mmol/L    Anion Gap 8 2 - 11 mmol/L    Glucose 101 (H) 65 - 100 mg/dL    BUN 11 6 - 23 MG/DL    Creatinine 0.70 0.6 - 1.0 MG/DL    Est, Glom Filt Rate >60 >60 ml/min/1.73m2    Calcium 8.6 8.3 - 10.4 MG/DL   CBC with Auto Differential    Collection Time: 01/29/23  8:26 AM   Result Value Ref Range    WBC 4.9 4.3 - 11.1 K/uL    RBC 4.16 4.05 - 5.2 M/uL    Hemoglobin 11.9 11.7 - 15.4 g/dL    Hematocrit 38.2 35.8 - 46.3 %    MCV 91.8 82 - 102 FL    MCH 28.6 26.1 - 32.9 PG    MCHC 31.2 (L) 31.4 - 35.0 g/dL    RDW 14.6 11.9 - 14.6 %    Platelets 814 302 - 798 K/uL    MPV 10.0 9.4 - 12.3 FL    nRBC 0.00 0.0 - 0.2 K/uL    Seg Neutrophils 63 43 - 78 %    Lymphocytes 23 13 - 44 %    Monocytes 11 4.0 - 12.0 %    Eosinophils % 0 (L) 0.5 - 7.8 %    Basophils 1 0.0 - 2.0 %    Immature Granulocytes 2 0.0 - 5.0 %    Segs Absolute 3.1 1.7 - 8.2 K/UL    Absolute Lymph # 1.1 0.5 - 4.6 K/UL    Absolute Mono # 0.5 0.1 - 1.3 K/UL    Absolute Eos # 0.0 0.0 - 0.8 K/UL    Basophils Absolute 0.1 0.0 - 0.2 K/UL    Absolute Immature Granulocyte 0.1 0.0 - 0.5 K/UL    RBC Comment SLIGHT  ANISOCYTOSIS + POIKILOCYTOSIS        WBC Comment ATYPICAL LYMPHOCYTES PRESENT      Platelet Comment ADEQUATE      Differential Type AUTOMATED     Basic Metabolic Panel w/ Reflex to MG    Collection Time: 01/30/23  3:29 AM   Result Value Ref Range    Sodium 142 133 - 143 mmol/L Potassium 3.9 3.5 - 5.1 mmol/L    Chloride 104 101 - 110 mmol/L    CO2 29 21 - 32 mmol/L    Anion Gap 9 2 - 11 mmol/L    Glucose 91 65 - 100 mg/dL    BUN 8 6 - 23 MG/DL    Creatinine 0.60 0.6 - 1.0 MG/DL    Est, Glom Filt Rate >60 >60 ml/min/1.73m2    Calcium 8.5 8.3 - 10.4 MG/DL       I have personally reviewed imaging studies:  XR CHEST PORTABLE    Result Date: 1/26/2023  PORTABLE CHEST 1 VIEW HISTORY: Shortness of breath COMPARISON: 4/17/2021 FINDINGS: Consolidation is present throughout the left lung and in the right upper lobe. EKG leads are present. Lung volumes are diminished. Bilateral consolidation. Echocardiogram:  No results found for this or any previous visit. Orders Placed This Encounter   Medications    azithromycin (ZITHROMAX) 500 mg in sodium chloride 0.9 % 250 mL IVPB ([id number])     Order Specific Question:   Antimicrobial Indications     Answer:   Pneumonia (CAP)    0.9 % sodium chloride bolus    ipratropium-albuterol (DUONEB) nebulizer solution 1 ampule     Order Specific Question:   Initiate RT Bronchodilator Protocol     Answer:   Yes - ED Protocol    sodium chloride flush 0.9 % injection 5-40 mL    sodium chloride flush 0.9 % injection 5-40 mL    0.9 % sodium chloride infusion    OR Linked Order Group     acetaminophen (TYLENOL) tablet 650 mg     acetaminophen (TYLENOL) suppository 650 mg    DISCONTD: cefTRIAXone (ROCEPHIN) 1,000 mg in sodium chloride 0.9 % 50 mL IVPB (mini-bag)     Order Specific Question:   Antimicrobial Indications     Answer:   Pneumonia (CAP)     Order Specific Question:   CAP duration of therapy     Answer: Other     Order Specific Question:   Other CAP Duration     Answer:   6 more days    DISCONTD: azithromycin (ZITHROMAX) 500 mg in sodium chloride 0.9 % 250 mL IVPB ([id number])     Order Specific Question:   Antimicrobial Indications     Answer:   Pneumonia (CAP)     Order Specific Question:   CAP duration of therapy     Answer:    Other     Order Specific Question:   Other CAP Duration     Answer:   6 more days    enoxaparin Sodium (LOVENOX) injection 30 mg     Order Specific Question:   Indication of Use     Answer:   Prophylaxis-DVT/PE    0.9 % sodium chloride bolus    0.9 % sodium chloride infusion    albuterol (PROVENTIL) nebulizer solution 2.5 mg     Order Specific Question:   Initiate RT Bronchodilator Protocol     Answer:   Yes - Inpatient Protocol    tuberculin injection 5 Units    cefepime (MAXIPIME) 2,000 mg in sodium chloride 0.9 % 50 mL IVPB (mini-bag)     Order Specific Question:   Antimicrobial Indications     Answer:   Pneumonia (HAP)     Order Specific Question:   HAP duration of therapy     Answer:   7 days    cefepime (MAXIPIME) 2,000 mg in sodium chloride 0.9 % 50 mL IVPB (mini-bag)     Order Specific Question:   Antimicrobial Indications     Answer:   Pneumonia (HAP)     Order Specific Question:   HAP duration of therapy     Answer:   7 days    vancomycin (VANCOCIN) 2500 mg in sodium chloride 0.9 % 500 mL IVPB     Order Specific Question:   Antimicrobial Indications     Answer:   Pneumonia (HAP)     Order Specific Question:   HAP duration of therapy     Answer:   7 days    DISCONTD: vancomycin (VANCOCIN) 1,000 mg in sodium chloride 0.9 % 250 mL IVPB ([id number])     Order Specific Question:   Antimicrobial Indications     Answer:   Pneumonia (HAP)     Order Specific Question:   HAP duration of therapy     Answer:   7 days    furosemide (LASIX) injection 20 mg    OLANZapine (ZYPREXA) tablet 20 mg    doxepin (SINEQUAN) capsule 100 mg    vancomycin (VANCOCIN) 1750 mg in sodium chloride 0.9 % 500 mL IVPB     Order Specific Question:   Antimicrobial Indications     Answer:   Pneumonia (HAP)     Order Specific Question:   HAP duration of therapy     Answer:   7 days    furosemide (LASIX) injection 20 mg         Signed:  Sara Love MD    Part of this note may have been written by using a voice dictation software.   The note has been proof read but may still contain some grammatical/other typographical errors.

## 2023-01-30 NOTE — PROGRESS NOTES
LTG: patient will tolerate safest, least restrictive oral diet without s/sx airway compromise  STG: Patient will tolerate  easy to chew diet and thin liquids without overt s/sx aspiration (goal revised 23)  STG: Patient will tolerate ongoing po trials in efforts to advance diet  STG: Patient will participate in modified barium swallow study to objectively assess oropharyngeal swallow if indicated    SPEECH LANGUAGE PATHOLOGY: DYSPHAGIA  Daily Note #1    NAME: Ashley Macias  : 1970  MRN: [id number]    ADMISSION DATE: 2023  PRIMARY DIAGNOSIS: Severe sepsis ([address]  Dehydration [E86.0]  Acute respiratory failure with hypoxia (HCC) [J96.01]  Sepsis ([facility] [A41.9]  Acute renal failure, unspecified acute renal failure type ([facility] [N17.9]  Other fatigue [R53.83]  Pneumonia of both lungs due to infectious organism, unspecified part of lung [J18.9]    ICD-10: Treatment Diagnosis: R13.12 Dysphagia, Oropharyngeal Phase    RECOMMENDATIONS   Diet:  Easy to Chew  Thin liquids    Medications:  one at a time with a liquid wash     Compensatory Swallowing Strategies:  slow rate   Patient continues to require skilled intervention: Yes  D/C Recommendations: Ongoing speech therapy is recommended during this hospitalization, To be determined     ASSESSMENT    Attempted po trials of regular textures and thin liquids via consecutive swallows. No overt signs/sx of aspiration. Mild increased time for mastication required and liquid wash to clear trace lingual residue with solids  Recommend upgrade to easy to chew diet/thin liquids with feeding assistance. Will follow for diet tolerance. GENERAL    \"I want to see my mom\". History of Present Injury/Illness: Ms. Moyer Adjutant  has a past medical history of Psychiatric disorder. She also  has a past surgical history that includes gi.      Pain:         No complaints of pain          OBJECTIVE    Oropharyngeal Phase:  Patient was presented with several trials of thin liquids often via serial swallow by straw. Prompt initiation of the swallow with no overt s/sx aspiration and clear vocal quality. Tolerated regular textures with mild increased mastication time and oral prep and trace lingual residue cleared with a liquid wash. Patient fatigues easily stating she would finish the other cracker later. Discussed slowing rate of intake due to high respiratory demands. No signs/sx of pharyngeal stasis. 02 remained in the mid 90s throughout the session. Cough x1 during the session independent of any po trials. Recommend initiate easy to chew consistencies. Continue thin liquids. Tray set up and feeding assistance. PLAN    Duration/Frequency: Continue to follow patient 3x/week for duration of hospitalization and/or until goals met    Dysphagia Outcome and Severity Scale (HENRIQUE)  Dysphagia Outcome Severity Scale: Level 4: Mild moderate dysphagia- Intermittent supervision/cueing. One - two diet consistencies restricted  Interpretation of Tool: The Dysphagia Outcome and Severity Scale (HENRIQUE) is a simple, easy-to-use, 7-point scale developed to systematically rate the functional severity of dysphagia based on objective assessment and make recommendations for diet level, independence level, and type of nutrition. Normal(7), Functional(6), Mild(5), Mild-Moderate(4), Moderate(3), Moderate-Severe(2), Severe(1)    Education:     Diet modifications planned   Safe positioning with po intake    PRECAUTIONS/ALLERGIES: Penicillins      Therapy Time  SLP Individual Minutes  Time In: 3259  Time Out: 6180  Minutes: 21     SLP Total Treatment Time  Total Treatment Time: 24    NEETU Pascal  1/30/2023 11:58 AM

## 2023-01-30 NOTE — PROGRESS NOTES
VANCO DAILY FOLLOW UP NOTE  [address] [facility] Pharmacokinetic Monitoring Service - Vancomycin    Consulting Provider: Dr. Mandeep Edward   Indication: HAP  Target Concentration: Goal AUC/-600 mg*hr/L  Day of Therapy: 4 of 7  Additional Antimicrobials: cefepime    Patient eligible for piperacillin-tazobactam to cefepime auto-substitution per P&T approved protocol? N/A    Pertinent Laboratory Values: Wt Readings from Last 1 Encounters:   01/30/23 291 lb 14.2 oz (132.4 kg)     Temp Readings from Last 1 Encounters:   01/30/23 98.2 °F (36.8 °C) (Axillary)     Recent Labs     01/28/23  0402 01/29/23  0402 01/29/23  0826 01/30/23  0329   BUN 19  --  11 8   CREATININE 0.80 0.60 0.70 0.60   WBC 3.7*  --  4.9  --      Estimated Creatinine Clearance: 150 mL/min (based on SCr of 0.6 mg/dL). Lab Results   Component Value Date/Time    VANCORANDOM 14.2 01/29/2023 04:02 AM     MRSA Nasal Swab: not ordered. Order placed by pharmacy    Assessment:  Date/Time Dose Concentration AUC   1/29 0402 1000 mg q12h 14.2 276   Note: Serum concentrations collected for AUC dosing may appear elevated if collected in close proximity to the dose administered, this is not necessarily an indication of toxicity    Plan:  Continue vancomycin 1750 mg IV q12h for predicted AUC/Tr of 484/10.3  Repeat vancomycin concentration ordered for 1/31 @ 0400   Pharmacy will continue to monitor patient and adjust therapy as indicated    Thank you for the consult,  Claudia Burnett, [facility]

## 2023-01-31 LAB
ANION GAP SERPL CALC-SCNC: 8 MMOL/L (ref 2–11)
BACTERIA SPEC CULT: NORMAL
BACTERIA SPEC CULT: NORMAL
BUN SERPL-MCNC: 8 MG/DL (ref 6–23)
CALCIUM SERPL-MCNC: 8.5 MG/DL (ref 8.3–10.4)
CHLORIDE SERPL-SCNC: 105 MMOL/L (ref 101–110)
CO2 SERPL-SCNC: 26 MMOL/L (ref 21–32)
CREAT SERPL-MCNC: 0.6 MG/DL (ref 0.6–1)
GLUCOSE SERPL-MCNC: 94 MG/DL (ref 65–100)
POTASSIUM SERPL-SCNC: 5.4 MMOL/L (ref 3.5–5.1)
SERVICE CMNT-IMP: NORMAL
SERVICE CMNT-IMP: NORMAL
SODIUM SERPL-SCNC: 139 MMOL/L (ref 133–143)
VANCOMYCIN SERPL-MCNC: 27.9 UG/ML

## 2023-01-31 PROCEDURE — 92526 ORAL FUNCTION THERAPY: CPT

## 2023-01-31 PROCEDURE — 2580000003 HC RX 258: Performed by: FAMILY MEDICINE

## 2023-01-31 PROCEDURE — 2580000003 HC RX 258: Performed by: INTERNAL MEDICINE

## 2023-01-31 PROCEDURE — 80048 BASIC METABOLIC PNL TOTAL CA: CPT

## 2023-01-31 PROCEDURE — 6360000002 HC RX W HCPCS: Performed by: INTERNAL MEDICINE

## 2023-01-31 PROCEDURE — 6370000000 HC RX 637 (ALT 250 FOR IP): Performed by: INTERNAL MEDICINE

## 2023-01-31 PROCEDURE — 36415 COLL VENOUS BLD VENIPUNCTURE: CPT

## 2023-01-31 PROCEDURE — 80202 ASSAY OF VANCOMYCIN: CPT

## 2023-01-31 PROCEDURE — 94640 AIRWAY INHALATION TREATMENT: CPT

## 2023-01-31 PROCEDURE — 2700000000 HC OXYGEN THERAPY PER DAY

## 2023-01-31 PROCEDURE — 1100000000 HC RM PRIVATE

## 2023-01-31 PROCEDURE — 94761 N-INVAS EAR/PLS OXIMETRY MLT: CPT

## 2023-01-31 PROCEDURE — 6360000002 HC RX W HCPCS: Performed by: FAMILY MEDICINE

## 2023-01-31 RX ADMIN — IPRATROPIUM BROMIDE AND ALBUTEROL SULFATE 1 AMPULE: 2.5; .5 SOLUTION RESPIRATORY (INHALATION) at 11:41

## 2023-01-31 RX ADMIN — SODIUM CHLORIDE, PRESERVATIVE FREE 10 ML: 5 INJECTION INTRAVENOUS at 08:47

## 2023-01-31 RX ADMIN — ENOXAPARIN SODIUM 30 MG: 100 INJECTION SUBCUTANEOUS at 21:11

## 2023-01-31 RX ADMIN — VANCOMYCIN HYDROCHLORIDE 1000 MG: 1 INJECTION, POWDER, LYOPHILIZED, FOR SOLUTION INTRAVENOUS at 21:10

## 2023-01-31 RX ADMIN — SODIUM CHLORIDE, PRESERVATIVE FREE 5 ML: 5 INJECTION INTRAVENOUS at 21:11

## 2023-01-31 RX ADMIN — OLANZAPINE 20 MG: 5 TABLET, FILM COATED ORAL at 21:11

## 2023-01-31 RX ADMIN — CEFEPIME 2000 MG: 2 INJECTION, POWDER, FOR SOLUTION INTRAVENOUS at 04:32

## 2023-01-31 RX ADMIN — CEFEPIME 2000 MG: 2 INJECTION, POWDER, FOR SOLUTION INTRAVENOUS at 12:54

## 2023-01-31 RX ADMIN — BUDESONIDE 500 MCG: 0.5 INHALANT RESPIRATORY (INHALATION) at 19:55

## 2023-01-31 RX ADMIN — BUDESONIDE 500 MCG: 0.5 INHALANT RESPIRATORY (INHALATION) at 08:33

## 2023-01-31 RX ADMIN — IPRATROPIUM BROMIDE AND ALBUTEROL SULFATE 1 AMPULE: 2.5; .5 SOLUTION RESPIRATORY (INHALATION) at 19:55

## 2023-01-31 RX ADMIN — IPRATROPIUM BROMIDE AND ALBUTEROL SULFATE 1 AMPULE: 2.5; .5 SOLUTION RESPIRATORY (INHALATION) at 08:33

## 2023-01-31 RX ADMIN — CEFEPIME 2000 MG: 2 INJECTION, POWDER, FOR SOLUTION INTRAVENOUS at 21:10

## 2023-01-31 RX ADMIN — ENOXAPARIN SODIUM 30 MG: 100 INJECTION SUBCUTANEOUS at 08:47

## 2023-01-31 RX ADMIN — DOXEPIN HYDROCHLORIDE 100 MG: 50 CAPSULE ORAL at 21:11

## 2023-01-31 RX ADMIN — IPRATROPIUM BROMIDE AND ALBUTEROL SULFATE 1 AMPULE: 2.5; .5 SOLUTION RESPIRATORY (INHALATION) at 15:51

## 2023-01-31 NOTE — PROGRESS NOTES
Upon leaving the patient's room there is no distress noted. SP02 is 93%.         01/31/23 1151   Oxygen Therapy/Pulse Ox   O2 Therapy Oxygen humidified   O2 Device High flow nasal cannula   O2 Flow Rate (L/min) 5 L/min  (weaned the patient to 5L HFNC at this time.)   Heart Rate (!) 107   Resp 18   SpO2 93 %

## 2023-01-31 NOTE — PROGRESS NOTES
LTG: patient will tolerate safest, least restrictive oral diet without s/sx airway compromise  STG: Patient will tolerate  easy to chew diet and thin liquids without overt s/sx aspiration (goal met 23)  STG: Patient will tolerate ongoing po trials in efforts to advance diet  STG: Patient will participate in modified barium swallow study to objectively assess oropharyngeal swallow if indicated    SPEECH LANGUAGE PATHOLOGY: DYSPHAGIA  Discharge    NAME: Gail Bustamante  : 1970  MRN: [id number]    ADMISSION DATE: 2023  PRIMARY DIAGNOSIS: Severe sepsis ([facility] [address] [address]  Dehydration [E86.0]  Acute respiratory failure with hypoxia (HCC) [J96.01]  Sepsis ([facility] [address] [A41.9]  Acute renal failure, unspecified acute renal failure type ([facility] [address] [facility] [N17.9]  Other fatigue [R53.83]  Pneumonia of both lungs due to infectious organism, unspecified part of lung [J18.9]    ICD-10: Treatment Diagnosis: R13.12 Dysphagia, Oropharyngeal Phase    RECOMMENDATIONS   Diet:  Easy to Chew  Thin liquids    Medications:  one at a time with a liquid wash     Compensatory Swallowing Strategies:  slow rate   Patient continues to require skilled intervention: Yes  D/C Recommendations: Ongoing speech therapy is recommended during this hospitalization, To be determined     ASSESSMENT    Patient was observed with lunch. Decreased respiratory demands since last seen on 5L nasal cannula. Tray set up assistance provided but patient able to self feed. Tolerated trials of easy to chew consistencies with only minimal increased mastication time. No overt signs/sx of aspiration with thin liquid trials or ETC consistencies. Remains impulsive with consistent cues to reduce rate/quantity. Therefore, recommend continue easy to chew consistencies/thin liquids. No further ST indicated at this time. GENERAL    Perseverating on tattoos throughout the session. History of Present Injury/Illness: Ms. Gab Caceres  has a past medical history of Psychiatric disorder.  She also  has a past surgical history that includes gi. Pain:         No complaints of pain          OBJECTIVE    Oropharyngeal Phase:  Patient was presented with several trials of thin liquids often via serial swallow by straw. Prompt initiation of the swallow with no overt s/sx aspiration and clear vocal quality. Tolerated easy to chew consistencies with mild increased mastication time. Discussed slowing rate of intake to improve breath/swallow coordination. No signs/sx of pharyngeal stasis with po trials. Recommend continue initiate easy to chew consistencies. Continue thin liquids. Tray set up assistance. PLAN    Duration/Frequency: Continue to follow patient 3x/week for duration of hospitalization and/or until goals met    Dysphagia Outcome and Severity Scale (HENRIQUE)  Dysphagia Outcome Severity Scale: Level 4: Mild moderate dysphagia- Intermittent supervision/cueing. One - two diet consistencies restricted  Interpretation of Tool: The Dysphagia Outcome and Severity Scale (HENRIQUE) is a simple, easy-to-use, 7-point scale developed to systematically rate the functional severity of dysphagia based on objective assessment and make recommendations for diet level, independence level, and type of nutrition. Normal(7), Functional(6), Mild(5), Mild-Moderate(4), Moderate(3), Moderate-Severe(2), Severe(1)    Education:     Diet modifications planned   Safe positioning with po intake    PRECAUTIONS/ALLERGIES: Penicillins      Therapy Time  SLP Individual Minutes  Time In: 1250  Time Out: 1309  Minutes: 23     SLP Total Treatment Time  Total Treatment Time: [facility].  NEETU Harris  1/31/2023 1:12 PM

## 2023-01-31 NOTE — PROGRESS NOTES
VANCO DAILY FOLLOW UP NOTE  [address] [facility] Pharmacokinetic Monitoring Service - Vancomycin    Consulting Provider: Dr. Isidro Harris   Indication: HAP  Target Concentration: Goal AUC/-600 mg*hr/L  Day of Therapy: 5 of 7  Additional Antimicrobials: cefepime    Patient eligible for piperacillin-tazobactam to cefepime auto-substitution per P&T approved protocol? N/A    Pertinent Laboratory Values: Wt Readings from Last 1 Encounters:   01/31/23 293 lb 10.4 oz (133.2 kg)     Temp Readings from Last 1 Encounters:   01/31/23 98.6 °F (37 °C) (Axillary)     Recent Labs     01/29/23  0826 01/30/23  0329 01/31/23  0424   BUN 11 8 8   CREATININE 0.70 0.60 0.60   WBC 4.9  --   --      Estimated Creatinine Clearance: 150 mL/min (based on SCr of 0.6 mg/dL). Lab Results   Component Value Date/Time    VANCORANDOM 27.9 01/31/2023 04:24 AM     MRSA Nasal Swab: not ordered. Order placed by pharmacy    Assessment:  Date/Time Dose Concentration AUC   1/29 0402 1000 mg q12h 14.2 276   1/31 0424 1750 mg q12h 27.9 826   Note: Serum concentrations collected for AUC dosing may appear elevated if collected in close proximity to the dose administered, this is not necessarily an indication of toxicity    Plan:  Current dosing regimen is supra-therapeutic  Decrease dose to 1000 mg IV q12h for predicted AUC/Tr of 521/19.2  Repeat vancomycin concentration ordered for 2/1 @ 0400    Pharmacy will continue to monitor patient and adjust therapy as indicated    Thank you for the consult,  Dariel Smiley, [facility]

## 2023-01-31 NOTE — PROGRESS NOTES
Received report from Su He, [facility]. Patient awake and in bed. A&O x3. Respirations present. On airvo 50L 50% with O2 sat at 96%. PW in place and draining. No signs of distress. No needs expressed. Bed low and locked. Call light within reach. Will continue to monitor.

## 2023-01-31 NOTE — PROGRESS NOTES
Patient resting quietly in bed. Respirations even and unlabored. On airvo 50L 50% with O2 sat at 96%. PW in place and draining. Cefepime infusing. No signs of distress. No needs expressed. To give report to oncoming RN.

## 2023-01-31 NOTE — PROGRESS NOTES
Weaned the patient to 40L, 40% on Airvo at this time. Upon leaving the patient no distress is noted. Sp02 is 93%. 01/31/23 0835   Treatment   Treatment Type Aerosol generator   $Treatment Type $Inhaled Therapy/Meds   Medications Budesonide   Pre-Tx Pulse 86   Pre-Tx Resps 17   Breath Sounds Pre-Tx MERCEDES Diminished   Breath Sounds Pre-Tx LLL Diminished   Breath Sounds Pre-Tx RUL Diminished   Breath Sounds Pre-Tx RML Diminished   Breath Sounds Pre-Tx RLL Diminished   Breath Sounds Post-Tx MERCEDES Diminished   Breath Sounds Post-Tx LLL Diminished   Breath Sounds Post-Tx RUL Diminished   Breath Sounds Post-Tx RML Diminished   Breath Sounds Post-Tx RLL Diminished   Post-Tx Pulse 94   Post-Tx Resps 18   Delivery Source Mask   Position Semi-Ocampo's   Treatment Tolerance Well   Duration 10   Is patient on O2?  Y   Oxygen Therapy/Pulse Ox   O2 Therapy Oxygen humidified   $Oxygen $Daily Charge   O2 Device Heated high flow cannula   O2 Flow Rate (L/min) 40 L/min   FiO2  40 %   Heart Rate 86   Resp 17   SpO2 93 %   $Pulse Oximeter $Spot check (multiple/continuous)

## 2023-01-31 NOTE — PROGRESS NOTES
Hospitalist Progress Note   Admit Date:  2023 12:56 PM   Name:  Gricelda Bingham   Age:  45 y.o. Sex:  female  :  1970   MRN:  [id number]   Room:  [facility]/    Presenting Complaint: Fatigue     Reason(s) for Admission: Dehydration [E86.0]  Acute respiratory failure with hypoxia ([address] [J96.01]  Sepsis ([address] [A41.9]  Acute renal failure, unspecified acute renal failure type ([address] [N17.9]  Other fatigue [R53.83]  Pneumonia of both lungs due to infectious organism, unspecified part of lung [J18.9]     History of Present Illness:   Gricelda Bingham is a 45 y.o. female with a h/o schizophrenia and morbid obesity who presents today with complaints of weakness. Her mom is at bedside who provides the history. Patient developed a cough about 3 days ago. She had a fall yesterday and today seemed weaker and more confused. Oral intake has been reduced. Subjective fever noted today. Otherwise no chest pain, headaches, syncope, palpitations, N/V/D, abdo pain. Patient's mom, Marin Flores, had a cough and pneumonia recently and was treated with antibiotics, though never told she had COVID or influenza. VS noted tachycardia low 100s, BP improved after IVFs. Labs with mild leukopenia, sCr 2.4 (normal baseline), procal 0.13, normal LA. ABG showed pH 7.42, CO2 34. CXR with bilateral consolidations (L>R). She was given ceftriaxone via EMS and azithromycin was added in the ER. She has been given IVFs and BP improved. Rapid COVID is negative. We are consulted for admission and further management. 2023  Patient seen and evaluated.   Transition to [facility] 2023  Chest x-ray done 2023 evening showed worsening bilateral infiltrates consistent with volume overload  She was started on vancomycin and Zosyn by the on-call  She has had 2 doses of IV Lasix  Wheezing on exam but improved  She denies cp,n/v or any other pain  Afebrile overnight    Assessment & Plan:   # Severe sepsis and acute hypoxemic respiratory failure 2/2 CAP vs viral pneumonia   - Leukopenia + tachycardia + infiltrates on CXR + EDU and encephalopathy (end organ damage). RA O2 sat 80% with EMS. Currently on 15L NRB with sats high 90s, so can wean that now. - Given IVFs and ceftriaxone with EMS, azithromycin given here in addition to another 1L bolus. Will add 1 more liter bolus followed by MIVFs. Rapid COVID neg, will check RVP. Con't with ceftriaxone and azithromycin. Monitor urine output. 1/31/2023  Continue antibiotics-   Follow-up cultures  Cleared by speech for diet. Currently remains on on Airvo -attempting to wean  Rpt cxr showed worsening infiltrates  She was started on vancomycin and Zosyn-switched to Vanco and cefepime per hospital protocol  She is status post Lasix 20mg iv x 2 with appropriate output  We will ask respiratory to attempt to wean off Airvo  Continue DuoNebs     # Acute septic/metabolic encephalopathy   - ABG showed normal pH without hypocapnea   - Will hold schizophrenia medications today to avoid further sedation. 1/31/2023  Continue Zyprexa and and doxepin      # EDU   - Likely sepsis and pre-renal. Normal baseline. Recheck tomorrow. Monitor urine output. Further work up if worsens or no improvement. 1/31/2023  Resolved      # Schizophrenia   - Managed by Dr. Joesph [facility] at [facility] (dx at age 13)   - Holding meds as above. Resume as soon as possible pending improvement in mentation. 1/31/2023  Continue Zyprexa 20 mg p.o. daily nightly  Continue doxepin AKA Sinequan 100 mg p.o. nightly  Consider reinstating Lexapro 20 mg daily and Cymbalta 60 mg extended release daily as needed    # Morbid obesity   - Complicates care. Anticipated discharge needs: TBD. She lives at home with family     diet: Continue NPO with sips/ice until seen by speech  VTE ppx: Lovenox BID  Code status: Discussed with mom at bedside, patient is full code. POA is mom Juventino Amanda [phone].         Hospital Problems:  Principal Problem:    Severe sepsis (HCC)  Active Problems:    EDU (acute kidney injury) ([facility]    Schizophrenia ([facility]    Acute metabolic encephalopathy    Acute hypoxemic respiratory failure (HCC)    Morbid obesity ([facility]  Resolved Problems:    * No resolved hospital problems. *     Objective:   Patient Vitals for the past 24 hrs:   Temp Pulse Resp BP SpO2   01/31/23 1551 -- (!) 103 19 -- 93 %   01/31/23 1506 98.2 °F (36.8 °C) (!) 101 18 (!) 133/90 92 %   01/31/23 1151 -- (!) 107 18 -- 93 %   01/31/23 1142 -- (!) 110 17 -- 93 %   01/31/23 1033 98.1 °F (36.7 °C) (!) 105 18 (!) 145/84 95 %   01/31/23 0835 -- 86 17 -- 93 %   01/31/23 0743 98.6 °F (37 °C) (!) 106 18 (!) 138/95 97 %   01/31/23 0413 97.7 °F (36.5 °C) 99 19 (!) 143/92 98 %   01/31/23 0312 -- -- -- -- 95 %   01/30/23 2325 97.9 °F (36.6 °C) 99 19 (!) 143/95 95 %   01/30/23 2017 97.9 °F (36.6 °C) (!) 104 19 (!) 142/99 98 %   01/30/23 1952 -- (!) 103 18 -- 96 %   01/30/23 1920 -- -- -- -- 96 %         Oxygen Therapy  SpO2: 93 %  Pulse Oximeter Device Mode: Continuous  Pulse Oximeter Device Location: Finger  O2 Device: High flow nasal cannula  Skin Assessment: Clean, dry, & intact  FiO2 : 40 %  O2 Flow Rate (L/min): 5 L/min    Estimated body mass index is 47.4 kg/m² as calculated from the following:    Height as of this encounter: 5' 6\" (1.676 m). Weight as of this encounter: 293 lb 10.4 oz (133.2 kg). Intake/Output Summary (Last 24 hours) at 1/31/2023 1707  Last data filed at 1/31/2023 0854  Gross per 24 hour   Intake 0 ml   Output 1150 ml   Net -1150 ml           Physical Exam:  Blood pressure (!) 133/90, pulse (!) 103, temperature 98.2 °F (36.8 °C), temperature source Oral, resp. rate 19, height 5' 6\" (1.676 m), weight 293 lb 10.4 oz (133.2 kg), SpO2 93 %. General:    Morbidly obese. Ill appearing. Head:  Normocephalic, atraumatic. Eyes:  Sclerae appear normal.  Pupils equally round. ENT:  Nares appear normal.  Dry oral mucosa. Neck:  No restricted ROM. Trachea midline   CV:   RRR. No m/r/g. No jugular venous distension. Lungs:   Bilateral rales, no wheezes or rhonchi. 15L NRB, bedside sats high 90s. Abdomen:   Soft, nontender, nondistended. Extremities: No cyanosis or clubbing. No edema. Skin:     No rashes and normal coloration. Warm and dry. Neuro:  Unable to assess. Lethargic but opens eyes and groans to voice and painful stimuli. Pupils are equal.  Psych:  Unable to fully assess. I have personally reviewed labs and tests:  Recent Labs:  Recent Results (from the past 24 hour(s))   Basic Metabolic Panel w/ Reflex to MG    Collection Time: 01/31/23  4:24 AM   Result Value Ref Range    Sodium 139 133 - 143 mmol/L    Potassium 5.4 (H) 3.5 - 5.1 mmol/L    Chloride 105 101 - 110 mmol/L    CO2 26 21 - 32 mmol/L    Anion Gap 8 2 - 11 mmol/L    Glucose 94 65 - 100 mg/dL    BUN 8 6 - 23 MG/DL    Creatinine 0.60 0.6 - 1.0 MG/DL    Est, Glom Filt Rate >60 >60 ml/min/1.73m2    Calcium 8.5 8.3 - 10.4 MG/DL   Vancomycin Level, Random    Collection Time: 01/31/23  4:24 AM   Result Value Ref Range    Vancomycin Rm 27.9 UG/ML       I have personally reviewed imaging studies:  XR CHEST PORTABLE    Result Date: 1/26/2023  PORTABLE CHEST 1 VIEW HISTORY: Shortness of breath COMPARISON: 4/17/2021 FINDINGS: Consolidation is present throughout the left lung and in the right upper lobe. EKG leads are present. Lung volumes are diminished. Bilateral consolidation. Echocardiogram:  No results found for this or any previous visit.         Orders Placed This Encounter   Medications    azithromycin (ZITHROMAX) 500 mg in sodium chloride 0.9 % 250 mL IVPB ([id number])     Order Specific Question:   Antimicrobial Indications     Answer:   Pneumonia (CAP)    0.9 % sodium chloride bolus    ipratropium-albuterol (DUONEB) nebulizer solution 1 ampule     Order Specific Question:   Initiate RT Bronchodilator Protocol     Answer:   Yes - ED Protocol    sodium chloride flush 0.9 % injection 5-40 mL    sodium chloride flush 0.9 % injection 5-40 mL    0.9 % sodium chloride infusion    OR Linked Order Group     acetaminophen (TYLENOL) tablet 650 mg     acetaminophen (TYLENOL) suppository 650 mg    DISCONTD: cefTRIAXone (ROCEPHIN) 1,000 mg in sodium chloride 0.9 % 50 mL IVPB (mini-bag)     Order Specific Question:   Antimicrobial Indications     Answer:   Pneumonia (CAP)     Order Specific Question:   CAP duration of therapy     Answer: Other     Order Specific Question:   Other CAP Duration     Answer:   6 more days    DISCONTD: azithromycin (ZITHROMAX) 500 mg in sodium chloride 0.9 % 250 mL IVPB ([id number])     Order Specific Question:   Antimicrobial Indications     Answer:   Pneumonia (CAP)     Order Specific Question:   CAP duration of therapy     Answer:    Other     Order Specific Question:   Other CAP Duration     Answer:   6 more days    enoxaparin Sodium (LOVENOX) injection 30 mg     Order Specific Question:   Indication of Use     Answer:   Prophylaxis-DVT/PE    0.9 % sodium chloride bolus    DISCONTD: 0.9 % sodium chloride infusion    albuterol (PROVENTIL) nebulizer solution 2.5 mg     Order Specific Question:   Initiate RT Bronchodilator Protocol     Answer:   Yes - Inpatient Protocol    tuberculin injection 5 Units    cefepime (MAXIPIME) 2,000 mg in sodium chloride 0.9 % 50 mL IVPB (mini-bag)     Order Specific Question:   Antimicrobial Indications     Answer:   Pneumonia (HAP)     Order Specific Question:   HAP duration of therapy     Answer:   7 days    cefepime (MAXIPIME) 2,000 mg in sodium chloride 0.9 % 50 mL IVPB (mini-bag)     Order Specific Question:   Antimicrobial Indications     Answer:   Pneumonia (HAP)     Order Specific Question:   HAP duration of therapy     Answer:   7 days    vancomycin (VANCOCIN) 2500 mg in sodium chloride 0.9 % 500 mL IVPB     Order Specific Question:   Antimicrobial Indications     Answer:   Pneumonia (HAP)     Order Specific Question:   HAP duration of therapy     Answer:   7 days    DISCONTD: vancomycin (VANCOCIN) 1,000 mg in sodium chloride 0.9 % 250 mL IVPB ([id number])     Order Specific Question:   Antimicrobial Indications     Answer:   Pneumonia (HAP)     Order Specific Question:   HAP duration of therapy     Answer:   7 days    furosemide (LASIX) injection 20 mg    OLANZapine (ZYPREXA) tablet 20 mg    doxepin (SINEQUAN) capsule 100 mg    DISCONTD: vancomycin (VANCOCIN) 1750 mg in sodium chloride 0.9 % 500 mL IVPB     Order Specific Question:   Antimicrobial Indications     Answer:   Pneumonia (HAP)     Order Specific Question:   HAP duration of therapy     Answer:   7 days    furosemide (LASIX) injection 20 mg    ipratropium-albuterol (DUONEB) nebulizer solution 1 ampule     Order Specific Question:   Initiate RT Bronchodilator Protocol     Answer:   No    budesonide (PULMICORT) nebulizer suspension 500 mcg    vancomycin (VANCOCIN) 1,000 mg in sodium chloride 0.9 % 250 mL IVPB ([id number])     Order Specific Question:   Antimicrobial Indications     Answer:   Pneumonia (HAP)     Order Specific Question:   HAP duration of therapy     Answer:   7 days         Signed:  Tim Sánchez MD    Part of this note may have been written by using a voice dictation software. The note has been proof read but may still contain some grammatical/other typographical errors.

## 2023-02-01 LAB
MM INDURATION, POC: 0 MM (ref 0–5)
PPD, POC: NEGATIVE
VANCOMYCIN SERPL-MCNC: 15.7 UG/ML

## 2023-02-01 PROCEDURE — 97161 PT EVAL LOW COMPLEX 20 MIN: CPT

## 2023-02-01 PROCEDURE — 2700000000 HC OXYGEN THERAPY PER DAY

## 2023-02-01 PROCEDURE — 1100000000 HC RM PRIVATE

## 2023-02-01 PROCEDURE — 2580000003 HC RX 258: Performed by: FAMILY MEDICINE

## 2023-02-01 PROCEDURE — 97530 THERAPEUTIC ACTIVITIES: CPT

## 2023-02-01 PROCEDURE — 6360000002 HC RX W HCPCS: Performed by: INTERNAL MEDICINE

## 2023-02-01 PROCEDURE — 80202 ASSAY OF VANCOMYCIN: CPT

## 2023-02-01 PROCEDURE — 6360000002 HC RX W HCPCS: Performed by: FAMILY MEDICINE

## 2023-02-01 PROCEDURE — 36415 COLL VENOUS BLD VENIPUNCTURE: CPT

## 2023-02-01 PROCEDURE — 6370000000 HC RX 637 (ALT 250 FOR IP): Performed by: INTERNAL MEDICINE

## 2023-02-01 PROCEDURE — 94640 AIRWAY INHALATION TREATMENT: CPT

## 2023-02-01 PROCEDURE — 2580000003 HC RX 258: Performed by: INTERNAL MEDICINE

## 2023-02-01 PROCEDURE — 94761 N-INVAS EAR/PLS OXIMETRY MLT: CPT

## 2023-02-01 RX ORDER — VALPROIC ACID 250 MG/1
1000 CAPSULE, LIQUID FILLED ORAL
Status: DISCONTINUED | OUTPATIENT
Start: 2023-02-01 | End: 2023-02-06 | Stop reason: HOSPADM

## 2023-02-01 RX ORDER — ESCITALOPRAM OXALATE 10 MG/1
20 TABLET ORAL DAILY
Status: DISCONTINUED | OUTPATIENT
Start: 2023-02-01 | End: 2023-02-06 | Stop reason: HOSPADM

## 2023-02-01 RX ADMIN — IPRATROPIUM BROMIDE AND ALBUTEROL SULFATE 1 AMPULE: 2.5; .5 SOLUTION RESPIRATORY (INHALATION) at 08:29

## 2023-02-01 RX ADMIN — SODIUM CHLORIDE, PRESERVATIVE FREE 10 ML: 5 INJECTION INTRAVENOUS at 08:56

## 2023-02-01 RX ADMIN — VANCOMYCIN HYDROCHLORIDE 1250 MG: 10 INJECTION, POWDER, LYOPHILIZED, FOR SOLUTION INTRAVENOUS at 08:56

## 2023-02-01 RX ADMIN — IPRATROPIUM BROMIDE AND ALBUTEROL SULFATE 1 AMPULE: 2.5; .5 SOLUTION RESPIRATORY (INHALATION) at 19:56

## 2023-02-01 RX ADMIN — DOXEPIN HYDROCHLORIDE 100 MG: 50 CAPSULE ORAL at 22:01

## 2023-02-01 RX ADMIN — SODIUM CHLORIDE, PRESERVATIVE FREE 10 ML: 5 INJECTION INTRAVENOUS at 22:05

## 2023-02-01 RX ADMIN — IPRATROPIUM BROMIDE AND ALBUTEROL SULFATE 1 AMPULE: 2.5; .5 SOLUTION RESPIRATORY (INHALATION) at 11:28

## 2023-02-01 RX ADMIN — CEFEPIME 2000 MG: 2 INJECTION, POWDER, FOR SOLUTION INTRAVENOUS at 13:31

## 2023-02-01 RX ADMIN — CEFEPIME 2000 MG: 2 INJECTION, POWDER, FOR SOLUTION INTRAVENOUS at 04:40

## 2023-02-01 RX ADMIN — ENOXAPARIN SODIUM 30 MG: 100 INJECTION SUBCUTANEOUS at 08:49

## 2023-02-01 RX ADMIN — BUDESONIDE 500 MCG: 0.5 INHALANT RESPIRATORY (INHALATION) at 08:29

## 2023-02-01 RX ADMIN — BUDESONIDE 500 MCG: 0.5 INHALANT RESPIRATORY (INHALATION) at 19:56

## 2023-02-01 RX ADMIN — ESCITALOPRAM OXALATE 20 MG: 10 TABLET, FILM COATED ORAL at 14:01

## 2023-02-01 RX ADMIN — ENOXAPARIN SODIUM 30 MG: 100 INJECTION SUBCUTANEOUS at 22:00

## 2023-02-01 RX ADMIN — OLANZAPINE 20 MG: 5 TABLET, FILM COATED ORAL at 22:01

## 2023-02-01 RX ADMIN — VANCOMYCIN HYDROCHLORIDE 1250 MG: 10 INJECTION, POWDER, LYOPHILIZED, FOR SOLUTION INTRAVENOUS at 22:24

## 2023-02-01 RX ADMIN — IPRATROPIUM BROMIDE AND ALBUTEROL SULFATE 1 AMPULE: 2.5; .5 SOLUTION RESPIRATORY (INHALATION) at 15:15

## 2023-02-01 RX ADMIN — CEFEPIME 2000 MG: 2 INJECTION, POWDER, FOR SOLUTION INTRAVENOUS at 22:05

## 2023-02-01 NOTE — PROGRESS NOTES
Patient resting quietly in bed. Respirations even and unlabored. On 5L HF NC. No signs of distress. No needs expressed. Report given to [facility], RN.

## 2023-02-01 NOTE — PROGRESS NOTES
VANCO DAILY FOLLOW UP NOTE  [facility] Pharmacokinetic Monitoring Service - Vancomycin    Consulting Provider: Dr. Gabriella Marie   Indication: HAP  Target Concentration: Goal AUC/-600 mg*hr/L  Day of Therapy: 6 of 7  Additional Antimicrobials: cefepime    Patient eligible for piperacillin-tazobactam to cefepime auto-substitution per P&T approved protocol? N/A    Pertinent Laboratory Values: Wt Readings from Last 1 Encounters:   02/01/23 293 lb 3.4 oz (133 kg)     Temp Readings from Last 1 Encounters:   02/01/23 97.7 °F (36.5 °C) (Oral)     Recent Labs     01/29/23  0826 01/30/23  0329 01/31/23  0424   BUN 11 8 8   CREATININE 0.70 0.60 0.60   WBC 4.9  --   --      Estimated Creatinine Clearance: 150 mL/min (based on SCr of 0.6 mg/dL). Lab Results   Component Value Date/Time    VANCORANDOM 15.7 02/01/2023 04:01 AM     MRSA Nasal Swab: not ordered. Order placed by pharmacy    Assessment:  Date/Time Dose Concentration AUC   1/29 0402 1000 mg q12h 14.2 276   1/31 0424 1750 mg q12h 27.9 826   2/1 0401 1000 mg q12h 15.7 385   Note: Serum concentrations collected for AUC dosing may appear elevated if collected in close proximity to the dose administered, this is not necessarily an indication of toxicity    Plan:  Current dosing regimen is sub-therapeutic  Increase dose to 1250 mg IV q12h for predicted AUC/Tr of 477/15.9  Repeat vancomycin concentrations will be ordered as clinically appropriate   Pharmacy will continue to monitor patient and adjust therapy as indicated    Thank you for the consult,  Claudia Chapman, [address]

## 2023-02-01 NOTE — PROGRESS NOTES
Received report from [facility]. Patient awake and in bed. A&O x3. Respirations present. On 5L HF NC. PW in place and draining. No signs of distress. No needs expressed. Bed low and locked. Call light within reach. Will continue to monitor.

## 2023-02-01 NOTE — PROGRESS NOTES
Hospitalist Progress Note   Admit Date:  2023 12:56 PM   Name:  Xiomara Gaxiola   Age:  45 y.o. Sex:  female  :  1970   MRN:  [id number]   Room:  [facility]/    Presenting Complaint: Fatigue     Reason(s) for Admission: Dehydration [E86.0]  Acute respiratory failure with hypoxia ([address] [J96.01]  Sepsis ([address] [A41.9]  Acute renal failure, unspecified acute renal failure type ([address] [N17.9]  Other fatigue [R53.83]  Pneumonia of both lungs due to infectious organism, unspecified part of lung [J18.9]     History of Present Illness:   Xiomara Gaxiola is a 45 y.o. female with a h/o schizophrenia and morbid obesity who presents today with complaints of weakness. Her mom is at bedside who provides the history. Patient developed a cough about 3 days ago. She had a fall yesterday and today seemed weaker and more confused. Oral intake has been reduced. Subjective fever noted today. Otherwise no chest pain, headaches, syncope, palpitations, N/V/D, abdo pain. Patient's mom, Elian Thapa, had a cough and pneumonia recently and was treated with antibiotics, though never told she had COVID or influenza. VS noted tachycardia low 100s, BP improved after IVFs. Labs with mild leukopenia, sCr 2.4 (normal baseline), procal 0.13, normal LA. ABG showed pH 7.42, CO2 34. CXR with bilateral consolidations (L>R). She was given ceftriaxone via EMS and azithromycin was added in the ER. She has been given IVFs and BP improved. Rapid COVID is negative. We are consulted for admission and further management. 2023  Patient seen and evaluated.   Transitioned to [facility] 2023  Chest x-ray done 2023 evening showed worsening bilateral infiltrates consistent with volume overload  She was started on vancomycin and Zosyn by the on-call  She has had 2 doses of IV Lasix  And was started on duo nebs  Wheezing has improved  She is now on oxygen via nasal cannula at 5 L  She denies cp,n/v or any other pain or new complaints  Her dad visited her yesterday  Afebrile overnight    Assessment & Plan:   # Severe sepsis and acute hypoxemic respiratory failure 2/2 CAP vs viral pneumonia   - Leukopenia + tachycardia + infiltrates on CXR + EDU and encephalopathy (end organ damage). RA O2 sat 80% with EMS. Currently on 15L NRB with sats high 90s, so can wean that now. - Given IVFs and ceftriaxone with EMS, azithromycin given here in addition to another 1L bolus. Will add 1 more liter bolus followed by MIVFs. Rapid COVID neg, will check RVP. Con't with ceftriaxone and azithromycin. Monitor urine output. 2/1/2023  Continue antibiotics-   Follow-up cultures  Cleared by speech for diet. Now weaned to oxygen 5 L via nasal cannula-we will continue to wean as tolerated  Rpt cxr previously done showed worsening infiltrates  She is on Vanco and cefepime   she is status post Lasix 20mg iv x 2 with appropriate output    # Acute septic/metabolic encephalopathy   - ABG showed normal pH without hypocapnea   - Will hold schizophrenia medications today to avoid further sedation. 2/1/2023  Resolved   continue Zyprexa and and doxepin  We will reinstate Lexapro and Depakene  Hold Cymbalta for now  Can reintroduce based on how she tolerates    # EDU   - Likely sepsis and pre-renal. Normal baseline. Recheck tomorrow. Monitor urine output. Further work up if worsens or no improvement. 2/1/2023  Remains resolved      # Schizophrenia   - Managed by Dr. Levar Whitaker at [facility] (dx at age 13)   - Holding meds as above. Resume as soon as possible pending improvement in mentation. 2/1/2023  Continue Zyprexa 20 mg p.o. daily nightly  Continue doxepin AKA Sinequan 100 mg p.o. nightly  Reinstate Lexapro 20 mg daily and and Depakene  Hold Cymbalta 60 mg extended release daily as needed and reinstate based on how she tolerates the reinstatement of the other above meds    # Morbid obesity   - Complicates care.          Anticipated discharge needs: Anticipate home in the next 48 to 72 hours. She lives at home with family     diet: Continue easy to chew diet   VTE ppx: Lovenox BID  Code status: Discussed with mom over the phone, patient is full code. POA is mom, Juventino Patel [phone]. Mother has the flu as well and so she cannot come into the hospital.  Melisa Holter her anticipated discharge is Friday if all goes well. She wanted to make sure we got the patient up out of bed which happened today and she is recommended for home health therapy. Hospital Problems:  Principal Problem:    Severe sepsis ([address]  Active Problems:    EDU (acute kidney injury) ([address]    Schizophrenia ([address]    Acute metabolic encephalopathy    Acute hypoxemic respiratory failure ([address]    Morbid obesity ([address]  Resolved Problems:    * No resolved hospital problems. *     Objective:   Patient Vitals for the past 24 hrs:   Temp Pulse Resp BP SpO2   02/01/23 0351 97.7 °F (36.5 °C) (!) 103 18 (!) 150/88 96 %   02/01/23 0050 97.7 °F (36.5 °C) (!) 103 18 (!) 152/101 97 %   01/31/23 1956 -- (!) 105 18 -- 95 %   01/31/23 1948 97.5 °F (36.4 °C) (!) 107 19 (!) 146/91 95 %   01/31/23 1551 -- (!) 103 19 -- 93 %   01/31/23 1506 98.2 °F (36.8 °C) (!) 101 18 (!) 133/90 92 %   01/31/23 1151 -- (!) 107 18 -- 93 %   01/31/23 1142 -- (!) 110 17 -- 93 %   01/31/23 1033 98.1 °F (36.7 °C) (!) 105 18 (!) 145/84 95 %   01/31/23 0835 -- 86 17 -- 93 %   01/31/23 0743 98.6 °F (37 °C) (!) 106 18 (!) 138/95 97 %         Oxygen Therapy  SpO2: 96 %  Pulse Oximeter Device Mode: Continuous  Pulse Oximeter Device Location: Finger  O2 Device: High flow nasal cannula  Skin Assessment: Clean, dry, & intact  FiO2 : 40 %  O2 Flow Rate (L/min): 5 L/min    Estimated body mass index is 47.33 kg/m² as calculated from the following:    Height as of this encounter: 5' 6\" (1.676 m). Weight as of this encounter: 293 lb 3.4 oz (133 kg).     Intake/Output Summary (Last 24 hours) at 2/1/2023 0725  Last data filed at 2/1/2023 0056  Gross per 24 hour Intake 0 ml   Output 550 ml   Net -550 ml           Physical Exam:  Blood pressure (!) 150/88, pulse (!) 103, temperature 97.7 °F (36.5 °C), temperature source Oral, resp. rate 18, height 5' 6\" (1.676 m), weight 293 lb 3.4 oz (133 kg), SpO2 96 %. General:    Morbidly obese. Chronically ill-appearing but overall improved  head:  Normocephalic, atraumatic. Eyes:  Sclerae appear normal.  Pupils equally round. ENT:  Nares appear normal.  Dry oral mucosa. Neck:  No restricted ROM. Trachea midline   CV:   RRR. No m/r/g. No jugular venous distension. Lungs:   Bilateral rales, no wheezes or rhonchi. 15L NRB, bedside sats high 90s. Abdomen:   Soft, nontender, nondistended. Extremities: No cyanosis or clubbing. No edema. Skin:     No rashes and normal coloration. Warm and dry. Neuro:  Unable to assess. Lethargic but opens eyes and groans to voice and painful stimuli. Pupils are equal.  Psych:  Unable to fully assess. I have personally reviewed labs and tests:  Recent Labs:  Recent Results (from the past 24 hour(s))   Vancomycin Level, Random    Collection Time: 02/01/23  4:01 AM   Result Value Ref Range    Vancomycin Rm 15.7 UG/ML       I have personally reviewed imaging studies:  XR CHEST PORTABLE    Result Date: 1/26/2023  PORTABLE CHEST 1 VIEW HISTORY: Shortness of breath COMPARISON: 4/17/2021 FINDINGS: Consolidation is present throughout the left lung and in the right upper lobe. EKG leads are present. Lung volumes are diminished. Bilateral consolidation. Echocardiogram:  No results found for this or any previous visit.         Orders Placed This Encounter   Medications    azithromycin (ZITHROMAX) 500 mg in sodium chloride 0.9 % 250 mL IVPB ([id number])     Order Specific Question:   Antimicrobial Indications     Answer:   Pneumonia (CAP)    0.9 % sodium chloride bolus    ipratropium-albuterol (DUONEB) nebulizer solution 1 ampule     Order Specific Question:   Initiate RT Bronchodilator Protocol     Answer:   Yes - ED Protocol    sodium chloride flush 0.9 % injection 5-40 mL    sodium chloride flush 0.9 % injection 5-40 mL    0.9 % sodium chloride infusion    OR Linked Order Group     acetaminophen (TYLENOL) tablet 650 mg     acetaminophen (TYLENOL) suppository 650 mg    DISCONTD: cefTRIAXone (ROCEPHIN) 1,000 mg in sodium chloride 0.9 % 50 mL IVPB (mini-bag)     Order Specific Question:   Antimicrobial Indications     Answer:   Pneumonia (CAP)     Order Specific Question:   CAP duration of therapy     Answer: Other     Order Specific Question:   Other CAP Duration     Answer:   6 more days    DISCONTD: azithromycin (ZITHROMAX) 500 mg in sodium chloride 0.9 % 250 mL IVPB ([id number])     Order Specific Question:   Antimicrobial Indications     Answer:   Pneumonia (CAP)     Order Specific Question:   CAP duration of therapy     Answer:    Other     Order Specific Question:   Other CAP Duration     Answer:   6 more days    enoxaparin Sodium (LOVENOX) injection 30 mg     Order Specific Question:   Indication of Use     Answer:   Prophylaxis-DVT/PE    0.9 % sodium chloride bolus    DISCONTD: 0.9 % sodium chloride infusion    albuterol (PROVENTIL) nebulizer solution 2.5 mg     Order Specific Question:   Initiate RT Bronchodilator Protocol     Answer:   Yes - Inpatient Protocol    tuberculin injection 5 Units    cefepime (MAXIPIME) 2,000 mg in sodium chloride 0.9 % 50 mL IVPB (mini-bag)     Order Specific Question:   Antimicrobial Indications     Answer:   Pneumonia (HAP)     Order Specific Question:   HAP duration of therapy     Answer:   7 days    cefepime (MAXIPIME) 2,000 mg in sodium chloride 0.9 % 50 mL IVPB (mini-bag)     Order Specific Question:   Antimicrobial Indications     Answer:   Pneumonia (HAP)     Order Specific Question:   HAP duration of therapy     Answer:   7 days    vancomycin (VANCOCIN) 2500 mg in sodium chloride 0.9 % 500 mL IVPB     Order Specific Question:   Antimicrobial Indications     Answer:   Pneumonia (HAP)     Order Specific Question:   HAP duration of therapy     Answer:   7 days    DISCONTD: vancomycin (VANCOCIN) 1,000 mg in sodium chloride 0.9 % 250 mL IVPB ([id number])     Order Specific Question:   Antimicrobial Indications     Answer:   Pneumonia (HAP)     Order Specific Question:   HAP duration of therapy     Answer:   7 days    furosemide (LASIX) injection 20 mg    OLANZapine (ZYPREXA) tablet 20 mg    doxepin (SINEQUAN) capsule 100 mg    DISCONTD: vancomycin (VANCOCIN) 1750 mg in sodium chloride 0.9 % 500 mL IVPB     Order Specific Question:   Antimicrobial Indications     Answer:   Pneumonia (HAP)     Order Specific Question:   HAP duration of therapy     Answer:   7 days    furosemide (LASIX) injection 20 mg    ipratropium-albuterol (DUONEB) nebulizer solution 1 ampule     Order Specific Question:   Initiate RT Bronchodilator Protocol     Answer:   No    budesonide (PULMICORT) nebulizer suspension 500 mcg    vancomycin (VANCOCIN) 1,000 mg in sodium chloride 0.9 % 250 mL IVPB ([id number])     Order Specific Question:   Antimicrobial Indications     Answer:   Pneumonia (HAP)     Order Specific Question:   HAP duration of therapy     Answer:   7 days         Signed:  Eduin Rosales MD    Part of this note may have been written by using a voice dictation software. The note has been proof read but may still contain some grammatical/other typographical errors.

## 2023-02-01 NOTE — PLAN OF CARE
Problem: Respiratory - Adult  Goal: Achieves optimal ventilation and oxygenation  Outcome: Progressing

## 2023-02-01 NOTE — PROGRESS NOTES
ACUTE PHYSICAL THERAPY GOALS:   (Developed with and agreed upon by patient and/or caregiver.)  STG:  (1.)Ms. Chema Delgado will move from supine to sit and sit to supine , scoot up and down, and roll side to side with STAND BY ASSIST within 4 treatment day(s). (2.)Ms. Chema Delgado will transfer from bed to chair and chair to bed with STAND BY ASSIST using the least restrictive device within 4 treatment day(s). (3.)Ms. Chema Delgado will ambulate with STAND BY ASSIST for 25 feet with the least restrictive device within 4 treatment day(s). LTG:  (1.)Ms. Chema Delgado will move from supine to sit and sit to supine , scoot up and down, and roll side to side in bed with MODIFIED INDEPENDENCE within 7 treatment day(s). (2.)Ms. Chema Delgado will transfer from bed to chair and chair to bed with MODIFIED INDEPENDENCE using the least restrictive device within 7 treatment day(s). (3.)Ms. Chema Delgado will ambulate with MODIFIED INDEPENDENCE for 100 feet with the least restrictive device within 7 treatment day(s).   ________________________________________________________________________________________________   PHYSICAL THERAPY Initial Assessment and PM  (Link to Caseload Tracking: PT Visit Days : 1  Acknowledge Orders  Time In/Out  PT Charge Capture  Rehab Caseload Tracker    Pushpa Ng is a 45 y.o. female   PRIMARY DIAGNOSIS: Severe sepsis ([address]  Dehydration [E86.0]  Acute respiratory failure with hypoxia (HCC) [J96.01]  Sepsis ([address] [A41.9]  Acute renal failure, unspecified acute renal failure type ([address] [N17.9]  Other fatigue [R53.83]  Pneumonia of both lungs due to infectious organism, unspecified part of lung [J18.9]       Reason for Referral: Other lack of cordination (R27.8)  Difficulty in walking, Not elsewhere classified (R26.2)  Other abnormalities of gait and mobility (R26.89)  Inpatient: Payor: [facility] MEDICARE / Plan: [facility] [employer] DUAL COMPLETE / Product Type: *No Product type* /     ASSESSMENT:     REHAB RECOMMENDATIONS: Recommendation to date pending progress:  Setting:  Home Health Therapy    Equipment:    Rolling Walker     ASSESSMENT:  Ms. Bhavesh Rivera is a pleasant middle aged disabled female with above diagnosis who demonstrates with decreased transfers, ambulation and mobility below her prior functional baseline. All transfers are currently limited at [address] x 2 out of bed to sit with fair balance followed by stand then ambulation for 7 feet with the deficits stated below. Rasta Shaffer then is seated in the bedside chair with CGA x 1. Skilled PT is indicated for this patient's functional mobility deficits. [facility] [address] [address] AM-PAC 6 Clicks Basic Mobility Inpatient Short Form  AM-PAC Mobility Inpatient   How much difficulty turning over in bed?: A Little  How much difficulty sitting down on / standing up from a chair with arms?: A Little  How much difficulty moving from lying on back to sitting on side of bed?: A Little  How much help from another person moving to and from a bed to a chair?: A Little  How much help from another person needed to walk in hospital room?: A Little  How much help from another person for climbing 3-5 steps with a railing?: A Little  [facility] Inpatient Mobility Raw Score : 18  AM-PAC Inpatient T-Scale Score : 43.63  Mobility Inpatient CMS 0-100% Score: 46.58  Mobility Inpatient CMS G-Code Modifier : CK    SUBJECTIVE:   Ms. Bhavesh Rivera states, \"Am I really getting better? \"     Social/Functional Lives With: Parent  Type of Home: House  Home Layout: One level  Home Equipment: [place], standard  Receives Help From: Family  ADL Assistance: Needs assistance  Homemaking Responsibilities: Yes  Ambulation Assistance: Needs assistance  Transfer Assistance: Independent  Active : No    OBJECTIVE:     PAIN: VITALS / O2: PRECAUTION / Drena Balzarine / DRAINS:   Pre Treatment: 0/10 no pain reported.             Post Treatment: 0/10  Vitals        Oxygen 6 liters 02 None    RESTRICTIONS/PRECAUTIONS:  Restrictions/Precautions: Fall Risk                 GROSS EVALUATION: Intact Impaired (Comments):   AROM []  Limited    PROM []    Strength []  Limited LE strength    Balance []  FAIR    Posture [] N/A  Forward Head  Rounded Shoulders  Thoracic Kyphosis   Sensation []     Coordination []   Limited    Tone []  Abnormal    Edema []    Activity Tolerance []  Fair     []      COGNITION/  PERCEPTION: Intact Impaired (Comments):   Orientation [x]     Vision [x]     Hearing [x]     Cognition  [x]       MOBILITY: I Mod I S SBA CGA Min Mod Max Total  NT x2 Comments:   Bed Mobility    Rolling [] [] [] [] [x] [] [] [] [] [] [x]    Supine to Sit [] [] [] [] [x] [] [] [] [] [] [x]    Scooting [] [] [] [] [x] [] [] [] [] [] [x]    Sit to Supine [] [] [] [] [x] [] [] [] [] [] [x]    Transfers    Sit to Stand [] [] [] [] [x] [] [] [] [] [] [x]    Bed to Chair [] [] [] [] [x] [] [] [] [] [] [x]    Stand to Sit [] [] [] [] [x] [] [] [] [] [] [x]     [] [] [] [] [] [] [] [] [] [] []    I=Independent, Mod I=Modified Independent, S=Supervision, SBA=Standby Assistance, CGA=Contact Guard Assistance,   Min=Minimal Assistance, Mod=Moderate Assistance, Max=Maximal Assistance, Total=Total Assistance, NT=Not Tested    GAIT: I Mod I S SBA CGA Min Mod Max Total  NT x2 Comments:   Level of Assistance [] [] [] [] [x] [] [] [] [] [] [x]    Distance 7 feet    DME HHA x 2     Gait Quality Decreased leon , Decreased step clearance, Decreased step length, and Decreased stance    Weightbearing Status Restrictions/Precautions  Restrictions/Precautions: Fall Risk    Stairs      I=Independent, Mod I=Modified Independent, S=Supervision, SBA=Standby Assistance, CGA=Contact Guard Assistance,   Min=Minimal Assistance, Mod=Moderate Assistance, Max=Maximal Assistance, Total=Total Assistance, NT=Not Tested    PLAN:   FREQUENCY AND DURATION: 3 times/week for duration of hospital stay or until stated goals are met, whichever comes first.    THERAPY PROGNOSIS: Good    PROBLEM LIST:   (Skilled intervention is medically necessary to address:)  Decreased Activity Tolerance  Decreased AROM/PROM  Decreased Balance  Decreased Cognition  Decreased Coordination  Decreased Gait Ability  Decreased Safety Awareness  Decreased Strength  Decreased Transfer Abilities INTERVENTIONS PLANNED:   (Benefits and precautions of physical therapy have been discussed with the patient.)  Therapeutic Activity  Therapeutic Exercise/HEP  Neuromuscular Re-education  Gait Training       TREATMENT:   EVALUATION: LOW COMPLEXITY: (Untimed Charge)    TREATMENT:   Therapeutic Activity (23 Minutes): Therapeutic activity included Rolling, Supine to Sit, Sit to Supine, Scooting, Lateral Scooting, Transfer Training, Ambulation on level ground, Sitting balance , and Standing balance to improve functional Activity tolerance, Balance, Coordination, Mobility, and Strength.     TREATMENT GRID:  N/A    AFTER TREATMENT PRECAUTIONS: Alarm Activated, Bed/Chair Locked, Call light within reach, Chair, Needs within reach, RN notified, and Side rails x3    INTERDISCIPLINARY COLLABORATION:  RN/ PCT and PT/ PTA    EDUCATION: Education Given To: Patient  Education Provided: Role of Therapy  Education Method: Verbal  Barriers to Learning: Cognition  Education Outcome: Verbalized understanding    TIME IN/OUT:  Time In: 1405  Time Out: 9668  Minutes: [address], [place]

## 2023-02-02 PROBLEM — R00.0 TACHYCARDIA: Status: ACTIVE | Noted: 2023-02-02

## 2023-02-02 LAB
ANION GAP SERPL CALC-SCNC: 4 MMOL/L (ref 2–11)
BUN SERPL-MCNC: 11 MG/DL (ref 6–23)
CALCIUM SERPL-MCNC: 8.7 MG/DL (ref 8.3–10.4)
CHLORIDE SERPL-SCNC: 105 MMOL/L (ref 101–110)
CO2 SERPL-SCNC: 31 MMOL/L (ref 21–32)
CREAT SERPL-MCNC: 0.7 MG/DL (ref 0.6–1)
EKG ATRIAL RATE: 129 BPM
EKG DIAGNOSIS: NORMAL
EKG P AXIS: 41 DEGREES
EKG P-R INTERVAL: 136 MS
EKG Q-T INTERVAL: 304 MS
EKG QRS DURATION: 88 MS
EKG QTC CALCULATION (BAZETT): 445 MS
EKG R AXIS: 65 DEGREES
EKG T AXIS: 3 DEGREES
EKG VENTRICULAR RATE: 129 BPM
GLUCOSE SERPL-MCNC: 108 MG/DL (ref 65–100)
POTASSIUM SERPL-SCNC: 4.5 MMOL/L (ref 3.5–5.1)
SODIUM SERPL-SCNC: 140 MMOL/L (ref 133–143)

## 2023-02-02 PROCEDURE — 2580000003 HC RX 258: Performed by: INTERNAL MEDICINE

## 2023-02-02 PROCEDURE — 97535 SELF CARE MNGMENT TRAINING: CPT

## 2023-02-02 PROCEDURE — 97530 THERAPEUTIC ACTIVITIES: CPT

## 2023-02-02 PROCEDURE — 6370000000 HC RX 637 (ALT 250 FOR IP): Performed by: INTERNAL MEDICINE

## 2023-02-02 PROCEDURE — 97165 OT EVAL LOW COMPLEX 30 MIN: CPT

## 2023-02-02 PROCEDURE — 94761 N-INVAS EAR/PLS OXIMETRY MLT: CPT

## 2023-02-02 PROCEDURE — 1100000000 HC RM PRIVATE

## 2023-02-02 PROCEDURE — 93005 ELECTROCARDIOGRAM TRACING: CPT | Performed by: INTERNAL MEDICINE

## 2023-02-02 PROCEDURE — 36415 COLL VENOUS BLD VENIPUNCTURE: CPT

## 2023-02-02 PROCEDURE — 6360000002 HC RX W HCPCS: Performed by: INTERNAL MEDICINE

## 2023-02-02 PROCEDURE — 6360000002 HC RX W HCPCS: Performed by: FAMILY MEDICINE

## 2023-02-02 PROCEDURE — 2700000000 HC OXYGEN THERAPY PER DAY

## 2023-02-02 PROCEDURE — 2580000003 HC RX 258: Performed by: FAMILY MEDICINE

## 2023-02-02 PROCEDURE — 94760 N-INVAS EAR/PLS OXIMETRY 1: CPT

## 2023-02-02 PROCEDURE — 2500000003 HC RX 250 WO HCPCS: Performed by: INTERNAL MEDICINE

## 2023-02-02 PROCEDURE — 99223 1ST HOSP IP/OBS HIGH 75: CPT | Performed by: INTERNAL MEDICINE

## 2023-02-02 PROCEDURE — 80048 BASIC METABOLIC PNL TOTAL CA: CPT

## 2023-02-02 PROCEDURE — 94640 AIRWAY INHALATION TREATMENT: CPT

## 2023-02-02 PROCEDURE — 6370000000 HC RX 637 (ALT 250 FOR IP): Performed by: NURSE PRACTITIONER

## 2023-02-02 RX ORDER — DULOXETIN HYDROCHLORIDE 60 MG/1
60 CAPSULE, DELAYED RELEASE ORAL DAILY
Status: DISCONTINUED | OUTPATIENT
Start: 2023-02-02 | End: 2023-02-06 | Stop reason: HOSPADM

## 2023-02-02 RX ORDER — ALPRAZOLAM 0.5 MG/1
1 TABLET ORAL 4 TIMES DAILY PRN
Status: DISCONTINUED | OUTPATIENT
Start: 2023-02-02 | End: 2023-02-06 | Stop reason: HOSPADM

## 2023-02-02 RX ORDER — METOPROLOL TARTRATE 5 MG/5ML
5 INJECTION INTRAVENOUS EVERY 6 HOURS PRN
Status: DISCONTINUED | OUTPATIENT
Start: 2023-02-02 | End: 2023-02-06 | Stop reason: HOSPADM

## 2023-02-02 RX ADMIN — BUDESONIDE 500 MCG: 0.5 INHALANT RESPIRATORY (INHALATION) at 08:24

## 2023-02-02 RX ADMIN — IPRATROPIUM BROMIDE AND ALBUTEROL SULFATE 1 AMPULE: 2.5; .5 SOLUTION RESPIRATORY (INHALATION) at 19:50

## 2023-02-02 RX ADMIN — CEFEPIME 2000 MG: 2 INJECTION, POWDER, FOR SOLUTION INTRAVENOUS at 04:57

## 2023-02-02 RX ADMIN — ENOXAPARIN SODIUM 30 MG: 100 INJECTION SUBCUTANEOUS at 22:15

## 2023-02-02 RX ADMIN — ESCITALOPRAM OXALATE 20 MG: 10 TABLET, FILM COATED ORAL at 09:19

## 2023-02-02 RX ADMIN — IPRATROPIUM BROMIDE AND ALBUTEROL SULFATE 1 AMPULE: 2.5; .5 SOLUTION RESPIRATORY (INHALATION) at 11:19

## 2023-02-02 RX ADMIN — VANCOMYCIN HYDROCHLORIDE 1250 MG: 10 INJECTION, POWDER, LYOPHILIZED, FOR SOLUTION INTRAVENOUS at 09:19

## 2023-02-02 RX ADMIN — CEFEPIME 2000 MG: 2 INJECTION, POWDER, FOR SOLUTION INTRAVENOUS at 13:11

## 2023-02-02 RX ADMIN — IPRATROPIUM BROMIDE AND ALBUTEROL SULFATE 1 AMPULE: 2.5; .5 SOLUTION RESPIRATORY (INHALATION) at 15:27

## 2023-02-02 RX ADMIN — DOXEPIN HYDROCHLORIDE 100 MG: 50 CAPSULE ORAL at 22:14

## 2023-02-02 RX ADMIN — IPRATROPIUM BROMIDE AND ALBUTEROL SULFATE 1 AMPULE: 2.5; .5 SOLUTION RESPIRATORY (INHALATION) at 08:24

## 2023-02-02 RX ADMIN — DULOXETINE HYDROCHLORIDE 60 MG: 60 CAPSULE, DELAYED RELEASE ORAL at 17:17

## 2023-02-02 RX ADMIN — ENOXAPARIN SODIUM 30 MG: 100 INJECTION SUBCUTANEOUS at 09:19

## 2023-02-02 RX ADMIN — SODIUM CHLORIDE, PRESERVATIVE FREE 10 ML: 5 INJECTION INTRAVENOUS at 22:17

## 2023-02-02 RX ADMIN — CEFEPIME 2000 MG: 2 INJECTION, POWDER, FOR SOLUTION INTRAVENOUS at 22:15

## 2023-02-02 RX ADMIN — BUDESONIDE 500 MCG: 0.5 INHALANT RESPIRATORY (INHALATION) at 19:50

## 2023-02-02 RX ADMIN — VANCOMYCIN HYDROCHLORIDE 1250 MG: 10 INJECTION, POWDER, LYOPHILIZED, FOR SOLUTION INTRAVENOUS at 22:16

## 2023-02-02 RX ADMIN — METOPROLOL TARTRATE 5 MG: 5 INJECTION, SOLUTION INTRAVENOUS at 16:56

## 2023-02-02 RX ADMIN — OLANZAPINE 20 MG: 5 TABLET, FILM COATED ORAL at 22:14

## 2023-02-02 RX ADMIN — SODIUM CHLORIDE, PRESERVATIVE FREE 10 ML: 5 INJECTION INTRAVENOUS at 09:19

## 2023-02-02 ASSESSMENT — ENCOUNTER SYMPTOMS
GASTROINTESTINAL NEGATIVE: 1
COUGH: 1
SHORTNESS OF BREATH: 1
ALLERGIC/IMMUNOLOGIC NEGATIVE: 1

## 2023-02-02 NOTE — H&P
[facility] Cardiology Initial Cardiac Evaluation                 Date of  Admission: 1/26/2023 12:56 PM     Primary Care Physician:  None  Primary Cardiologist:  None   Referring Physician:  Dr. Edgardo Boswell  Attending Physician:  Dr. Saw Lea     CC/Reason for consult:  sinus tachycardia      Haroon Reagan is a 45 y.o. female with PMH of Schizophrenia and obesity, who presented to the ED with c/o fever,weakness and cough x 3 day. She had a fall and seemed confused as well. . Labs with mild leukopenia, sCr 2.4 (normal baseline), procal 0.13, normal LA. ABG showed pH 7.42, CO2 34. CXR with bilateral consolidations (L>R). She was given ceftriaxone via EMS and azithromycin was added in the ER. She has been given IVFs and BP improved. Rapid COVID is negative. We are consulted for admission and further management. Patient was admitted for treatment of pna. She was treated with IV abx and IVF per sepsis protocol. CXR looked worse on 1/27 with pulmonary edema and she was treated with IVP lasix. Oxygen has been weaned down since admission from [place] and she is now on 5L NC. She has been tachycardic and EKG showed ST @ 129 today. She is currently ST @ 105 on the monitor. Previous EKGs in 2021 show ST in 110s. She has no cardiac history per review of the chart. Past Medical History:   Diagnosis Date    Psychiatric disorder       Past Surgical History:   Procedure Laterality Date    GI      umbilical hernia repair     Allergies   Allergen Reactions    Penicillins Hives      No family history on file.    Social History     Socioeconomic History    Marital status: Single     Spouse name: Not on file    Number of children: Not on file    Years of education: Not on file    Highest education level: Not on file   Occupational History    Not on file   Tobacco Use    Smoking status: Never    Smokeless tobacco: Never   Substance and Sexual Activity    Alcohol use: No    Drug use: Not on file    Sexual activity: Not on file Other Topics Concern    Not on file   Social History Narrative    Not on file     Social Determinants of Health     Financial Resource Strain: Not on file   Food Insecurity: Not on file   Transportation Needs: Not on file   Physical Activity: Not on file   Stress: Not on file   Social Connections: Not on file   Intimate Partner Violence: Not on file   Housing Stability: Not on file       Current Facility-Administered Medications   Medication Dose Route Frequency    metoprolol (LOPRESSOR) injection 5 mg  5 mg IntraVENous Q6H PRN    ALPRAZolam (XANAX) tablet 1 mg  1 mg Oral 4x Daily PRN    DULoxetine (CYMBALTA) extended release capsule 60 mg  60 mg Oral Daily    vancomycin (VANCOCIN) 1250 mg in sodium chloride 0.9% 250 mL IVPB  1,250 mg IntraVENous Q12H    escitalopram (LEXAPRO) tablet 20 mg  20 mg Oral Daily    valproic acid (DEPAKENE) capsule 1,000 mg (Patient Supplied)  1,000 mg Oral QHS    ipratropium-albuterol (DUONEB) nebulizer solution 1 ampule  1 ampule Inhalation Q4H WA    budesonide (PULMICORT) nebulizer suspension 500 mcg  0.5 mg Nebulization BID    OLANZapine (ZYPREXA) tablet 20 mg  20 mg Oral Nightly    doxepin (SINEQUAN) capsule 100 mg  100 mg Oral Nightly    cefepime (MAXIPIME) 2,000 mg in sodium chloride 0.9 % 50 mL IVPB (mini-bag)  2,000 mg IntraVENous Q8H    sodium chloride flush 0.9 % injection 5-40 mL  5-40 mL IntraVENous 2 times per day    sodium chloride flush 0.9 % injection 5-40 mL  5-40 mL IntraVENous PRN    0.9 % sodium chloride infusion   IntraVENous PRN    acetaminophen (TYLENOL) tablet 650 mg  650 mg Oral Q6H PRN    Or    acetaminophen (TYLENOL) suppository 650 mg  650 mg Rectal Q6H PRN    enoxaparin Sodium (LOVENOX) injection 30 mg  30 mg SubCUTAneous BID    albuterol (PROVENTIL) nebulizer solution 2.5 mg  2.5 mg Nebulization Q6H PRN       Review of Systems   Constitutional: Negative. HENT: Negative. Respiratory:  Positive for cough and shortness of breath.     Cardiovascular: Negative. Gastrointestinal: Negative. Endocrine: Negative. Genitourinary: Negative. Musculoskeletal: Negative. Skin: Negative. Allergic/Immunologic: Negative. Neurological: Negative. Hematological: Negative. Psychiatric/Behavioral:  Positive for confusion. Physical Exam  Vitals:    02/02/23 0723 02/02/23 0826 02/02/23 1205 02/02/23 1542   BP: 127/80  (!) 136/100 (!) 117/96   Pulse: (!) 108  (!) 130 (!) 133   Resp: 20  20 24   Temp: 97.5 °F (36.4 °C)  97.9 °F (36.6 °C) 98.1 °F (36.7 °C)   TempSrc: Oral  Oral Oral   SpO2: 95% 94% 93% 95%   Weight:       Height:           Physical Exam:  Physical Exam  Eyes:      Pupils: Pupils are equal, round, and reactive to light. Cardiovascular:      Rate and Rhythm: Regular rhythm. Tachycardia present. Pulmonary:      Effort: Pulmonary effort is normal.      Comments: coarse  Abdominal:      General: Bowel sounds are normal.   Musculoskeletal:         General: Normal range of motion. Skin:     General: Skin is warm and dry. Neurological:      General: No focal deficit present. Mental Status: She is alert. Mental status is at baseline. Psychiatric:         Mood and Affect: Mood normal.         Behavior: Behavior normal.         Thought Content:  Thought content normal.         Judgment: Judgment normal.        Cardiographics    Telemetry:  ST   ECG: sinus tachycardia  Echocardiogram:  pending    Labs:   Recent Results (from the past 24 hour(s))   Basic Metabolic Panel w/ Reflex to MG    Collection Time: 02/02/23 10:08 AM   Result Value Ref Range    Sodium 140 133 - 143 mmol/L    Potassium 4.5 3.5 - 5.1 mmol/L    Chloride 105 101 - 110 mmol/L    CO2 31 21 - 32 mmol/L    Anion Gap 4 2 - 11 mmol/L    Glucose 108 (H) 65 - 100 mg/dL    BUN 11 6 - 23 MG/DL    Creatinine 0.70 0.6 - 1.0 MG/DL    Est, Glom Filt Rate >60 >60 ml/min/1.73m2    Calcium 8.7 8.3 - 10.4 MG/DL   EKG 12 Lead    Collection Time: 02/02/23 12:56 PM   Result Value Ref Range    Ventricular Rate 129 BPM    Atrial Rate 129 BPM    P-R Interval 136 ms    QRS Duration 88 ms    Q-T Interval 304 ms    QTc Calculation (Bazett) 445 ms    P Axis 41 degrees    R Axis 65 degrees    T Axis 3 degrees    Diagnosis Sinus tachycardia        Assessment/Plan:     Assessment:      Principal Problem:    Severe sepsis   -- on abx, improving      Tachycardia  -- add metoprolol 25 mg BID  -- check echo      EDU (acute kidney injury)   -- resolved       Schizophrenia   -- per primary       Acute metabolic encephalopathy  -- see is alert and at baseline per nursing staff      Acute hypoxemic respiratory failure  -- on 5L      Morbid obesity            Thank you very much for this referral. We appreciate the opportunity to participate in this patient's care. We will follow along with above stated plan.     Demetrius Alpers, APRN - CNP  Attending MD: Rosiland Ormond

## 2023-02-02 NOTE — PROGRESS NOTES
[facility] CARDIOLOGY     2/2/2023     5:49 PM    I have personally seen and examined Ed Collet with  Ann Guerrero. I agree and confirm findings in history, physical exam, and assessment/plan as outlined above with following pertinent additions/exceptions:       See separately dictated BREE note. 49-year-old female presents with bilateral pneumonia due to an unspecified organism. Found by her primary team to be in sinus tachycardia. Cardiology is consulted for evaluation of her sinus tachycardia. She has received IV fluids due to her infection had some mild pulmonary edema due to this and ended up getting Lasix. She denies chest pain or shortness of breath. RRR normal S1/S2  Lungs CTA  Abd NT ND  Normal PPP  AOx3    A/P  -Sinus tachycardia is almost exclusively caused by forces outside the heart and is only an exceedingly rare situations caused by a cardiac condition itself. We will check an echocardiogram to rule out heart failure as the etiology we would advise starting a low-dose beta-blocker and treating her infection as this is likely driving her sinus tach. -She also carries the dose diagnosis of schizophrenia she is in an unfamiliar setting has limited mental acuity and is frightened this can also cause elevated sympathetic tone and sinus tach. -The addition of a low-dose beta-blocker may also help treat mild hypertension.  -Treatment of pneumonia per hospitalist team agree with antibiotics.       Esme Snell MD

## 2023-02-02 NOTE — PROGRESS NOTES
VANCO DAILY FOLLOW UP NOTE  [address] Pharmacokinetic Monitoring Service - Vancomycin    Consulting Provider: Dr. Marissa Turcios   Indication: HAP  Target Concentration: Goal AUC/-600 mg*hr/L  Day of Therapy: 7 of 7  Additional Antimicrobials: cefepime    Patient eligible for piperacillin-tazobactam to cefepime auto-substitution per P&T approved protocol? N/A    Pertinent Laboratory Values: Wt Readings from Last 1 Encounters:   02/02/23 294 lb 5 oz (133.5 kg)     Temp Readings from Last 1 Encounters:   02/02/23 97.5 °F (36.4 °C) (Oral)     Recent Labs     01/31/23  0424   BUN 8   CREATININE 0.60     Estimated Creatinine Clearance: 151 mL/min (based on SCr of 0.6 mg/dL). Lab Results   Component Value Date/Time    VANCORANDOM 15.7 02/01/2023 04:01 AM     MRSA Nasal Swab: not ordered. Order placed by pharmacy    Assessment:  Date/Time Dose Concentration AUC   1/29 0402 1000 mg q12h 14.2 276   1/31 0424 1750 mg q12h 27.9 826   2/1 0401 1000 mg q12h 15.7 385   Note: Serum concentrations collected for AUC dosing may appear elevated if collected in close proximity to the dose administered, this is not necessarily an indication of toxicity    Plan:  Continue vancomycin 1250 mg IV q12h for predicted AUC/Tr of 477/15.9  Today is EOT  Pharmacy will continue to monitor patient and adjust therapy as indicated    Thank you for the consult,  Claudia Rosales [facility]

## 2023-02-02 NOTE — PROGRESS NOTES
Hospitalist Progress Note   Admit Date:  2023 12:56 PM   Name:  Rasta Shaffer   Age:  45 y.o. Sex:  female  :  1970   MRN:  [id number]   Room:  [facility]/    Presenting Complaint: Fatigue     Reason(s) for Admission: Dehydration [E86.0]  Acute respiratory failure with hypoxia ([address] [J96.01]  Sepsis ([address] [A41.9]  Acute renal failure, unspecified acute renal failure type ([address] [N17.9]  Other fatigue [R53.83]  Pneumonia of both lungs due to infectious organism, unspecified part of lung [J18.9]     History of Present Illness:   Rasta Shaffer is a 45 y.o. female with a h/o schizophrenia and morbid obesity who presents today with complaints of weakness. Her mom is at bedside who provides the history. Patient developed a cough about 3 days ago. She had a fall yesterday and today seemed weaker and more confused. Oral intake has been reduced. Subjective fever noted today. Otherwise no chest pain, headaches, syncope, palpitations, N/V/D, abdo pain. Patient's mom, Gertrudis Lin, had a cough and pneumonia recently and was treated with antibiotics, though never told she had COVID or influenza. VS noted tachycardia low 100s, BP improved after IVFs. Labs with mild leukopenia, sCr 2.4 (normal baseline), procal 0.13, normal LA. ABG showed pH 7.42, CO2 34. CXR with bilateral consolidations (L>R). She was given ceftriaxone via EMS and azithromycin was added in the ER. She has been given IVFs and BP improved. Rapid COVID is negative. We are consulted for admission and further management. 2023  Patient without acute event overnight. Tachycardic currently however. Assessment & Plan:   # Severe sepsis and acute hypoxemic respiratory failure  CAP vs viral pneumonia    2023  Continue Cefepime  Follow-up cultures  Cleared by speech for diet.     Now weaned to oxygen 5 L via nasal cannula-we will continue to wean as tolerated  Rpt cxr previously done showed worsening infiltrates  She is on cefepime   she is status post Lasix 20mg iv x 2 with appropriate output    # Acute septic/metabolic encephalopathy   - ABG showed normal pH without hypocapnea   - Will hold schizophrenia medications today to avoid further sedation. 2/2/2023  Resolved   continue Zyprexa and and doxepin  Will restart Cymbalta    #Tachycardia  Appears to be sinus tach  Will consult Cardiology and add prn lopressor    # EDU   - Likely sepsis and pre-renal. Normal baseline. Recheck tomorrow. Monitor urine output. Further work up if worsens or no improvement. 2/2/2023  Remains resolved      # Schizophrenia   - Managed by Dr. Brinda Patel at [facility] (dx at age 13)   - Holding meds as above. Resume as soon as possible pending improvement in mentation. 2/2/2023  Continue Zyprexa 20 mg p.o. daily nightly  On Lexapro 20 mg daily and and Depakene  Restarting Cymbalta and prn Xanax      # Morbid obesity   - Complicates care. Anticipated discharge needs: Anticipate home in the next 48 to 72 hours. She lives at home with family. PT/OT recommends Home Health    diet: Continue easy to chew diet   VTE ppx: Lovenox BID  Code status: full code        Hospital Problems:  Principal Problem:    Severe sepsis ([address]  Active Problems:    EDU (acute kidney injury) [address]    Schizophrenia ([facility]    Acute metabolic encephalopathy    Acute hypoxemic respiratory failure ([facility] [facility]    Morbid obesity ([facility] [facility]  Resolved Problems:    * No resolved hospital problems.  *     Objective:   Patient Vitals for the past 24 hrs:   Temp Pulse Resp BP SpO2   02/02/23 1542 98.1 °F (36.7 °C) (!) 133 24 (!) 117/96 95 %   02/02/23 1205 97.9 °F (36.6 °C) (!) 130 20 (!) 136/100 93 %   02/02/23 0826 -- -- -- -- 94 %   02/02/23 0723 97.5 °F (36.4 °C) (!) 108 20 127/80 95 %   02/02/23 0349 97.7 °F (36.5 °C) (!) 105 18 119/82 98 %   02/01/23 2359 98.4 °F (36.9 °C) 96 18 (!) 141/90 100 %   02/01/23 2001 99.1 °F (37.3 °C) (!) 112 19 119/77 100 %   02/01/23 1956 -- (!) 111 16 -- 100 %       Oxygen Therapy  SpO2: 95 %  Pulse Oximeter Device Mode: Continuous  Pulse Oximeter Device Location: Finger  O2 Device: Nasal cannula  Skin Assessment: Clean, dry, & intact  FiO2 : 40 %  O2 Flow Rate (L/min): 5 L/min    Estimated body mass index is 47.5 kg/m² as calculated from the following:    Height as of this encounter: 5' 6\" (1.676 m). Weight as of this encounter: 294 lb 5 oz (133.5 kg). Intake/Output Summary (Last 24 hours) at 2/2/2023 1652  Last data filed at 2/2/2023 1446  Gross per 24 hour   Intake 720 ml   Output 500 ml   Net 220 ml         Physical Exam:  Blood pressure (!) 117/96, pulse (!) 133, temperature 98.1 °F (36.7 °C), temperature source Oral, resp. rate 24, height 5' 6\" (1.676 m), weight 294 lb 5 oz (133.5 kg), SpO2 95 %. General:    Morbidly obese. Chronically ill-appearing but overall improved  head:  Normocephalic, atraumatic. Eyes:  Sclerae appear normal.  Pupils equally round. ENT:  Nares appear normal.  Dry oral mucosa. Neck:  No restricted ROM. Trachea midline   CV:   Tachycardic  Lungs:   Bilateral rales, no wheezes or rhonchi. 15L NRB, bedside sats high 90s. Abdomen:   Soft, nontender, nondistended. Extremities: No cyanosis or clubbing. No edema. Skin:     No rashes and normal coloration. Warm and dry. Neuro:  Unable to assess. Lethargic but opens eyes and groans to voice and painful stimuli. Pupils are equal.  Psych:  Unable to fully assess.     I have personally reviewed labs and tests:  Recent Labs:  Recent Results (from the past 24 hour(s))   Basic Metabolic Panel w/ Reflex to MG    Collection Time: 02/02/23 10:08 AM   Result Value Ref Range    Sodium 140 133 - 143 mmol/L    Potassium 4.5 3.5 - 5.1 mmol/L    Chloride 105 101 - 110 mmol/L    CO2 31 21 - 32 mmol/L    Anion Gap 4 2 - 11 mmol/L    Glucose 108 (H) 65 - 100 mg/dL    BUN 11 6 - 23 MG/DL    Creatinine 0.70 0.6 - 1.0 MG/DL    Est, Glom Filt Rate >60 >60 ml/min/1.73m2    Calcium 8.7 8.3 - 10.4 MG/DL   EKG 12 Lead Collection Time: 02/02/23 12:56 PM   Result Value Ref Range    Ventricular Rate 129 BPM    Atrial Rate 129 BPM    P-R Interval 136 ms    QRS Duration 88 ms    Q-T Interval 304 ms    QTc Calculation (Bazett) 445 ms    P Axis 41 degrees    R Axis 65 degrees    T Axis 3 degrees    Diagnosis Sinus tachycardia        I have personally reviewed imaging studies:  XR CHEST PORTABLE    Result Date: 1/26/2023  PORTABLE CHEST 1 VIEW HISTORY: Shortness of breath COMPARISON: 4/17/2021 FINDINGS: Consolidation is present throughout the left lung and in the right upper lobe. EKG leads are present. Lung volumes are diminished. Bilateral consolidation. Echocardiogram:  No results found for this or any previous visit. Orders Placed This Encounter   Medications    azithromycin (ZITHROMAX) 500 mg in sodium chloride 0.9 % 250 mL IVPB ([id number])     Order Specific Question:   Antimicrobial Indications     Answer:   Pneumonia (CAP)    0.9 % sodium chloride bolus    ipratropium-albuterol (DUONEB) nebulizer solution 1 ampule     Order Specific Question:   Initiate RT Bronchodilator Protocol     Answer:   Yes - ED Protocol    sodium chloride flush 0.9 % injection 5-40 mL    sodium chloride flush 0.9 % injection 5-40 mL    0.9 % sodium chloride infusion    OR Linked Order Group     acetaminophen (TYLENOL) tablet 650 mg     acetaminophen (TYLENOL) suppository 650 mg    DISCONTD: cefTRIAXone (ROCEPHIN) 1,000 mg in sodium chloride 0.9 % 50 mL IVPB (mini-bag)     Order Specific Question:   Antimicrobial Indications     Answer:   Pneumonia (CAP)     Order Specific Question:   CAP duration of therapy     Answer:    Other     Order Specific Question:   Other CAP Duration     Answer:   6 more days    DISCONTD: azithromycin (ZITHROMAX) 500 mg in sodium chloride 0.9 % 250 mL IVPB ([id number])     Order Specific Question:   Antimicrobial Indications     Answer:   Pneumonia (CAP)     Order Specific Question:   CAP duration of therapy Answer:    Other     Order Specific Question:   Other CAP Duration     Answer:   6 more days    enoxaparin Sodium (LOVENOX) injection 30 mg     Order Specific Question:   Indication of Use     Answer:   Prophylaxis-DVT/PE    0.9 % sodium chloride bolus    DISCONTD: 0.9 % sodium chloride infusion    albuterol (PROVENTIL) nebulizer solution 2.5 mg     Order Specific Question:   Initiate RT Bronchodilator Protocol     Answer:   Yes - Inpatient Protocol    tuberculin injection 5 Units    cefepime (MAXIPIME) 2,000 mg in sodium chloride 0.9 % 50 mL IVPB (mini-bag)     Order Specific Question:   Antimicrobial Indications     Answer:   Pneumonia (HAP)     Order Specific Question:   HAP duration of therapy     Answer:   7 days    cefepime (MAXIPIME) 2,000 mg in sodium chloride 0.9 % 50 mL IVPB (mini-bag)     Order Specific Question:   Antimicrobial Indications     Answer:   Pneumonia (HAP)     Order Specific Question:   HAP duration of therapy     Answer:   7 days    vancomycin (VANCOCIN) 2500 mg in sodium chloride 0.9 % 500 mL IVPB     Order Specific Question:   Antimicrobial Indications     Answer:   Pneumonia (HAP)     Order Specific Question:   HAP duration of therapy     Answer:   7 days    DISCONTD: vancomycin (VANCOCIN) 1,000 mg in sodium chloride 0.9 % 250 mL IVPB ([id number])     Order Specific Question:   Antimicrobial Indications     Answer:   Pneumonia (HAP)     Order Specific Question:   HAP duration of therapy     Answer:   7 days    furosemide (LASIX) injection 20 mg    OLANZapine (ZYPREXA) tablet 20 mg    doxepin (SINEQUAN) capsule 100 mg    DISCONTD: vancomycin (VANCOCIN) 1750 mg in sodium chloride 0.9 % 500 mL IVPB     Order Specific Question:   Antimicrobial Indications     Answer:   Pneumonia (HAP)     Order Specific Question:   HAP duration of therapy     Answer:   7 days    furosemide (LASIX) injection 20 mg    ipratropium-albuterol (DUONEB) nebulizer solution 1 ampule     Order Specific Question: Initiate RT Bronchodilator Protocol     Answer:   No    budesonide (PULMICORT) nebulizer suspension 500 mcg    DISCONTD: vancomycin (VANCOCIN) 1,000 mg in sodium chloride 0.9 % 250 mL IVPB ([id number])     Order Specific Question:   Antimicrobial Indications     Answer:   Pneumonia (HAP)     Order Specific Question:   HAP duration of therapy     Answer:   7 days    vancomycin (VANCOCIN) 1250 mg in sodium chloride 0.9% 250 mL IVPB     Order Specific Question:   Antimicrobial Indications     Answer:   Pneumonia (HAP)     Order Specific Question:   HAP duration of therapy     Answer:   7 days    escitalopram (LEXAPRO) tablet 20 mg    valproic acid (DEPAKENE) capsule 1,000 mg (Patient Supplied)    metoprolol (LOPRESSOR) injection 5 mg         Signed:  Angel Buckley MD    Part of this note may have been written by using a voice dictation software. The note has been proof read but may still contain some grammatical/other typographical errors.

## 2023-02-02 NOTE — PLAN OF CARE
Problem: Respiratory - Adult  Goal: Achieves optimal ventilation and oxygenation  2/2/2023 1505 by Andrea Peoples RCP  Outcome: Progressing  2/2/2023 0826 by Delores Arteaga RN  Outcome: Progressing

## 2023-02-02 NOTE — PROGRESS NOTES
ACUTE PHYSICAL THERAPY GOALS:   (Developed with and agreed upon by patient and/or caregiver.)  STG:  (1.)Ms. Gil Kilgore will move from supine to sit and sit to supine , scoot up and down, and roll side to side with STAND BY ASSIST within 4 treatment day(s). (2.)Ms. Gil Kilgore will transfer from bed to chair and chair to bed with STAND BY ASSIST using the least restrictive device within 4 treatment day(s). (3.)Ms. Gil Kilgore will ambulate with STAND BY ASSIST for 25 feet with the least restrictive device within 4 treatment day(s). LTG:  (1.)Ms. Gil Kilgore will move from supine to sit and sit to supine , scoot up and down, and roll side to side in bed with MODIFIED INDEPENDENCE within 7 treatment day(s). (2.)Ms. Gil Kilgore will transfer from bed to chair and chair to bed with MODIFIED INDEPENDENCE using the least restrictive device within 7 treatment day(s). (3.)Ms. Gil Kilgore will ambulate with MODIFIED INDEPENDENCE for 100 feet with the least restrictive device within 7 treatment day(s). PHYSICAL THERAPY: Daily Note AM   (Link to Caseload Tracking: PT Visit Days : 2  Time In/Out PT Charge Capture  Rehab Caseload Tracker  Orders    Rita Turner is a 45 y.o. female   PRIMARY DIAGNOSIS: Severe sepsis ([address]  Dehydration [E86.0]  Acute respiratory failure with hypoxia (HCC) [J96.01]  Sepsis ([address] [A41.9]  Acute renal failure, unspecified acute renal failure type ([address] [N17.9]  Other fatigue [R53.83]  Pneumonia of both lungs due to infectious organism, unspecified part of lung [J18.9]       Inpatient: Payor: [facility] MEDICARE / Plan: [place] / Product Type: *No Product type* /     ASSESSMENT:     REHAB RECOMMENDATIONS:   Recommendation to date pending progress:  Setting:  Home Health Therapy    Equipment:    To Be Determined     ASSESSMENT:  Ms. Gil Kilgore was sitting EOB upon entering the room with OT on 7L HFNC and agreeable to therapy. Today, pt demonstrates slow progress toward established goals.  Pt confused throughout duration of session although she did well to follow commands with repeated instruction/ multi-modal cueing. This session, pt required CG(A)/ intermittent Min (A) for sit-to-stand transfers and brief ambulation of 5'x1 with additional use of RW. While moving on their feet, pt cont to ambulate c small, shuffling steps and increased postural sway although she ultimately did well to avoid any LOB or miss-steps. HR was in the 110-120 range at rest and elevated as high as 140's-150's while moving; RN informed and entered room to observe. Pt performed all activity on 7L HFNC and denied any dizziness, lightheadedness or SOB throughout duration of session. At this time, pt remains a good candidate for skilled PT and will continue to benefit from advancing POC. At end of session, pt was positioned to comfort in chair with all needs in reach. RN was made aware of pt performance.         SUBJECTIVE:   Ms. Gab Caceres states, \"I don't want to have children\"     Social/Functional Lives With: Parent  Type of Home: House  Home Layout: One level  Home Equipment: [place], standard  Receives Help From: Family  ADL Assistance: Needs assistance  Homemaking Responsibilities: Yes  Ambulation Assistance: Needs assistance  Transfer Assistance: Independent  Active : No  OBJECTIVE:     PAIN: Fatou Stake / O2: Garrett Sins / Earl Rathke / DRAINS:   Pre Treatment: 0/10         Post Treatment: 0/10 Vitals        Oxygen   7L HFNC IV and Purewick    RESTRICTIONS/PRECAUTIONS:  Restrictions/Precautions  Restrictions/Precautions: Fall Risk  Restrictions/Precautions: Fall Risk     MOBILITY: I Mod I S SBA CGA Min Mod Max Total  NT x2 Comments:   Bed Mobility    Rolling [] [] [] [] [] [] [] [] [] [] [] EOB c OT on entry to room   Supine to Sit [] [] [] [] [] [] [] [] [] [] []    Scooting [] [] [] [] [x] [] [] [] [] [] []    Sit to Supine [] [] [] [] [] [] [] [] [] [] []    Transfers    Sit to Stand [] [] [] [] [x] [x] [] [] [] [] [] Intermittent Min (A) to correct sway   Bed to Chair [] [] [] [] [x] [x] [] [] [] [] []    Stand to Sit [] [] [] [] [x] [x] [] [] [] [] []     [] [] [] [] [] [] [] [] [] [] []    I=Independent, Mod I=Modified Independent, S=Supervision, SBA=Standby Assistance, CGA=Contact Guard Assistance,   Min=Minimal Assistance, Mod=Moderate Assistance, Max=Maximal Assistance, Total=Total Assistance, NT=Not Tested    BALANCE: Good Fair+ Fair Fair- Poor NT Comments   Sitting Static [x] [] [] [] [] []    Sitting Dynamic [] [x] [] [] [] []              Standing Static [] [x] [] [] [] []    Standing Dynamic [] [x] [x] [] [] []      GAIT: I Mod I S SBA CGA Min Mod Max Total  NT x2 Comments:   Level of Assistance [] [] [] [] [x] [] [] [] [] [] []    Distance   5'x1    DME Rolling Walker    Gait Quality Decreased leon , Decreased step clearance, Decreased step length, Shuffling , Trunk sway increased, and Wide base of support    Weightbearing Status      Stairs      I=Independent, Mod I=Modified Independent, S=Supervision, SBA=Standby Assistance, CGA=Contact Guard Assistance,   Min=Minimal Assistance, Mod=Moderate Assistance, Max=Maximal Assistance, Total=Total Assistance, NT=Not Tested    PLAN:   FREQUENCY AND DURATION: 3 times/week for duration of hospital stay or until stated goals are met, whichever comes first.    TREATMENT:   TREATMENT:   Therapeutic Activity (24 Minutes): Therapeutic activity included Scooting, Lateral Scooting, Transfer Training, Ambulation on level ground, Sitting balance , and Standing balance to improve functional Activity tolerance, Balance, Mobility, and Strength.     TREATMENT GRID:  N/A    AFTER TREATMENT PRECAUTIONS: Bed/Chair Locked, Call light within reach, Chair, Needs within reach, RN at bedside, and RN notified    INTERDISCIPLINARY COLLABORATION:  RN/ PCT, PT/ PTA, and OT/ SUN    EDUCATION:      TIME IN/OUT:  Time In: 1017  Time Out: [address] [facility]  Minutes: 24    Lisandro He, PT

## 2023-02-02 NOTE — PROGRESS NOTES
Pt is resting in bed with no complaints pain or discomfort at this time. Pt remains on 5L NC with even and unlabored respirations. Pt has continuous 02 monitor at the bedside with sats at 95%, No further needs expressed at this time.

## 2023-02-02 NOTE — PROGRESS NOTES
ACUTE OCCUPATIONAL THERAPY GOALS:   (Developed with and agreed upon by patient and/or caregiver.)  1. Patient will complete lower body bathing and dressing with SUPERVISION and adaptive equipment as needed. 2.Patient will complete upper body bathing and dressing with SUPERVISION and adaptive equipment as needed. 3. Patient will complete toileting with MIN A.   4. Patient will tolerate 30 minutes of OT treatment with 1-2 rest breaks to increase activity tolerance for ADLs. 5. Patient will complete functional transfers with SUPERVISION and adaptive equipment as needed. 6. Patient will complete simple grooming task sitting unsupported with SUPERVISION. Timeframe: 7 visits      OCCUPATIONAL THERAPY Initial Assessment and Daily Note       OT Visit Days: 1  Acknowledge Orders  Time  OT Charge Capture  Rehab Caseload Tracker      Lidya Shannon is a 45 y.o. female   PRIMARY DIAGNOSIS: Severe sepsis ([address]  Dehydration [E86.0]  Acute respiratory failure with hypoxia (HCC) [J96.01]  Sepsis ([address] [A41.9]  Acute renal failure, unspecified acute renal failure type ([address] [N17.9]  Other fatigue [R53.83]  Pneumonia of both lungs due to infectious organism, unspecified part of lung [J18.9]       Reason for Referral: Generalized Muscle Weakness (M62.81)  Other lack of cordination (R27.8)  Difficulty in walking, Not elsewhere classified (R26.2)  Inpatient: Payor: [facility] / Plan: Alberto Martinez / Product Type: *No Product type* /     ASSESSMENT:     REHAB RECOMMENDATIONS:   Recommendation to date pending progress:  Setting:  Home Health Therapy    Equipment:    To Be Determined     ASSESSMENT:  Ms. Teresa Aleman presented to the hospital with generalized weakness. Pt is oriented x4 but demonstrated confusion throughout evaluation along with perseverated during conversation. At baseline, pt reported he is independent for her ADLs. Ambulates with a RW.  Pt stated she wears \"a lot\" of oxygen at home but unable to report how many liters (on 7L HFNC today). Today, pt was received supine in bed. Pt soiled in BM and urine. Completed bed mobility with CGA. Sit>stand and SPT to chair CGA-Marcelle. Required extended period of time for antoine-care in standing with maxA and multiple seated rest breaks. Pt's HR increased to 140s-150s with limited mobility--RN in room and reported it has been elevated. Pt persevered throughout session on \"I dont want children. \" Required multimodal cues throughout session in order to remain on task and safely perform mobility/ADLs. Recommend pt return home with [facility] therapy service as long as pt has family support available to assist her if her cognition does not improve. Rita Turner currently demonstrates overall deficits in strength, balance, activity tolerance, and ADL performance. Patient would benefit from skilled OT services at this time in order to address functional deficits and OT goals stated above. [address] [facility] [address] [id number] [facility] Clicks Daily Activity Inpatient Short Form:    AM-PAC Daily Activity - Inpatient   How much help is needed for putting on and taking off regular lower body clothing?: A Little  How much help is needed for bathing (which includes washing, rinsing, drying)?: A Little  How much help is needed for toileting (which includes using toilet, bedpan, or urinal)?: A Lot  How much help is needed for putting on and taking off regular upper body clothing?: A Little  How much help is needed for taking care of personal grooming?: A Little  How much help for eating meals?: None  [facility] Inpatient Daily Activity Raw Score: 18  AM-PAC Inpatient ADL T-Scale Score : 38.66  ADL Inpatient CMS 0-100% Score: 46.65  ADL Inpatient CMS G-Code Modifier : CK     SUBJECTIVE:     Ms. [facility], \"I dont want children. \"     Social/Functional Lives With: Parent  Type of Home: House  Home Layout: One level  Home Equipment: roger Velasco  Receives Help From: Family  ADL Assistance: Needs assistance  Homemaking Responsibilities: Yes  Ambulation Assistance: Needs assistance  Transfer Assistance: Independent  Active : No    OBJECTIVE:     Ayse Salas / Jl Moore / Aron Nikki: NA    RESTRICTIONS/PRECAUTIONS:  Restrictions/Precautions: Fall Risk    PAIN: VITALS / O2:   Pre Treatment:   Pain Assessment: None - Denies Pain      Post Treatment: no change        Vitals          Oxygen   7L HFNC         GROSS EVALUATION: INTACT IMPAIRED   (See Comments)   UE AROM [x] []   UE PROM [x] []   Strength []  Functional for bADLs, generalized weakness noted throughout      Posture / Balance []  Fair+ sitting, fair standing    Sensation [x]     Coordination []  Generally decreased      Tone [x]       Edema [x]    Activity Tolerance []  Frequent seated rest breaks required during session      Hand Dominance R [] L []      COGNITION/  PERCEPTION: INTACT IMPAIRED   (See Comments)   Orientation [x]  Though confused throughout session   Vision [x]     Hearing [x]     Cognition  []  Confused, simple, one-step, commands with multimodal cueing, perseverates during conversation    Perception []       MOBILITY: I Mod I S SBA CGA Min Mod Max Total  NT x2 Comments:   Bed Mobility    Rolling [] [] [] [] [] [] [] [] [] [x] []    Supine to Sit [] [] [] [] [x] [] [] [] [] [] []    Scooting [] [] [] [] [x] [] [] [] [] [] []    Sit to Supine [] [] [] [] [] [] [] [] [] [x] [] Left sitting in the chair    Transfers    Sit to Stand [] [] [] [] [x] [] [] [] [] [] []    Bed to Chair [] [] [] [] [] [x] [] [] [] [] [] Attempted to sit [facility] during transfer, max verbal and tactile cues to safely sit in the chair    Stand to Sit [] [] [] [] [] [x] [] [] [] [] []    Tub/Shower [] [] [] [] [] [] [] [] [] [x] []     Toilet [] [] [] [] [] [] [] [] [] [x] []      [] [] [] [] [] [] [] [] [] [] []    I=Independent, Mod I=Modified Independent, S=Supervision/Setup, SBA=Standby Assistance, CGA=Contact Guard Assistance, Min=Minimal Assistance, Mod=Moderate Assistance, Max=Maximal Assistance, Total=Total Assistance, NT=Not Tested    ACTIVITIES OF DAILY LIVING: I Mod I S SBA CGA Min Mod Max Total NT Comments   BASIC ADLs:              Upper Body Bathing  [] [] [] [] [] [] [] [] [] [x]    Lower Body Bathing [] [] [] [] [] [] [] [] [] [x]    Toileting [] [] [] [] [] [] [] [x] [] [] Soiled in urine and BM, maxA for antoine-care and brief management, extended time required    Upper Body Dressing [] [] [] [] [] [x] [] [] [] [] gown   Lower Body Dressing [] [] [] [] [] [x] [] [] [] [] Socks    Feeding [] [] [] [] [] [] [] [] [] [x]    Grooming [] [] [] [] [x] [] [] [] [] [] Washed hands/face in sitting    Personal Device Care [] [] [] [] [] [] [] [] [] [x]    Functional Mobility [] [] [] [] [x] [x] [] [] [] [] RW   I=Independent, Mod I=Modified Independent, S=Supervision/Setup, SBA=Standby Assistance, CGA=Contact Guard Assistance, Min=Minimal Assistance, Mod=Moderate Assistance, Max=Maximal Assistance, Total=Total Assistance, NT=Not Tested    PLAN:   FREQUENCY/DURATION   OT Plan of Care: 3 times/week for duration of hospital stay or until stated goals are met, whichever comes first.    PROBLEM LIST:   (Skilled intervention is medically necessary to address:)  Decreased ADL/Functional Activities  Decreased Activity Tolerance  Decreased Balance  Decreased Cognition  Decreased Coordination  Decreased Safety Awareness  Decreased Strength  Decreased Transfer Abilities   INTERVENTIONS PLANNED:  (Benefits and precautions of occupational therapy have been discussed with the patient.)  Self Care Training  Therapeutic Activity  Therapeutic Exercise/HEP  Neuromuscular Re-education  Manual Therapy  Education         TREATMENT:     EVALUATION: LOW COMPLEXITY: (Untimed Charge)    TREATMENT:   Co-Treatment PT/OT necessary due to patient's decreased overall endurance/tolerance levels, as well as need for high level skilled assistance to complete functional transfers/mobility and functional tasks  Self Care (30 minutes): Patient participated in toileting, upper body dressing, lower body dressing, and grooming ADLs in unsupported sitting and standing with maximal verbal and tactile cueing to increase independence, decrease assistance required, increase activity tolerance, and increase safety awareness. Patient also participated in functional mobility and functional transfer training to increase independence, decrease assistance required, increase activity tolerance, and increase safety awareness. TREATMENT GRID:  N/A    AFTER TREATMENT PRECAUTIONS: Alarm Activated, Call light within reach, Chair, Needs within reach, and RN at bedside    INTERDISCIPLINARY COLLABORATION:  RN/ PCT, PT/ PTA, and OT/ SUN    EDUCATION:  Education Given To: Patient  Education Provided: Role of Therapy;Plan of Care; Fall Prevention Strategies  Barriers to Learning: Cognition  Education Outcome: Continued education needed    TOTAL TREATMENT DURATION AND TIME:  Time In: 1000  Time Out: [address]  Minutes: Ekaterina Gill

## 2023-02-03 ENCOUNTER — APPOINTMENT (OUTPATIENT)
Dept: GENERAL RADIOLOGY | Age: 53
DRG: 871 | End: 2023-02-03
Payer: MEDICARE

## 2023-02-03 ENCOUNTER — APPOINTMENT (OUTPATIENT)
Dept: NON INVASIVE DIAGNOSTICS | Age: 53
DRG: 871 | End: 2023-02-03
Payer: MEDICARE

## 2023-02-03 LAB
ANION GAP SERPL CALC-SCNC: 7 MMOL/L (ref 2–11)
BASOPHILS # BLD: 0.1 K/UL (ref 0–0.2)
BASOPHILS NFR BLD: 1 % (ref 0–2)
BUN SERPL-MCNC: 14 MG/DL (ref 6–23)
CALCIUM SERPL-MCNC: 8.8 MG/DL (ref 8.3–10.4)
CHLORIDE SERPL-SCNC: 108 MMOL/L (ref 101–110)
CO2 SERPL-SCNC: 27 MMOL/L (ref 21–32)
CREAT SERPL-MCNC: 0.7 MG/DL (ref 0.6–1)
DIFFERENTIAL METHOD BLD: ABNORMAL
ECHO AO ASC DIAM: 3.2 CM
ECHO AO ASCENDING AORTA INDEX: 1.36 CM/M2
ECHO AO ROOT DIAM: 2.7 CM
ECHO AO ROOT INDEX: 1.14 CM/M2
ECHO AV AREA PEAK VELOCITY: 1.8 CM2
ECHO AV AREA VTI: 1.8 CM2
ECHO AV AREA/BSA PEAK VELOCITY: 0.8 CM2/M2
ECHO AV AREA/BSA VTI: 0.8 CM2/M2
ECHO AV MEAN GRADIENT: 9 MMHG
ECHO AV MEAN VELOCITY: 1.4 M/S
ECHO AV PEAK GRADIENT: 18 MMHG
ECHO AV PEAK VELOCITY: 2.1 M/S
ECHO AV VELOCITY RATIO: 0.71
ECHO AV VTI: 38.3 CM
ECHO BSA: 2.56 M2
ECHO LA AREA 2C: 14.4 CM2
ECHO LA AREA 4C: 14.2 CM2
ECHO LA DIAMETER INDEX: 1.86 CM/M2
ECHO LA DIAMETER: 4.4 CM
ECHO LA MAJOR AXIS: 5.2 CM
ECHO LA MINOR AXIS: 5.1 CM
ECHO LA TO AORTIC ROOT RATIO: 1.63
ECHO LA VOL 2C: 33 ML (ref 22–52)
ECHO LA VOL 4C: 31 ML (ref 22–52)
ECHO LA VOL BP: 32 ML (ref 22–52)
ECHO LA VOL/BSA BIPLANE: 14 ML/M2 (ref 16–34)
ECHO LA VOLUME INDEX A2C: 14 ML/M2 (ref 16–34)
ECHO LA VOLUME INDEX A4C: 13 ML/M2 (ref 16–34)
ECHO LV E' LATERAL VELOCITY: 10 CM/S
ECHO LV E' SEPTAL VELOCITY: 11 CM/S
ECHO LV EDV A2C: 98 ML
ECHO LV EDV A4C: 116 ML
ECHO LV EDV INDEX A4C: 49 ML/M2
ECHO LV EDV NDEX A2C: 42 ML/M2
ECHO LV EJECTION FRACTION A2C: 65 %
ECHO LV EJECTION FRACTION A4C: 65 %
ECHO LV EJECTION FRACTION BIPLANE: 65 % (ref 55–100)
ECHO LV ESV A2C: 35 ML
ECHO LV ESV A4C: 41 ML
ECHO LV ESV INDEX A2C: 15 ML/M2
ECHO LV ESV INDEX A4C: 17 ML/M2
ECHO LV FRACTIONAL SHORTENING: 32 % (ref 28–44)
ECHO LV INTERNAL DIMENSION DIASTOLE INDEX: 1.74 CM/M2
ECHO LV INTERNAL DIMENSION DIASTOLIC: 4.1 CM (ref 3.9–5.3)
ECHO LV INTERNAL DIMENSION SYSTOLIC INDEX: 1.19 CM/M2
ECHO LV INTERNAL DIMENSION SYSTOLIC: 2.8 CM
ECHO LV IVSD: 1 CM (ref 0.6–0.9)
ECHO LV MASS 2D: 114.1 G (ref 67–162)
ECHO LV MASS INDEX 2D: 48.4 G/M2 (ref 43–95)
ECHO LV POSTERIOR WALL DIASTOLIC: 0.8 CM (ref 0.6–0.9)
ECHO LV RELATIVE WALL THICKNESS RATIO: 0.39
ECHO LVOT AREA: 2.5 CM2
ECHO LVOT AV VTI INDEX: 0.72
ECHO LVOT DIAM: 1.8 CM
ECHO LVOT MEAN GRADIENT: 4 MMHG
ECHO LVOT PEAK GRADIENT: 9 MMHG
ECHO LVOT PEAK VELOCITY: 1.5 M/S
ECHO LVOT STROKE VOLUME INDEX: 29.5 ML/M2
ECHO LVOT SV: 69.7 ML
ECHO LVOT VTI: 27.4 CM
ECHO MV A VELOCITY: 0.94 M/S
ECHO MV AREA VTI: 2.6 CM2
ECHO MV E DECELERATION TIME (DT): 213 MS
ECHO MV E VELOCITY: 0.77 M/S
ECHO MV E/A RATIO: 0.82
ECHO MV E/E' LATERAL: 7.7
ECHO MV E/E' RATIO (AVERAGED): 7.35
ECHO MV E/E' SEPTAL: 7
ECHO MV LVOT VTI INDEX: 0.96
ECHO MV MAX VELOCITY: 1.4 M/S
ECHO MV MEAN GRADIENT: 5 MMHG
ECHO MV MEAN VELOCITY: 1 M/S
ECHO MV PEAK GRADIENT: 8 MMHG
ECHO MV VTI: 26.3 CM
ECHO PV ACCELERATION TIME (AT): 114 MS
ECHO PV MAX VELOCITY: 1.3 M/S
ECHO PV PEAK GRADIENT: 7 MMHG
ECHO RV BASAL DIMENSION: 3.6 CM
ECHO RV FREE WALL PEAK S': 14 CM/S
ECHO RV INTERNAL DIMENSION: 3 CM
ECHO RV TAPSE: 2.4 CM (ref 1.7–?)
ECHO TV REGURGITANT MAX VELOCITY: 2.5 M/S
ECHO TV REGURGITANT PEAK GRADIENT: 25 MMHG
EOSINOPHIL # BLD: 0.1 K/UL (ref 0–0.8)
EOSINOPHIL NFR BLD: 2 % (ref 0.5–7.8)
ERYTHROCYTE [DISTWIDTH] IN BLOOD BY AUTOMATED COUNT: 14.5 % (ref 11.9–14.6)
GLUCOSE SERPL-MCNC: 93 MG/DL (ref 65–100)
HCT VFR BLD AUTO: 36.3 % (ref 35.8–46.3)
HGB BLD-MCNC: 11.6 G/DL (ref 11.7–15.4)
IMM GRANULOCYTES # BLD AUTO: 0.4 K/UL (ref 0–0.5)
IMM GRANULOCYTES NFR BLD AUTO: 5 % (ref 0–5)
LV EF: 65 %
LVEF MODALITY: ABNORMAL
LYMPHOCYTES # BLD: 1 K/UL (ref 0.5–4.6)
LYMPHOCYTES NFR BLD: 14 % (ref 13–44)
MCH RBC QN AUTO: 29.1 PG (ref 26.1–32.9)
MCHC RBC AUTO-ENTMCNC: 32 G/DL (ref 31.4–35)
MCV RBC AUTO: 91.2 FL (ref 82–102)
MONOCYTES # BLD: 1 K/UL (ref 0.1–1.3)
MONOCYTES NFR BLD: 14 % (ref 4–12)
NEUTS SEG # BLD: 4.6 K/UL (ref 1.7–8.2)
NEUTS SEG NFR BLD: 64 % (ref 43–78)
NRBC # BLD: 0 K/UL (ref 0–0.2)
NT PRO BNP: 919 PG/ML (ref 5–125)
PLATELET # BLD AUTO: 606 K/UL (ref 150–450)
PMV BLD AUTO: 9.4 FL (ref 9.4–12.3)
POTASSIUM SERPL-SCNC: 4.4 MMOL/L (ref 3.5–5.1)
RBC # BLD AUTO: 3.98 M/UL (ref 4.05–5.2)
SODIUM SERPL-SCNC: 142 MMOL/L (ref 133–143)
WBC # BLD AUTO: 7.2 K/UL (ref 4.3–11.1)

## 2023-02-03 PROCEDURE — 2580000003 HC RX 258: Performed by: INTERNAL MEDICINE

## 2023-02-03 PROCEDURE — 6370000000 HC RX 637 (ALT 250 FOR IP): Performed by: NURSE PRACTITIONER

## 2023-02-03 PROCEDURE — 1100000000 HC RM PRIVATE

## 2023-02-03 PROCEDURE — 83880 ASSAY OF NATRIURETIC PEPTIDE: CPT

## 2023-02-03 PROCEDURE — 6360000002 HC RX W HCPCS: Performed by: INTERNAL MEDICINE

## 2023-02-03 PROCEDURE — 36415 COLL VENOUS BLD VENIPUNCTURE: CPT

## 2023-02-03 PROCEDURE — C8929 TTE W OR WO FOL WCON,DOPPLER: HCPCS

## 2023-02-03 PROCEDURE — 6360000004 HC RX CONTRAST MEDICATION: Performed by: INTERNAL MEDICINE

## 2023-02-03 PROCEDURE — 97530 THERAPEUTIC ACTIVITIES: CPT

## 2023-02-03 PROCEDURE — 71045 X-RAY EXAM CHEST 1 VIEW: CPT

## 2023-02-03 PROCEDURE — 6370000000 HC RX 637 (ALT 250 FOR IP): Performed by: INTERNAL MEDICINE

## 2023-02-03 PROCEDURE — 6360000002 HC RX W HCPCS: Performed by: FAMILY MEDICINE

## 2023-02-03 PROCEDURE — A4216 STERILE WATER/SALINE, 10 ML: HCPCS | Performed by: INTERNAL MEDICINE

## 2023-02-03 PROCEDURE — 2700000000 HC OXYGEN THERAPY PER DAY

## 2023-02-03 PROCEDURE — 99231 SBSQ HOSP IP/OBS SF/LOW 25: CPT | Performed by: INTERNAL MEDICINE

## 2023-02-03 PROCEDURE — 2580000003 HC RX 258: Performed by: FAMILY MEDICINE

## 2023-02-03 PROCEDURE — 97535 SELF CARE MNGMENT TRAINING: CPT

## 2023-02-03 PROCEDURE — 85025 COMPLETE CBC W/AUTO DIFF WBC: CPT

## 2023-02-03 PROCEDURE — 93306 TTE W/DOPPLER COMPLETE: CPT | Performed by: INTERNAL MEDICINE

## 2023-02-03 PROCEDURE — 80048 BASIC METABOLIC PNL TOTAL CA: CPT

## 2023-02-03 PROCEDURE — 94761 N-INVAS EAR/PLS OXIMETRY MLT: CPT

## 2023-02-03 PROCEDURE — 94640 AIRWAY INHALATION TREATMENT: CPT

## 2023-02-03 RX ADMIN — IPRATROPIUM BROMIDE AND ALBUTEROL SULFATE 1 AMPULE: 2.5; .5 SOLUTION RESPIRATORY (INHALATION) at 20:28

## 2023-02-03 RX ADMIN — BUDESONIDE 500 MCG: 0.5 INHALANT RESPIRATORY (INHALATION) at 20:27

## 2023-02-03 RX ADMIN — METOPROLOL TARTRATE 25 MG: 25 TABLET, FILM COATED ORAL at 21:06

## 2023-02-03 RX ADMIN — ESCITALOPRAM OXALATE 20 MG: 10 TABLET, FILM COATED ORAL at 09:45

## 2023-02-03 RX ADMIN — DOXEPIN HYDROCHLORIDE 100 MG: 50 CAPSULE ORAL at 21:07

## 2023-02-03 RX ADMIN — CEFEPIME 2000 MG: 2 INJECTION, POWDER, FOR SOLUTION INTRAVENOUS at 06:12

## 2023-02-03 RX ADMIN — IPRATROPIUM BROMIDE AND ALBUTEROL SULFATE 1 AMPULE: 2.5; .5 SOLUTION RESPIRATORY (INHALATION) at 08:08

## 2023-02-03 RX ADMIN — SODIUM CHLORIDE, PRESERVATIVE FREE 10 ML: 5 INJECTION INTRAVENOUS at 21:08

## 2023-02-03 RX ADMIN — PERFLUTREN 0.45 ML: 6.52 INJECTION, SUSPENSION INTRAVENOUS at 09:15

## 2023-02-03 RX ADMIN — CEFEPIME 2000 MG: 2 INJECTION, POWDER, FOR SOLUTION INTRAVENOUS at 21:06

## 2023-02-03 RX ADMIN — DULOXETINE HYDROCHLORIDE 60 MG: 60 CAPSULE, DELAYED RELEASE ORAL at 09:45

## 2023-02-03 RX ADMIN — BUDESONIDE 500 MCG: 0.5 INHALANT RESPIRATORY (INHALATION) at 08:09

## 2023-02-03 RX ADMIN — IPRATROPIUM BROMIDE AND ALBUTEROL SULFATE 1 AMPULE: 2.5; .5 SOLUTION RESPIRATORY (INHALATION) at 11:46

## 2023-02-03 RX ADMIN — SODIUM CHLORIDE, PRESERVATIVE FREE 10 ML: 5 INJECTION INTRAVENOUS at 09:50

## 2023-02-03 RX ADMIN — CEFEPIME 2000 MG: 2 INJECTION, POWDER, FOR SOLUTION INTRAVENOUS at 13:48

## 2023-02-03 RX ADMIN — IPRATROPIUM BROMIDE AND ALBUTEROL SULFATE 1 AMPULE: 2.5; .5 SOLUTION RESPIRATORY (INHALATION) at 16:05

## 2023-02-03 RX ADMIN — ENOXAPARIN SODIUM 30 MG: 100 INJECTION SUBCUTANEOUS at 21:07

## 2023-02-03 RX ADMIN — ENOXAPARIN SODIUM 30 MG: 100 INJECTION SUBCUTANEOUS at 09:44

## 2023-02-03 RX ADMIN — METOPROLOL TARTRATE 25 MG: 25 TABLET, FILM COATED ORAL at 09:45

## 2023-02-03 RX ADMIN — OLANZAPINE 20 MG: 5 TABLET, FILM COATED ORAL at 21:06

## 2023-02-03 NOTE — PROGRESS NOTES
ACUTE OCCUPATIONAL THERAPY GOALS:   (Developed with and agreed upon by patient and/or caregiver.)  1. Patient will complete lower body bathing and dressing with SUPERVISION and adaptive equipment as needed. Progressing 2/3/2023  2. Patient will complete upper body bathing and dressing with SUPERVISION and adaptive equipment as needed. Progressing 2/3/2023  3. Patient will complete toileting with MIN A.   4. Patient will tolerate 30 minutes of OT treatment with 1-2 rest breaks to increase activity tolerance for ADLs. Progressing 2/3/2023  5. Patient will complete functional transfers with SUPERVISION and adaptive equipment as needed. Progressing 2/3/2023  6. Patient will complete simple grooming task sitting unsupported with SUPERVISION. Progressing 2/3/2023     Timeframe: 7 visits      OCCUPATIONAL THERAPY: Daily Note PM   OT Visit Days: 2   Time In/Out  OT Charge Capture  Rehab Caseload Tracker  OT Orders    Ashley Macias is a 45 y.o. female   PRIMARY DIAGNOSIS: Severe sepsis ([address]  Dehydration [E86.0]  Acute respiratory failure with hypoxia (HCC) [J96.01]  Sepsis ([address] [A41.9]  Acute renal failure, unspecified acute renal failure type ([address] [N17.9]  Other fatigue [R53.83]  Pneumonia of both lungs due to infectious organism, unspecified part of lung [J18.9]       Inpatient: Payor: [facility] MEDICARE / Plan: Paulette Leon / Product Type: *No Product type* /     ASSESSMENT:     REHAB RECOMMENDATIONS: CURRENT LEVEL OF FUNCTION:  (Most Recently Demonstrated)   Recommendation to date pending progress:  [address] [place] [address]  with mom support    Equipment:    To Be Determined  Has RW Bathing:  Minimal Assist  Dressing:  Minimal Assist  Feeding/Grooming:  Supervision/Setup  Toileting: Total Assist  Functional Mobility:  Contact Guard Assist     ASSESSMENT:  Ms. Moyer Adjutant completed sponge bath and hair washing in supine and while sitting on EOB as below in ADL grid.  RN alerted to need for bariatric BSC since pt can be up and mobile instead of using purewick that is not fitting well. Pt is getting skin breakdown in multiple places. CGA up to edge of bed. CGA to RW and TFd to recliner. O2 sats dropping on 5L NC O2. Increased to 7L and remained >90%. Once up and sitting, lowered to 5L and >90%. Pt could not tell me if she wore O2 at home. Left with PTA doing LE exercises. Pt is making good progress towards goals. Continue OT efforts and POC. Resume HH OT and back home with mom for support. SUBJECTIVE:     Ms. Nara Paredes states, \"Do you like tatoos? \"     Social/Functional Lives With: Parent  Type of Home: House  Home Layout: One level  Home Equipment: Walker, standard  Receives Help From: Family  ADL Assistance: Needs assistance  Homemaking Responsibilities: Yes  Ambulation Assistance: Needs assistance  Transfer Assistance: Independent  Active : No    OBJECTIVE:     Rm Kong / Laila Green / AIRW[employer]: Continuous Pulse Oximetry, IV, Purewick, and Telemetry     RESTRICTIONS/PRECAUTIONS:  Restrictions/Precautions  Restrictions/Precautions: Fall Risk    PAIN: VITALS / O2:   Pre Treatment: no complaint of pain           Post Treatment: no complaint of pain Vitals stable         Oxygen fluctuating b/w 5-7 L NC here, sats >90%, unsure if pt wears O2 at baseline.          MOBILITY: I Mod I S SBA CGA Min Mod Max Total  NT x2 Comments:   Bed Mobility    Rolling [] [] [] [] [x] [] [] [] [] [] []    Supine to Sit [] [] [] [] [x] [] [] [] [] [] []    Scooting [] [] [] [] [x] [] [] [] [] [] []    Sit to Supine [] [] [] [] [] [] [] [] [] [] [] Left up in recliner   Transfers    Sit to Stand [] [] [] [] [x] [] [] [] [] [] [] With RW   Bed to Chair [] [] [] [] [x] [] [] [] [] [] [] With RW   Stand to Sit [] [] [] [] [x] [] [] [] [] [] []    Tub/Shower [] [] [] [] [] [] [] [] [] [x] []     Toilet [] [] [] [] [] [] [] [] [] [x] []  Needs bariatric BSC    [] [] [] [] [] [] [] [] [] [] []    I=Independent, Mod I=Modified Independent, S=Supervision/Setup, SBA=Standby Assistance, CGA=Contact Guard Assistance, Min=Minimal Assistance, Mod=Moderate Assistance, Max=Maximal Assistance, Total=Total Assistance, NT=Not Tested    ACTIVITIES OF DAILY LIVING: I Mod I S SBA CGA Min Mod Max Total NT Comments   BASIC ADLs:              Upper Body   Bathing [] [] [] [x] [] [] [] [] [] [] Verbal cues needed supine and sitting edge of bed   Lower Body Bathing [] [] [] [] [] [] [] [x] [] [] By OT below knees and feet, pt di upper thighs   Toileting [] [] [] [] [] [] [] [] [x] [] Purewick in place   Upper Body Dressing [] [] [] [x] [] [] [] [] [] []    Lower Body Dressing [] [] [] [] [] [] [] [] [x] [] socks   Feeding [] [] [] [x] [] [] [] [] [] []    Grooming [] [] [] [] [] [x] [] [] [] [] Sitting on edge of bed   Personal Device Care [] [] [] [] [] [] [] [] [] []    Functional Mobility [] [] [] [] [x] [] [] [] [] [] With RW   I=Independent, Mod I=Modified Independent, S=Supervision/Setup, SBA=Standby Assistance, CGA=Contact Guard Assistance, Min=Minimal Assistance, Mod=Moderate Assistance, Max=Maximal Assistance, Total=Total Assistance, NT=Not Tested    BALANCE: Good Fair+ Fair Fair- Poor NT Comments   Sitting Static [x] [] [] [] [] []    Sitting Dynamic [] [x] [] [] [] []              Standing Static [] [] [x] [] [] []    Standing Dynamic [] [] [x] [] [] []        PLAN:     FREQUENCY/DURATION   OT Plan of Care: 3 times/week for duration of hospital stay or until stated goals are met, whichever comes first.    TREATMENT:     TREATMENT:   Therapeutic Activity (15 Minutes): Patient participated in therapeutic activities including bed mobility training, functional transfer training, toilet transfer training, shower transfer training, adaptive equipment training, functional mobility of household distances, sitting tolerance activity, standing tolerance activity, and fine motor skill activity with minimal assistance, verbal cues, tactile cues, visual cues, education, adaptive equipment, and frequent redirection to tasks in order to increase independence, increase safety awareness, increase activity tolerance, increase coordination, decrease assistance required, prepare for functional activity, prepare for discharge home, and prepare for functional transfers. Self Care (48 minutes): Patient participated in upper body bathing, lower body bathing, toileting, upper body dressing, lower body dressing, self feeding, and grooming ADLs in supported sitting, unsupported sitting, standing, and supine with minimal visual, verbal, manual, tactile, and and max physical assist for LB with constant verbal cueing to increase independence, decrease assistance required, increase activity tolerance, increase safety awareness, and maintain precautions. Patient also participated in functional mobility, functional transfer, energy conservation, and adaptive equipment training to increase independence, decrease assistance required, increase activity tolerance, increase safety awareness, and maintain precautions. TREATMENT GRID:  N/A    AFTER TREATMENT PRECAUTIONS: Alarm Activated, Bed/Chair Locked, Call light within reach, Chair, Heels floated, Needs within reach, RN notified, and working with PTA on LB exercises    INTERDISCIPLINARY COLLABORATION:  RN/ PCT, PT/ PTA, OT/ SUN, and RN Case Manager/      EDUCATION:  Education Given To: Patient;Staff  Education Provided: Role of Therapy;Plan of Care;Home Exercise Program;Precautions; ADL Adaptive Strategies;Transfer Training;Energy Conservation;Orientation; Family Education;Equipment; Fall Prevention Strategies  Education Method: Demonstration;Verbal  Barriers to Learning: Cognition  Education Outcome: Continued education needed    TOTAL TREATMENT DURATION AND TIME:  Time In: 8436  Time Out: [place]  Minutes: 61    MAX PACE, OT    Max Pace MS, OTR/L

## 2023-02-03 NOTE — PROGRESS NOTES
ACUTE PHYSICAL THERAPY GOALS:   (Developed with and agreed upon by patient and/or caregiver.)  STG:  (1.)Ms. Chema Delgado will move from supine to sit and sit to supine , scoot up and down, and roll side to side with STAND BY ASSIST within 4 treatment day(s). (2.)Ms. Chema Delgado will transfer from bed to chair and chair to bed with STAND BY ASSIST using the least restrictive device within 4 treatment day(s). (3.)Ms. Chema Delgado will ambulate with STAND BY ASSIST for 25 feet with the least restrictive device within 4 treatment day(s). LTG:  (1.)Ms. Chema Delgado will move from supine to sit and sit to supine , scoot up and down, and roll side to side in bed with MODIFIED INDEPENDENCE within 7 treatment day(s). (2.)Ms. Chema Delgado will transfer from bed to chair and chair to bed with MODIFIED INDEPENDENCE using the least restrictive device within 7 treatment day(s). (3.)Ms. Chema Delgado will ambulate with MODIFIED INDEPENDENCE for 100 feet with the least restrictive device within 7 treatment day(s). PHYSICAL THERAPY: Daily Note PM   (Link to Caseload Tracking: PT Visit Days : 3  Time In/Out PT Charge Capture  Rehab Caseload Tracker  Orders    Pushpa Ng is a 45 y.o. female   PRIMARY DIAGNOSIS: Severe sepsis ([address]  Dehydration [E86.0]  Acute respiratory failure with hypoxia (HCC) [J96.01]  Sepsis ([address] [A41.9]  Acute renal failure, unspecified acute renal failure type ([address] [N17.9]  Other fatigue [R53.83]  Pneumonia of both lungs due to infectious organism, unspecified part of lung [J18.9]       Inpatient: Payor: [facility] MEDICARE / Plan: [employer] DUAL COMPLETE / Product Type: *No Product type* /     ASSESSMENT:     REHAB RECOMMENDATIONS:   Recommendation to date pending progress:  Setting:  Home Health Therapy    Equipment:    None     ASSESSMENT:  Ms. Chema Delgado is supine having just bathed with OT. She sat up without assistance and stood int the walker with SBA.   She walked a few feet to the chair then performed seated exercises below.  Moving ok. Sats 95% on 5L with this activity. SUBJECTIVE:   Ms. Nara Paredes states, \"do you like tatoos? \"     Social/Functional Lives With: Parent  Type of Home: House  Home Layout: One level  Home Equipment: Walker, standard  Receives Help From: Family  ADL Assistance: Needs assistance  Homemaking Responsibilities: Yes  Ambulation Assistance: Needs assistance  Transfer Assistance: Independent  Active : No  OBJECTIVE:     PAIN: Siva Sames / O2: Yovanny Easton / Al Dilan / DRAINS:   Pre Treatment: 0/10         Post Treatment: 0/10 Vitals        Oxygen   7L HFNC Continuous Pulse Oximetry and Purewick    RESTRICTIONS/PRECAUTIONS:  Restrictions/Precautions  Restrictions/Precautions: Fall Risk  Restrictions/Precautions: Fall Risk     MOBILITY: I Mod I S SBA CGA Min Mod Max Total  NT x2 Comments:   Bed Mobility    Rolling [] [] [] [] [] [] [] [] [] [] []    Supine to Sit [] [x] [] [] [] [] [] [] [] [] []    Scooting [] [x] [] [] [] [] [] [] [] [] []    Sit to Supine [] [] [] [] [] [] [] [] [] [] []    Transfers    Sit to Stand [] [] [] [] [x] [] [] [] [] [] []    Bed to Chair [] [] [] [] [x] [] [] [] [] [] []    Stand to Sit [] [] [] [] [x] [] [] [] [] [] []     [] [] [] [] [] [] [] [] [] [] []    I=Independent, Mod I=Modified Independent, S=Supervision, SBA=Standby Assistance, CGA=Contact Guard Assistance,   Min=Minimal Assistance, Mod=Moderate Assistance, Max=Maximal Assistance, Total=Total Assistance, NT=Not Tested    BALANCE: Good Fair+ Fair Fair- Poor NT Comments   Sitting Static [x] [] [] [] [] []    Sitting Dynamic [] [x] [] [] [] []              Standing Static [] [x] [] [] [] []    Standing Dynamic [] [x] [x] [] [] []      GAIT: I Mod I S SBA CGA Min Mod Max Total  NT x2 Comments:   Level of Assistance [] [] [] [] [x] [] [] [] [] [] []    Distance   5'x1    DME Rolling Walker    Gait Quality Decreased leon , Decreased step clearance, Decreased step length, Shuffling , Trunk sway increased, and Wide base of support    Weightbearing Status      Stairs      I=Independent, Mod I=Modified Independent, S=Supervision, SBA=Standby Assistance, CGA=Contact Guard Assistance,   Min=Minimal Assistance, Mod=Moderate Assistance, Max=Maximal Assistance, Total=Total Assistance, NT=Not Tested    PLAN:   FREQUENCY AND DURATION: 3 times/week for duration of hospital stay or until stated goals are met, whichever comes first.    TREATMENT:   TREATMENT:   Therapeutic Activity (15 Minutes): Therapeutic activity included Supine to Sit, Scooting, Transfer Training, Ambulation on level ground, Sitting balance , Standing balance, and below leg exercises to improve functional Activity tolerance, Balance, Mobility, and Strength.    TREATMENT GRID:   Date:  2/3 Date:   Date:     Activity/Exercise Parameters Parameters Parameters   TKE 10x B     marching 10x B     Hip abd 10x B     AP 10x B                           AFTER TREATMENT PRECAUTIONS: Bed/Chair Locked, Call light within reach, Chair, Needs within reach, RN at bedside, and RN notified    INTERDISCIPLINARY COLLABORATION:  RN/ PCT, PT/ PTA, and OT/ SUN    EDUCATION:      TIME IN/OUT:  Time In: 1440  Time Out: 1455  Minutes: 15    Kim Prince PTA

## 2023-02-03 NOTE — PROGRESS NOTES
Comprehensive Nutrition Assessment    Type and Reason for Visit: Initial, RD Nutrition Re-Screen/LOS  Length of Stay    Nutrition Recommendations/Plan:   Continue current diet      Malnutrition Assessment:  Malnutrition Status: Insufficient data  No martín signs of fat or muscle wasting noted    Nutrition Assessment:  Nutrition History: Pt unable to give nutrition hx r/t mentation. Do You Have Any Cultural, [employer]ybeterian, or Ethnic Food Preferences?: No   Nutrition Background:    Pt with PMH significant for schizophrenia and morbid obesity who presented to [facility] 1/26 with complaints of weakness and s/p fall. Pt admitted for sepsis. Nutrition Interval:  NPO 1/26. FLQ 1/27. ETC 1/30. Pt seen in recliner, no visitors present. Pt reports \"eating a lot of foods\" for all meals. EMR review shows recorded intake ranging from 0-100%. Pt asking when she is going home and if RD is going \"outside to smoke. \" Based on recent intake, pt adequately meeting estimated needs.      Current Nutrition Therapies:  ADULT DIET; Easy to Chew    Current Intake:   Average Meal Intake: 51-75% Average Supplements Intake: None Ordered      Anthropometric Measures:  Height: 5' 6\" (167.6 cm)  Current Body Wt: 294 lb 5 oz (133.5 kg) (2/2), Weight source: Bed Scale  BMI: 47.5, Obese Class 3 (BMI 40.0 or greater)  Admission Body Weight: 310 lb (140.6 kg) (1/26)  Ideal Body Weight (Kg) (Calculated): 59 kg (130 lbs), 226.4 %  Usual Body Wt: 325 lb (147.4 kg) (MD office appt 5/2021 ; limited weight hx), Percent weight change: -9.4       BMI Category Obese Class 3 (BMI 40.0 or greater)    Estimated Daily Nutrient Needs:  Energy (kcal/day): 9749-8362 (10-13kcal/kg) (Kcal/kg Weight used: 133.5 kg Current  Protein (g/day): 67-87 (20% estimated kcal needs) Weight Used: (Current) 133.5 kg  Fluid (ml/day):   (1 ml/kcal)    Nutrition Diagnosis:   No nutrition diagnosis at this time     Nutrition Interventions:   Food and/or Nutrient Delivery: Continue Current Diet     Coordination of Nutrition Care: Continue to monitor while inpatient       Goals:       Active Goal:  (Continue to meet estimated needs via PO intake during admission)       Nutrition Monitoring and Evaluation:      Food/Nutrient Intake Outcomes: Food and Nutrient Intake  Physical Signs/Symptoms Outcomes: Meal Time Behavior, Weight    Discharge Planning:    Continue current diet    Kyung Chang RD, LD  Contact: [phone]

## 2023-02-03 NOTE — PROGRESS NOTES
Hospitalist Progress Note   Admit Date:  2023 12:56 PM   Name:  Jessica Agosto   Age:  45 y.o. Sex:  female  :  1970   MRN:  [id number]   Room:  [facility]/    Presenting Complaint: Fatigue     Reason(s) for Admission: Dehydration [E86.0]  Acute respiratory failure with hypoxia ([address] [J96.01]  Sepsis ([address] [A41.9]  Acute renal failure, unspecified acute renal failure type ([address] [N17.9]  Other fatigue [R53.83]  Pneumonia of both lungs due to infectious organism, unspecified part of lung [J18.9]     History of Present Illness:   Jessica Agosto is a 45 y.o. female with a h/o schizophrenia and morbid obesity who presents today with complaints of weakness. Her mom is at bedside who provides the history. Patient developed a cough about 3 days ago. She had a fall yesterday and today seemed weaker and more confused. Oral intake has been reduced. Subjective fever noted today. Otherwise no chest pain, headaches, syncope, palpitations, N/V/D, abdo pain. Patient's mom, Carma Ormond, had a cough and pneumonia recently and was treated with antibiotics, though never told she had COVID or influenza. VS noted tachycardia low 100s, BP improved after IVFs. Labs with mild leukopenia, sCr 2.4 (normal baseline), procal 0.13, normal LA. ABG showed pH 7.42, CO2 34. CXR with bilateral consolidations (L>R). She was given ceftriaxone via EMS and azithromycin was added in the ER. She has been given IVFs and BP improved. Rapid COVID is negative. We are consulted for admission and further management. 2/3/2023  HR better controlled this am.  Still requiring NC oxygen    Assessment & Plan:   # Severe sepsis and acute hypoxemic respiratory failure 2/2 CAP vs viral pneumonia  Currently on NC oxygen  2/3/2023  Continue Cefepime  Will check CXR an BNP.     # Acute septic/metabolic encephalopathy   - ABG showed normal pH without hypocapnea       2/3/2023  Resolved   continue Zyprexa, Cymbalta and doxepin      #Tachycardia  Resolved, Echo wnl  Continue Metoprolol  Cardiology s/o    # EDU   - Likely sepsis and pre-renal. Normal baseline. Recheck tomorrow. Monitor urine output. Further work up if worsens or no improvement. 2/3/2023  Remains resolved      # Schizophrenia   - Managed by Dr. Patricia Bonner at [facility] (dx at age 13)      2/3/2023  Continue Zyprexa 20 mg p.o. daily nightly,Lexapro 20 mg daily, Depakene and prn Xanax      # Morbid obesity   - Complicates care. Anticipated discharge needs: Anticipate home in the next 48 to 72 hours. She lives at home with family. PT/OT recommends Home Health    diet: Continue easy to chew diet   VTE ppx: Lovenox BID  Code status: full code        Hospital Problems:  Principal Problem:    Severe sepsis ([address]  Active Problems:    Tachycardia    EDU (acute kidney injury) ([address]    Schizophrenia ([address]    Acute metabolic encephalopathy    Acute hypoxemic respiratory failure ([address]    Morbid obesity ([address]  Resolved Problems:    * No resolved hospital problems. *     Objective:   Patient Vitals for the past 24 hrs:   Temp Pulse Resp BP SpO2   02/03/23 1147 -- 92 18 -- 94 %   02/03/23 1120 98.1 °F (36.7 °C) 90 18 (!) 146/89 93 %   02/03/23 0809 -- 92 18 -- 99 %   02/03/23 0726 98.6 °F (37 °C) (!) 101 20 (!) 140/100 100 %   02/03/23 0407 98.1 °F (36.7 °C) 96 16 139/86 96 %   02/02/23 2306 98.2 °F (36.8 °C) (!) 110 18 (!) 145/90 99 %   02/02/23 1950 -- (!) 106 20 -- 97 %   02/02/23 1939 97.8 °F (36.6 °C) (!) 106 22 131/76 97 %       Oxygen Therapy  SpO2: 94 %  Pulse Oximeter Device Mode: Continuous  Pulse Oximeter Device Location: Finger  O2 Device: Nasal cannula  Skin Assessment: Clean, dry, & intact  FiO2 : 40 %  O2 Flow Rate (L/min): 5 L/min    Estimated body mass index is 47.5 kg/m² as calculated from the following:    Height as of this encounter: 5' 6\" (1.676 m). Weight as of this encounter: 294 lb 5 oz (133.5 kg).     Intake/Output Summary (Last 24 hours) at 2/3/2023 1554  Last data filed at 2/3/2023 1007  Gross per 24 hour   Intake 120 ml   Output --   Net 120 ml         Physical Exam:  Blood pressure (!) 146/89, pulse 92, temperature 98.1 °F (36.7 °C), temperature source Oral, resp. rate 18, height 5' 6\" (1.676 m), weight 294 lb 5 oz (133.5 kg), SpO2 94 %. General:    Morbidly obese. Chronically ill-appearing but overall improved  head:  Normocephalic, atraumatic. Eyes:  Sclerae appear normal.  Pupils equally round. ENT:  Nares appear normal.  Dry oral mucosa. Neck:  No restricted ROM. Trachea midline   CV:   Tachycardic  Lungs:   Bilateral rales, no wheezes or rhonchi. 15L NRB, bedside sats high 90s. Abdomen:   Soft, nontender, nondistended. Extremities: No cyanosis or clubbing. No edema. Skin:     No rashes and normal coloration. Warm and dry. Neuro:  Unable to assess. Lethargic but opens eyes and groans to voice and painful stimuli. Pupils are equal.  Psych:  Unable to fully assess.     I have personally reviewed labs and tests:  Recent Labs:  Recent Results (from the past 24 hour(s))   Basic Metabolic Panel w/ Reflex to MG    Collection Time: 02/03/23  4:00 AM   Result Value Ref Range    Sodium 142 133 - 143 mmol/L    Potassium 4.4 3.5 - 5.1 mmol/L    Chloride 108 101 - 110 mmol/L    CO2 27 21 - 32 mmol/L    Anion Gap 7 2 - 11 mmol/L    Glucose 93 65 - 100 mg/dL    BUN 14 6 - 23 MG/DL    Creatinine 0.70 0.6 - 1.0 MG/DL    Est, Glom Filt Rate >60 >60 ml/min/1.73m2    Calcium 8.8 8.3 - 10.4 MG/DL   CBC with Auto Differential    Collection Time: 02/03/23  8:34 AM   Result Value Ref Range    WBC 7.2 4.3 - 11.1 K/uL    RBC 3.98 (L) 4.05 - 5.2 M/uL    Hemoglobin 11.6 (L) 11.7 - 15.4 g/dL    Hematocrit 36.3 35.8 - 46.3 %    MCV 91.2 82 - 102 FL    MCH 29.1 26.1 - 32.9 PG    MCHC 32.0 31.4 - 35.0 g/dL    RDW 14.5 11.9 - 14.6 %    Platelets 738 (H) 721 - 450 K/uL    MPV 9.4 9.4 - 12.3 FL    nRBC 0.00 0.0 - 0.2 K/uL    Seg Neutrophils 64 43 - 78 %    Lymphocytes 14 13 - 44 %    Monocytes 14 (H) 4.0 - 12.0 %    Eosinophils % 2 0.5 - 7.8 %    Basophils 1 0.0 - 2.0 %    Immature Granulocytes 5 0.0 - 5.0 %    Segs Absolute 4.6 1.7 - 8.2 K/UL    Absolute Lymph # 1.0 0.5 - 4.6 K/UL    Absolute Mono # 1.0 0.1 - 1.3 K/UL    Absolute Eos # 0.1 0.0 - 0.8 K/UL    Basophils Absolute 0.1 0.0 - 0.2 K/UL    Absolute Immature Granulocyte 0.4 0.0 - 0.5 K/UL    Differential Type AUTOMATED     Transthoracic echocardiogram (TTE) complete with contrast, bubble, strain, and 3D PRN    Collection Time: 02/03/23  9:19 AM   Result Value Ref Range    LV EDV A2C 98 mL    LV EDV A4C 116 mL    LV ESV A2C 35 mL    LV ESV A4C 41 mL    IVSd 1.0 (A) 0.6 - 0.9 cm    LVIDd 4.1 3.9 - 5.3 cm    LVIDs 2.8 cm    LVOT Diameter 1.8 cm    LVOT Mean Gradient 4 mmHg    LVOT VTI 27.4 cm    LVOT Peak Velocity 1.5 m/s    LVOT Peak Gradient 9 mmHg    LVPWd 0.8 0.6 - 0.9 cm    LV E' Lateral Velocity 10 cm/s    LV E' Septal Velocity 11 cm/s    LV Ejection Fraction A2C 65 %    LV Ejection Fraction A4C 65 %    EF BP 65 55 - 100 %    LVOT Area 2.5 cm2    LVOT SV 69.7 ml    LA Minor Axis 5.1 cm    LA Major [place] 5.2 cm    LA Area 2C 14.4 cm2    LA Area 4C 14.2 cm2    LA Volume 2C 33 22 - 52 mL    LA Volume 4C 31 22 - 52 mL    LA Volume BP 32 22 - 52 mL    LA Diameter 4.4 cm    AV Mean Velocity 1.4 m/s    AV Mean Gradient 9 mmHg    AV VTI 38.3 cm    AV Peak Velocity 2.1 m/s    AV Peak Gradient 18 mmHg    AV Area by VTI 1.8 cm2    AV Area by Peak Velocity 1.8 cm2    Aortic Root 2.7 cm    Ascending Aorta 3.2 cm    MV E Wave Deceleration Time 213.0 ms    MV A Velocity 0.94 m/s    MV E Velocity 0.77 m/s    MV Mean Gradient 5 mmHg    MV VTI 26.3 cm    MV Mean Velocity 1.0 m/s    MV Max Velocity 1.4 m/s    MV Peak Gradient 8 mmHg    MV Area by VTI 2.6 cm2    PV .0 ms    PV Max Velocity 1.3 m/s    PV Peak Gradient 7 mmHg    RVIDd 3.0 cm    RV Basal Dimension 3.6 cm    RV Free Wall Peak S' 14 cm/s    TAPSE 2.4 1.7 cm    TR Max Velocity 2.50 m/s    TR Peak Gradient 25 mmHg    Body Surface Area 2.56 m2    Fractional Shortening 2D 32 28 - 44 %    LV ESV Index A4C 17 mL/m2    LV EDV Index A4C 49 mL/m2    LV ESV Index A2C 15 mL/m2    LV EDV Index A2C 42 mL/m2    LVIDd Index 1.74 cm/m2    LVIDs Index 1.19 cm/m2    LV RWT Ratio 0.39     LV Mass 2D 114.1 67 - 162 g    LV Mass 2D Index 48.4 43 - 95 g/m2    MV E/A 0.82     E/E' Ratio (Averaged) 7.35     E/E' Lateral 7.70     E/E' Septal 7.00     LA Volume Index BP 14 (A) 16 - 34 ml/m2    LVOT Stroke Volume Index 29.5 mL/m2    LA Volume Index 2C 14 (A) 16 - 34 mL/m2    LA Volume Index 4C 13 (A) 16 - 34 mL/m2    LA Size Index 1.86 cm/m2    LA/AO Root Ratio 1.63     Ao Root Index 1.14 cm/m2    Ascending Aorta Index 1.36 cm/m2    AV Velocity Ratio 0.71     LVOT:AV VTI Index 0.72     CHARMAINE/BSA VTI 0.8 cm2/m2    CHARMAINE/BSA Peak Velocity 0.8 cm2/m2    MV:LVOT VTI Index 0.96        I have personally reviewed imaging studies:  XR CHEST PORTABLE    Result Date: 1/26/2023  PORTABLE CHEST 1 VIEW HISTORY: Shortness of breath COMPARISON: 4/17/2021 FINDINGS: Consolidation is present throughout the left lung and in the right upper lobe. EKG leads are present. Lung volumes are diminished. Bilateral consolidation. Echocardiogram:  No results found for this or any previous visit.         Orders Placed This Encounter   Medications    azithromycin (ZITHROMAX) 500 mg in sodium chloride 0.9 % 250 mL IVPB ([id number])     Order Specific Question:   Antimicrobial Indications     Answer:   Pneumonia (CAP)    0.9 % sodium chloride bolus    ipratropium-albuterol (DUONEB) nebulizer solution 1 ampule     Order Specific Question:   Initiate RT Bronchodilator Protocol     Answer:   Yes - ED Protocol    sodium chloride flush 0.9 % injection 5-40 mL    sodium chloride flush 0.9 % injection 5-40 mL    0.9 % sodium chloride infusion    OR Linked Order Group     acetaminophen (TYLENOL) tablet 650 mg     acetaminophen (TYLENOL) suppository 650 mg    DISCONTD: cefTRIAXone (ROCEPHIN) 1,000 mg in sodium chloride 0.9 % 50 mL IVPB (mini-bag)     Order Specific Question:   Antimicrobial Indications     Answer:   Pneumonia (CAP)     Order Specific Question:   CAP duration of therapy     Answer: Other     Order Specific Question:   Other CAP Duration     Answer:   6 more days    DISCONTD: azithromycin (ZITHROMAX) 500 mg in sodium chloride 0.9 % 250 mL IVPB ([id number])     Order Specific Question:   Antimicrobial Indications     Answer:   Pneumonia (CAP)     Order Specific Question:   CAP duration of therapy     Answer:    Other     Order Specific Question:   Other CAP Duration     Answer:   6 more days    enoxaparin Sodium (LOVENOX) injection 30 mg     Order Specific Question:   Indication of Use     Answer:   Prophylaxis-DVT/PE    0.9 % sodium chloride bolus    DISCONTD: 0.9 % sodium chloride infusion    albuterol (PROVENTIL) nebulizer solution 2.5 mg     Order Specific Question:   Initiate RT Bronchodilator Protocol     Answer:   Yes - Inpatient Protocol    tuberculin injection 5 Units    cefepime (MAXIPIME) 2,000 mg in sodium chloride 0.9 % 50 mL IVPB (mini-bag)     Order Specific Question:   Antimicrobial Indications     Answer:   Pneumonia (HAP)     Order Specific Question:   HAP duration of therapy     Answer:   7 days    cefepime (MAXIPIME) 2,000 mg in sodium chloride 0.9 % 50 mL IVPB (mini-bag)     Order Specific Question:   Antimicrobial Indications     Answer:   Pneumonia (HAP)     Order Specific Question:   HAP duration of therapy     Answer:   7 days    vancomycin (VANCOCIN) 2500 mg in sodium chloride 0.9 % 500 mL IVPB     Order Specific Question:   Antimicrobial Indications     Answer:   Pneumonia (HAP)     Order Specific Question:   HAP duration of therapy     Answer:   7 days    DISCONTD: vancomycin (VANCOCIN) 1,000 mg in sodium chloride 0.9 % 250 mL IVPB ([id number])     Order Specific Question:   Antimicrobial Indications     Answer:   Pneumonia (HAP)     Order Specific Question:   HAP duration of therapy     Answer:   7 days    furosemide (LASIX) injection 20 mg    OLANZapine (ZYPREXA) tablet 20 mg    doxepin (SINEQUAN) capsule 100 mg    DISCONTD: vancomycin (VANCOCIN) 1750 mg in sodium chloride 0.9 % 500 mL IVPB     Order Specific Question:   Antimicrobial Indications     Answer:   Pneumonia (HAP)     Order Specific Question:   HAP duration of therapy     Answer:   7 days    furosemide (LASIX) injection 20 mg    ipratropium-albuterol (DUONEB) nebulizer solution 1 ampule     Order Specific Question:   Initiate RT Bronchodilator Protocol     Answer:   No    budesonide (PULMICORT) nebulizer suspension 500 mcg    DISCONTD: vancomycin (VANCOCIN) 1,000 mg in sodium chloride 0.9 % 250 mL IVPB ([id number])     Order Specific Question:   Antimicrobial Indications     Answer:   Pneumonia (HAP)     Order Specific Question:   HAP duration of therapy     Answer:   7 days    vancomycin (VANCOCIN) 1250 mg in sodium chloride 0.9% 250 mL IVPB     Order Specific Question:   Antimicrobial Indications     Answer:   Pneumonia (HAP)     Order Specific Question:   HAP duration of therapy     Answer:   7 days    escitalopram (LEXAPRO) tablet 20 mg    valproic acid (DEPAKENE) capsule 1,000 mg (Patient Supplied)    metoprolol (LOPRESSOR) injection 5 mg    ALPRAZolam (XANAX) tablet 1 mg    DULoxetine (CYMBALTA) extended release capsule 60 mg    metoprolol tartrate (LOPRESSOR) tablet 25 mg    perflutren lipid microspheres syringe         Signed:  Jennifer Argueta MD    Part of this note may have been written by using a voice dictation software. The note has been proof read but may still contain some grammatical/other typographical errors.

## 2023-02-03 NOTE — PROGRESS NOTES
Pt is resting in bed with no complaints of pain or discomfort at time. Pt remains on 7L NC with even and unlabored respirations. Continuous 02 monitor at the bedside with 02 sats at 93%. Call light is in place.

## 2023-02-03 NOTE — PROGRESS NOTES
[facility] CARDIOLOGY PROGRESS NOTE           2/3/2023 11:29 AM    Admit Date: 1/26/2023         Subjective: Echo today was normal.  Heart rate is controlled on low-dose beta-blocker    ROS:  Cardiovascular:  As noted above    Objective:      Vitals:    02/03/23 0407 02/03/23 0726 02/03/23 0809 02/03/23 1120   BP: 139/86 (!) 140/100  (!) 146/89   Pulse: 96 (!) 101 92 90   Resp: 16 20 18 18   Temp: 98.1 °F (36.7 °C) 98.6 °F (37 °C)  98.1 °F (36.7 °C)   TempSrc: Oral Oral  Oral   SpO2: 96% 100% 99% 93%   Weight:       Height:           On telemetry: Normal sinus rhythm      Physical Exam:  General: Well Developed, Well Nourished, No Acute Distress, Alert & Oriented x 3, Appropriate mood  Neck: supple, no JVD  Heart: S1S2 with RRR without murmurs or gallops  Lungs: Clear throughout auscultation bilaterally without adventitious sounds  Abd: soft, nontender, nondistended, with good bowel sounds  Ext: no edema bilaterally  Skin: warm and dry      Data Review:   Recent Labs     02/02/23  1008 02/03/23  0400 02/03/23  0834    142  --    K 4.5 4.4  --    BUN 11 14  --    WBC  --   --  7.2   HGB  --   --  11.6*   HCT  --   --  36.3   PLT  --   --  606*       No results for input(s): TNIPOC in the last 72 hours. Invalid input(s): TROIQ        Assessment/Plan:     Principal Problem:    Severe sepsis ([address]  Active Problems:    Tachycardia    EDU (acute kidney injury) ([address]    Schizophrenia ([address]    Acute metabolic encephalopathy    Acute hypoxemic respiratory failure ([address]    Morbid obesity ([address]  Resolved Problems:    * No resolved hospital problems.  *    A/P  1) echo reviewed and normal  2) sinus tachycardia continue low-dose beta-blocker    Follow-up with primary care can see cardiology as needed as an outpatient cardiology will sign off    Malik aPn MD  2/3/2023 11:29 AM

## 2023-02-03 NOTE — CARE COORDINATION
CM attempted to reach patient's mother to make her aware that patient will likely be medically stable for discharge on Monday. CM will continue to make efforts to reach her.

## 2023-02-04 PROCEDURE — 94640 AIRWAY INHALATION TREATMENT: CPT

## 2023-02-04 PROCEDURE — 2700000000 HC OXYGEN THERAPY PER DAY

## 2023-02-04 PROCEDURE — 6370000000 HC RX 637 (ALT 250 FOR IP): Performed by: INTERNAL MEDICINE

## 2023-02-04 PROCEDURE — 6370000000 HC RX 637 (ALT 250 FOR IP): Performed by: NURSE PRACTITIONER

## 2023-02-04 PROCEDURE — 2580000003 HC RX 258: Performed by: INTERNAL MEDICINE

## 2023-02-04 PROCEDURE — 1100000000 HC RM PRIVATE

## 2023-02-04 PROCEDURE — 6360000002 HC RX W HCPCS: Performed by: INTERNAL MEDICINE

## 2023-02-04 PROCEDURE — 94761 N-INVAS EAR/PLS OXIMETRY MLT: CPT

## 2023-02-04 RX ADMIN — IPRATROPIUM BROMIDE AND ALBUTEROL SULFATE 1 AMPULE: 2.5; .5 SOLUTION RESPIRATORY (INHALATION) at 20:30

## 2023-02-04 RX ADMIN — IPRATROPIUM BROMIDE AND ALBUTEROL SULFATE 1 AMPULE: 2.5; .5 SOLUTION RESPIRATORY (INHALATION) at 11:23

## 2023-02-04 RX ADMIN — DOXEPIN HYDROCHLORIDE 100 MG: 50 CAPSULE ORAL at 21:36

## 2023-02-04 RX ADMIN — ENOXAPARIN SODIUM 30 MG: 100 INJECTION SUBCUTANEOUS at 21:36

## 2023-02-04 RX ADMIN — ESCITALOPRAM OXALATE 20 MG: 10 TABLET, FILM COATED ORAL at 10:11

## 2023-02-04 RX ADMIN — BUDESONIDE 500 MCG: 0.5 INHALANT RESPIRATORY (INHALATION) at 07:55

## 2023-02-04 RX ADMIN — BUDESONIDE 500 MCG: 0.5 INHALANT RESPIRATORY (INHALATION) at 20:30

## 2023-02-04 RX ADMIN — SODIUM CHLORIDE, PRESERVATIVE FREE 10 ML: 5 INJECTION INTRAVENOUS at 21:37

## 2023-02-04 RX ADMIN — DULOXETINE HYDROCHLORIDE 60 MG: 60 CAPSULE, DELAYED RELEASE ORAL at 10:11

## 2023-02-04 RX ADMIN — METOPROLOL TARTRATE 25 MG: 25 TABLET, FILM COATED ORAL at 21:36

## 2023-02-04 RX ADMIN — ENOXAPARIN SODIUM 30 MG: 100 INJECTION SUBCUTANEOUS at 10:11

## 2023-02-04 RX ADMIN — OLANZAPINE 20 MG: 5 TABLET, FILM COATED ORAL at 21:36

## 2023-02-04 RX ADMIN — IPRATROPIUM BROMIDE AND ALBUTEROL SULFATE 1 AMPULE: 2.5; .5 SOLUTION RESPIRATORY (INHALATION) at 07:52

## 2023-02-04 RX ADMIN — METOPROLOL TARTRATE 25 MG: 25 TABLET, FILM COATED ORAL at 10:11

## 2023-02-04 RX ADMIN — SODIUM CHLORIDE, PRESERVATIVE FREE 10 ML: 5 INJECTION INTRAVENOUS at 10:12

## 2023-02-04 RX ADMIN — IPRATROPIUM BROMIDE AND ALBUTEROL SULFATE 1 AMPULE: 2.5; .5 SOLUTION RESPIRATORY (INHALATION) at 15:42

## 2023-02-04 ASSESSMENT — PAIN SCALES - GENERAL: PAINLEVEL_OUTOF10: 0

## 2023-02-04 NOTE — PROGRESS NOTES
Pt resting in bed. Alert and oriented with intermittent confusion. Respirations even and unlabored, on 5 L NC. Pure wick in place and connected to suction. No signs of distress, safety measures in place and call light in reach.

## 2023-02-04 NOTE — PROGRESS NOTES
Hospitalist Progress Note   Admit Date:  2023 12:56 PM   Name:  Melva Lewis   Age:  45 y.o. Sex:  female  :  1970   MRN:  [id number]   Room:  [facility]/    Presenting Complaint: Fatigue     Reason(s) for Admission: Dehydration [E86.0]  Acute respiratory failure with hypoxia ([address] [J96.01]  Sepsis ([address] [A41.9]  Acute renal failure, unspecified acute renal failure type ([address] [N17.9]  Other fatigue [R53.83]  Pneumonia of both lungs due to infectious organism, unspecified part of lung [J18.9]     History of Present Illness:   Melva Lewis is a 45 y.o. female with a h/o schizophrenia and morbid obesity who presents today with complaints of weakness. Her mom is at bedside who provides the history. Patient developed a cough about 3 days ago. She had a fall yesterday and today seemed weaker and more confused. Oral intake has been reduced. Subjective fever noted today. Otherwise no chest pain, headaches, syncope, palpitations, N/V/D, abdo pain. Patient's mom, Sabine Grace, had a cough and pneumonia recently and was treated with antibiotics, though never told she had COVID or influenza. VS noted tachycardia low 100s, BP improved after IVFs. Labs with mild leukopenia, sCr 2.4 (normal baseline), procal 0.13, normal LA. ABG showed pH 7.42, CO2 34. CXR with bilateral consolidations (L>R). She was given ceftriaxone via EMS and azithromycin was added in the ER. She has been given IVFs and BP improved. Rapid COVID is negative. We are consulted for admission and further management. 2023  Patient seen at bedside, resting in bed comfortably  She is alert and awake, AO x3, on 3 L NC. Patient denies any chest pain, headache, nausea vomiting, abdominal pain, diarrhea. ROS:  10 point review of system is negative except for what mentioned above.     Assessment & Plan:   # Severe sepsis and acute hypoxemic respiratory failure 2/2 CAP vs viral pneumonia  Currently on NC oxygen  2023  Patient has completed IV cefepime for 7 days. Patient currently on 3 L nasal cannula. Continue to wean off further. Titrate for SPO2 of greater than 92%. Continue Pulmicort and DuoNebs. Chest x-ray from 2/3 showing persistent but improving bilateral infiltrates. 2D echo showed EF 65% with normal LV size and normal wall thickness and normal diastolic function. # Acute septic/metabolic encephalopathy  Patient mentation is improving  2/4/2023  Resolved   continue Zyprexa, Cymbalta and doxepin      #Tachycardia  Resolved, Echo wnl  Continue Metoprolol  Cardiology s/o    # EDU    2/4/2023  Remains resolved      # Schizophrenia   - Managed by Dr. Leona Rivas at [facility] (dx at age 13)  2/4/2023  Continue Zyprexa 20 mg p.o. daily nightly,Lexapro 20 mg daily, Depakene and prn Xanax      # Morbid obesity   - Complicates care. Anticipated discharge needs: Anticipate home in the next 48 to 72 hours. She lives at home with family. PT/OT recommends Home Health    diet: Continue easy to chew diet   VTE ppx: Lovenox BID  Code status: full code    I spent 40 minutes of time today caring for this patient on the unit nearby or at bedside. Time may include history, exam, counseling/communication, documentation, ordering and reviewing tests, and conferring with other members of the care team.     Hospital Problems:  Principal Problem:    Severe sepsis ([address]  Active Problems:    Tachycardia    EDU (acute kidney injury) [address]    Schizophrenia ([facility]    Acute metabolic encephalopathy    Acute hypoxemic respiratory failure ([facility]    Morbid obesity ([facility]  Resolved Problems:    * No resolved hospital problems.  *     Objective:   Patient Vitals for the past 24 hrs:   Temp Pulse Resp BP SpO2   02/04/23 0804 98.4 °F (36.9 °C) 89 18 112/62 100 %   02/04/23 0752 -- 86 18 -- 100 %   02/04/23 0414 98.2 °F (36.8 °C) 95 17 (!) 165/91 --   02/04/23 0406 -- 71 -- -- --   02/03/23 2343 97.9 °F (36.6 °C) 86 19 (!) 154/90 100 %   02/03/23 2027 -- 96 17 -- 97 % 02/03/23 2017 97.7 °F (36.5 °C) 91 18 (!) 157/96 98 %   02/03/23 2011 -- 92 -- -- --   02/03/23 1823 98.6 °F (37 °C) -- -- -- --   02/03/23 1620 97.5 °F (36.4 °C) (!) 104 20 116/76 96 %   02/03/23 1606 -- 95 18 -- 98 %   02/03/23 1147 -- 92 18 -- 94 %   02/03/23 1120 98.1 °F (36.7 °C) 90 18 (!) 146/89 93 %         Oxygen Therapy  SpO2: 100 %  Pulse Oximeter Device Mode: Continuous  Pulse Oximeter Device Location: Finger  O2 Device: Nasal cannula  Skin Assessment: Clean, dry, & intact  FiO2 : 40 %  O2 Flow Rate (L/min): 4 L/min    Estimated body mass index is 47.5 kg/m² as calculated from the following:    Height as of this encounter: 5' 6\" (1.676 m). Weight as of this encounter: 294 lb 5 oz (133.5 kg). Intake/Output Summary (Last 24 hours) at 2/4/2023 0841  Last data filed at 2/3/2023 1818  Gross per 24 hour   Intake 240 ml   Output 350 ml   Net -110 ml           Physical Exam:  Blood pressure 112/62, pulse 89, temperature 98.4 °F (36.9 °C), temperature source Axillary, resp. rate 18, height 5' 6\" (1.676 m), weight 294 lb 5 oz (133.5 kg), SpO2 100 %. General:    Morbidly obese, sick appearing, on 3 L NC, NAD morbidly obese.    head:  Normocephalic, atraumatic. Eyes:  Sclerae appear normal.  Pupils equally round. ENT:  Nares appear normal.  Dry oral mucosa. Neck:  No restricted ROM. Trachea midline   CV:   Normal S1-S2, regular rhythm, normal rate, no murmur gallops or rubs  Lungs:   Distant breath sounds, clear mostly, no wheezing or rhonchi  Abdomen:   Soft, nontender, nondistended. Extremities: No cyanosis or clubbing. Chronic bilateral lower extremity edema  Skin:     No rashes and normal coloration. Warm and dry. Neuro:  GCS 15, cranial nerves intact, following commands and answering questions appropriately  Psych:  AOx3, flat affect.     I have personally reviewed labs and tests:  Recent Labs:  Recent Results (from the past 24 hour(s))   Transthoracic echocardiogram (TTE) complete with contrast, bubble, strain, and 3D PRN    Collection Time: 02/03/23  9:19 AM   Result Value Ref Range    LV EDV A2C 98 mL    LV EDV A4C 116 mL    LV ESV A2C 35 mL    LV ESV A4C 41 mL    IVSd 1.0 (A) 0.6 - 0.9 cm    LVIDd 4.1 3.9 - 5.3 cm    LVIDs 2.8 cm    LVOT Diameter 1.8 cm    LVOT Mean Gradient 4 mmHg    LVOT VTI 27.4 cm    LVOT Peak Velocity 1.5 m/s    LVOT Peak Gradient 9 mmHg    LVPWd 0.8 0.6 - 0.9 cm    LV E' Lateral Velocity 10 cm/s    LV E' Septal Velocity 11 cm/s    LV Ejection Fraction A2C 65 %    LV Ejection Fraction A4C 65 %    EF BP 65 55 - 100 %    LVOT Area 2.5 cm2    LVOT SV 69.7 ml    LA Minor Axis 5.1 cm    LA Major [place] 5.2 cm    LA Area 2C 14.4 cm2    LA Area 4C 14.2 cm2    LA Volume 2C 33 22 - 52 mL    LA Volume 4C 31 22 - 52 mL    LA Volume BP 32 22 - 52 mL    LA Diameter 4.4 cm    AV Mean Velocity 1.4 m/s    AV Mean Gradient 9 mmHg    AV VTI 38.3 cm    AV Peak Velocity 2.1 m/s    AV Peak Gradient 18 mmHg    AV Area by VTI 1.8 cm2    AV Area by Peak Velocity 1.8 cm2    Aortic Root 2.7 cm    Ascending Aorta 3.2 cm    MV E Wave Deceleration Time 213.0 ms    MV A Velocity 0.94 m/s    MV E Velocity 0.77 m/s    MV Mean Gradient 5 mmHg    MV VTI 26.3 cm    MV Mean Velocity 1.0 m/s    MV Max Velocity 1.4 m/s    MV Peak Gradient 8 mmHg    MV Area by VTI 2.6 cm2    PV .0 ms    PV Max Velocity 1.3 m/s    PV Peak Gradient 7 mmHg    RVIDd 3.0 cm    RV Basal Dimension 3.6 cm    RV Free Wall Peak S' 14 cm/s    TAPSE 2.4 1.7 cm    TR Max Velocity 2.50 m/s    TR Peak Gradient 25 mmHg    Body Surface Area 2.56 m2    Fractional Shortening 2D 32 28 - 44 %    LV ESV Index A4C 17 mL/m2    LV EDV Index A4C 49 mL/m2    LV ESV Index A2C 15 mL/m2    LV EDV Index A2C 42 mL/m2    LVIDd Index 1.74 cm/m2    LVIDs Index 1.19 cm/m2    LV RWT Ratio 0.39     LV Mass 2D 114.1 67 - 162 g    LV Mass 2D Index 48.4 43 - 95 g/m2    MV E/A 0.82     E/E' Ratio (Averaged) 7.35     E/E' Lateral 7.70     E/E' Septal 7.00     LA Volume Index BP 14 (A) 16 - 34 ml/m2    LVOT Stroke Volume Index 29.5 mL/m2    LA Volume Index 2C 14 (A) 16 - 34 mL/m2    LA Volume Index 4C 13 (A) 16 - 34 mL/m2    LA Size Index 1.86 cm/m2    LA/AO Root Ratio 1.63     Ao Root Index 1.14 cm/m2    Ascending Aorta Index 1.36 cm/m2    AV Velocity Ratio 0.71     LVOT:AV VTI Index 0.72     CHARMAINE/BSA VTI 0.8 cm2/m2    CHARMAINE/BSA Peak Velocity 0.8 cm2/m2    MV:LVOT VTI Index 0.96    Brain Natriuretic Peptide    Collection Time: 02/03/23  8:27 PM   Result Value Ref Range    NT Pro- (H) 5 - 125 PG/ML       I have personally reviewed imaging studies:  XR CHEST PORTABLE    Result Date: 1/26/2023  PORTABLE CHEST 1 VIEW HISTORY: Shortness of breath COMPARISON: 4/17/2021 FINDINGS: Consolidation is present throughout the left lung and in the right upper lobe. EKG leads are present. Lung volumes are diminished. Bilateral consolidation. Echocardiogram:  No results found for this or any previous visit. Orders Placed This Encounter   Medications    azithromycin (ZITHROMAX) 500 mg in sodium chloride 0.9 % 250 mL IVPB ([id number])     Order Specific Question:   Antimicrobial Indications     Answer:   Pneumonia (CAP)    0.9 % sodium chloride bolus    ipratropium-albuterol (DUONEB) nebulizer solution 1 ampule     Order Specific Question:   Initiate RT Bronchodilator Protocol     Answer:   Yes - ED Protocol    sodium chloride flush 0.9 % injection 5-40 mL    sodium chloride flush 0.9 % injection 5-40 mL    0.9 % sodium chloride infusion    OR Linked Order Group     acetaminophen (TYLENOL) tablet 650 mg     acetaminophen (TYLENOL) suppository 650 mg    DISCONTD: cefTRIAXone (ROCEPHIN) 1,000 mg in sodium chloride 0.9 % 50 mL IVPB (mini-bag)     Order Specific Question:   Antimicrobial Indications     Answer:   Pneumonia (CAP)     Order Specific Question:   CAP duration of therapy     Answer:    Other     Order Specific Question:   Other CAP Duration     Answer:   6 more days DISCONTD: azithromycin (ZITHROMAX) 500 mg in sodium chloride 0.9 % 250 mL IVPB ([id number])     Order Specific Question:   Antimicrobial Indications     Answer:   Pneumonia (CAP)     Order Specific Question:   CAP duration of therapy     Answer:    Other     Order Specific Question:   Other CAP Duration     Answer:   6 more days    enoxaparin Sodium (LOVENOX) injection 30 mg     Order Specific Question:   Indication of Use     Answer:   Prophylaxis-DVT/PE    0.9 % sodium chloride bolus    DISCONTD: 0.9 % sodium chloride infusion    albuterol (PROVENTIL) nebulizer solution 2.5 mg     Order Specific Question:   Initiate RT Bronchodilator Protocol     Answer:   Yes - Inpatient Protocol    tuberculin injection 5 Units    cefepime (MAXIPIME) 2,000 mg in sodium chloride 0.9 % 50 mL IVPB (mini-bag)     Order Specific Question:   Antimicrobial Indications     Answer:   Pneumonia (HAP)     Order Specific Question:   HAP duration of therapy     Answer:   7 days    cefepime (MAXIPIME) 2,000 mg in sodium chloride 0.9 % 50 mL IVPB (mini-bag)     Order Specific Question:   Antimicrobial Indications     Answer:   Pneumonia (HAP)     Order Specific Question:   HAP duration of therapy     Answer:   7 days    vancomycin (VANCOCIN) 2500 mg in sodium chloride 0.9 % 500 mL IVPB     Order Specific Question:   Antimicrobial Indications     Answer:   Pneumonia (HAP)     Order Specific Question:   HAP duration of therapy     Answer:   7 days    DISCONTD: vancomycin (VANCOCIN) 1,000 mg in sodium chloride 0.9 % 250 mL IVPB ([id number])     Order Specific Question:   Antimicrobial Indications     Answer:   Pneumonia (HAP)     Order Specific Question:   HAP duration of therapy     Answer:   7 days    furosemide (LASIX) injection 20 mg    OLANZapine (ZYPREXA) tablet 20 mg    doxepin (SINEQUAN) capsule 100 mg    DISCONTD: vancomycin (VANCOCIN) 1750 mg in sodium chloride 0.9 % 500 mL IVPB     Order Specific Question:   Antimicrobial Indications Answer:   Pneumonia (HAP)     Order Specific Question:   HAP duration of therapy     Answer:   7 days    furosemide (LASIX) injection 20 mg    ipratropium-albuterol (DUONEB) nebulizer solution 1 ampule     Order Specific Question:   Initiate RT Bronchodilator Protocol     Answer:   No    budesonide (PULMICORT) nebulizer suspension 500 mcg    DISCONTD: vancomycin (VANCOCIN) 1,000 mg in sodium chloride 0.9 % 250 mL IVPB ([id number])     Order Specific Question:   Antimicrobial Indications     Answer:   Pneumonia (HAP)     Order Specific Question:   HAP duration of therapy     Answer:   7 days    vancomycin (VANCOCIN) 1250 mg in sodium chloride 0.9% 250 mL IVPB     Order Specific Question:   Antimicrobial Indications     Answer:   Pneumonia (HAP)     Order Specific Question:   HAP duration of therapy     Answer:   7 days    escitalopram (LEXAPRO) tablet 20 mg    valproic acid (DEPAKENE) capsule 1,000 mg (Patient Supplied)    metoprolol (LOPRESSOR) injection 5 mg    ALPRAZolam (XANAX) tablet 1 mg    DULoxetine (CYMBALTA) extended release capsule 60 mg    metoprolol tartrate (LOPRESSOR) tablet 25 mg    perflutren lipid microspheres syringe         Signed:  Liza Lacey MD    Part of this note may have been written by using a voice dictation software. The note has been proof read but may still contain some grammatical/other typographical errors.

## 2023-02-04 NOTE — PLAN OF CARE
Problem: Respiratory - Adult  Goal: Achieves optimal ventilation and oxygenation  Outcome: Progressing  Flowsheets (Taken 2/4/2023 0922)  Achieves optimal ventilation and oxygenation:   Assess for changes in respiratory status   Position to facilitate oxygenation and minimize respiratory effort   Respiratory therapy support as indicated   Assess for changes in mentation and behavior   Oxygen supplementation based on oxygen saturation or arterial blood gases   Assess and instruct to report shortness of breath or any respiratory difficulty

## 2023-02-04 NOTE — PROGRESS NOTES
Pt was awake    Calm    Receptive to     Engaging in conversation    Talked about her two Elsie dolls - names    Assurance of prayer

## 2023-02-05 PROCEDURE — 1100000000 HC RM PRIVATE

## 2023-02-05 PROCEDURE — 2580000003 HC RX 258: Performed by: INTERNAL MEDICINE

## 2023-02-05 PROCEDURE — 6370000000 HC RX 637 (ALT 250 FOR IP): Performed by: INTERNAL MEDICINE

## 2023-02-05 PROCEDURE — 94640 AIRWAY INHALATION TREATMENT: CPT

## 2023-02-05 PROCEDURE — 6360000002 HC RX W HCPCS: Performed by: INTERNAL MEDICINE

## 2023-02-05 PROCEDURE — 6370000000 HC RX 637 (ALT 250 FOR IP): Performed by: NURSE PRACTITIONER

## 2023-02-05 PROCEDURE — 2700000000 HC OXYGEN THERAPY PER DAY

## 2023-02-05 PROCEDURE — 94761 N-INVAS EAR/PLS OXIMETRY MLT: CPT

## 2023-02-05 RX ADMIN — IPRATROPIUM BROMIDE AND ALBUTEROL SULFATE 1 AMPULE: 2.5; .5 SOLUTION RESPIRATORY (INHALATION) at 15:47

## 2023-02-05 RX ADMIN — METOPROLOL TARTRATE 25 MG: 25 TABLET, FILM COATED ORAL at 09:54

## 2023-02-05 RX ADMIN — IPRATROPIUM BROMIDE AND ALBUTEROL SULFATE 1 AMPULE: 2.5; .5 SOLUTION RESPIRATORY (INHALATION) at 11:14

## 2023-02-05 RX ADMIN — ESCITALOPRAM OXALATE 20 MG: 10 TABLET, FILM COATED ORAL at 09:54

## 2023-02-05 RX ADMIN — IPRATROPIUM BROMIDE AND ALBUTEROL SULFATE 1 AMPULE: 2.5; .5 SOLUTION RESPIRATORY (INHALATION) at 07:18

## 2023-02-05 RX ADMIN — BUDESONIDE 500 MCG: 0.5 INHALANT RESPIRATORY (INHALATION) at 07:18

## 2023-02-05 RX ADMIN — OLANZAPINE 20 MG: 5 TABLET, FILM COATED ORAL at 21:24

## 2023-02-05 RX ADMIN — SODIUM CHLORIDE, PRESERVATIVE FREE 10 ML: 5 INJECTION INTRAVENOUS at 21:24

## 2023-02-05 RX ADMIN — DOXEPIN HYDROCHLORIDE 100 MG: 50 CAPSULE ORAL at 21:24

## 2023-02-05 RX ADMIN — SODIUM CHLORIDE, PRESERVATIVE FREE 10 ML: 5 INJECTION INTRAVENOUS at 09:55

## 2023-02-05 RX ADMIN — ENOXAPARIN SODIUM 30 MG: 100 INJECTION SUBCUTANEOUS at 09:54

## 2023-02-05 RX ADMIN — DULOXETINE HYDROCHLORIDE 60 MG: 60 CAPSULE, DELAYED RELEASE ORAL at 09:54

## 2023-02-05 RX ADMIN — ENOXAPARIN SODIUM 30 MG: 100 INJECTION SUBCUTANEOUS at 21:23

## 2023-02-05 RX ADMIN — METOPROLOL TARTRATE 25 MG: 25 TABLET, FILM COATED ORAL at 21:24

## 2023-02-05 ASSESSMENT — PAIN SCALES - GENERAL: PAINLEVEL_OUTOF10: 0

## 2023-02-05 NOTE — PROGRESS NOTES
Hospitalist Progress Note   Admit Date:  2023 12:56 PM   Name:  Fortino Salazar   Age:  45 y.o. Sex:  female  :  1970   MRN:  [id number]   Room:  [facility]    Presenting Complaint: Fatigue     Reason(s) for Admission: Dehydration [E86.0]  Acute respiratory failure with hypoxia ([address] [J96.01]  Sepsis ([address] [A41.9]  Acute renal failure, unspecified acute renal failure type ([address] [N17.9]  Other fatigue [R53.83]  Pneumonia of both lungs due to infectious organism, unspecified part of lung [J18.9]     History of Present Illness:   Fortino Salazar is a 45 y.o. female with a h/o schizophrenia and morbid obesity who presents today with complaints of weakness. Her mom is at bedside who provides the history. Patient developed a cough about 3 days ago. She had a fall yesterday and today seemed weaker and more confused. Oral intake has been reduced. Subjective fever noted today. Otherwise no chest pain, headaches, syncope, palpitations, N/V/D, abdo pain. Patient's mom, Amanda Flores, had a cough and pneumonia recently and was treated with antibiotics, though never told she had COVID or influenza. VS noted tachycardia low 100s, BP improved after IVFs. Labs with mild leukopenia, sCr 2.4 (normal baseline), procal 0.13, normal LA. ABG showed pH 7.42, CO2 34. CXR with bilateral consolidations (L>R). She was given ceftriaxone via EMS and azithromycin was added in the ER. She has been given IVFs and BP improved. Rapid COVID is negative. We are consulted for admission and further management. 2023  Patient seen at bedside, resting in bed comfortably. She is alert and awake, AOx3, on 2 L nasal cannula. Patient reports feeling scared and wanted to go home. She denies any chest pain, abdominal pain, nausea vomiting, diarrhea, fever or chills. ROS:  10 point review of system is negative except for what mentioned above.     Assessment & Plan:   # Severe sepsis and acute hypoxemic respiratory failure 2/2 CAP vs viral pneumonia  Currently on NC oxygen  2/5/2023  Patient has completed IV cefepime for 7 days. Patient currently on 2 L nasal cannula. Continue to wean off further. Titrate for SPO2 of greater than 92%. Continue Pulmicort and DuoNebs. Chest x-ray from 2/3 showing persistent but improving bilateral infiltrates. 2D echo showed EF 65% with normal LV size and normal wall thickness and normal diastolic function. # Acute septic/metabolic encephalopathy  Patient mentation is improving  2/5/2023  Resolved   continue Zyprexa, Cymbalta and doxepin      #Tachycardia  Resolved, Echo wnl  Continue Metoprolol  Cardiology s/o    # EDU    2/5/2023  Remains resolved      # Schizophrenia   - Managed by Dr. Paula Nobles at [facility] (dx at age 13)  2/5/2023  Continue Zyprexa 20 mg p.o. daily nightly,Lexapro 20 mg daily, Depakene and prn Xanax      # Morbid obesity   - Complicates care. Anticipated discharge needs: Discussed with patient's mother about discharge planning, patient has expressed wishes to go home with home health aide/PT and OT. As per the mother, patient is usually able to walk with a walker at baseline. We will follow-up with PT OT and  on 2/6 and finalize the discharge plan. diet: Continue easy to chew diet   VTE ppx: Lovenox BID  Code status: full code    I spent 38 minutes of time today caring for this patient on the unit nearby or at bedside. Time may include history, exam, counseling/communication, documentation, ordering and reviewing tests, and conferring with other members of the care team.     Hospital Problems:  Principal Problem:    Severe sepsis ([address]  Active Problems:    Tachycardia    EDU (acute kidney injury) ([address]    Schizophrenia ([address]    Acute metabolic encephalopathy    Acute hypoxemic respiratory failure ([address]    Morbid obesity ([address]  Resolved Problems:    * No resolved hospital problems.  *     Objective:   Patient Vitals for the past 24 hrs:   Temp Pulse Resp BP SpO2 02/05/23 0742 98.1 °F (36.7 °C) 92 18 (!) 147/95 99 %   02/05/23 0718 -- 95 16 -- 96 %   02/05/23 0351 97.7 °F (36.5 °C) 88 -- (!) 147/85 --   02/04/23 2336 97.3 °F (36.3 °C) 82 -- (!) 144/90 --   02/04/23 2030 -- 92 18 -- 97 %   02/04/23 2020 98.2 °F (36.8 °C) 86 18 (!) 177/97 --   02/04/23 1542 -- 90 18 -- 98 %   02/04/23 1534 98.1 °F (36.7 °C) 92 18 130/76 96 %   02/04/23 1140 97.5 °F (36.4 °C) 87 18 122/70 94 %   02/04/23 1123 -- 85 18 -- 100 %   02/04/23 0804 98.4 °F (36.9 °C) 89 18 112/62 100 %         Oxygen Therapy  SpO2: 99 %  Pulse Oximeter Device Mode: Continuous  Pulse Oximeter Device Location: Left, Finger  O2 Device: Nasal cannula  Skin Assessment: Clean, dry, & intact  FiO2 : 40 %  O2 Flow Rate (L/min): 2 L/min    Estimated body mass index is 47.5 kg/m² as calculated from the following:    Height as of this encounter: 5' 6\" (1.676 m). Weight as of this encounter: 294 lb 5 oz (133.5 kg). Intake/Output Summary (Last 24 hours) at 2/5/2023 0800  Last data filed at 2/5/2023 6887  Gross per 24 hour   Intake --   Output 700 ml   Net -700 ml           Physical Exam:  Blood pressure (!) 147/95, pulse 92, temperature 98.1 °F (36.7 °C), temperature source Axillary, resp. rate 18, height 5' 6\" (1.676 m), weight 294 lb 5 oz (133.5 kg), SpO2 99 %. General:    Morbidly obese, sick appearing, on 2L NC, NAD morbidly obese.    head:  Normocephalic, atraumatic. Eyes:  Sclerae appear normal.  Pupils equally round. ENT:  Nares appear normal.  Dry oral mucosa. Neck:  No restricted ROM. Trachea midline   CV:   Normal S1-S2, regular rhythm, normal rate, no murmur gallops or rubs  Lungs:   Distant breath sounds, clear mostly, no wheezing or rhonchi  Abdomen:   Soft, nontender, nondistended. Extremities: No cyanosis or clubbing. Chronic bilateral lower extremity edema  Skin:     No rashes and normal coloration. Warm and dry.     Neuro:  GCS 15, cranial nerves intact, following commands and answering questions appropriately  Psych:  AOx3, flat affect. I have personally reviewed labs and tests:  Recent Labs:  No results found for this or any previous visit (from the past 24 hour(s)). I have personally reviewed imaging studies:  XR CHEST PORTABLE    Result Date: 1/26/2023  PORTABLE CHEST 1 VIEW HISTORY: Shortness of breath COMPARISON: 4/17/2021 FINDINGS: Consolidation is present throughout the left lung and in the right upper lobe. EKG leads are present. Lung volumes are diminished. Bilateral consolidation. Echocardiogram:  No results found for this or any previous visit. Orders Placed This Encounter   Medications    azithromycin (ZITHROMAX) 500 mg in sodium chloride 0.9 % 250 mL IVPB ([id number])     Order Specific Question:   Antimicrobial Indications     Answer:   Pneumonia (CAP)    0.9 % sodium chloride bolus    ipratropium-albuterol (DUONEB) nebulizer solution 1 ampule     Order Specific Question:   Initiate RT Bronchodilator Protocol     Answer:   Yes - ED Protocol    sodium chloride flush 0.9 % injection 5-40 mL    sodium chloride flush 0.9 % injection 5-40 mL    0.9 % sodium chloride infusion    OR Linked Order Group     acetaminophen (TYLENOL) tablet 650 mg     acetaminophen (TYLENOL) suppository 650 mg    DISCONTD: cefTRIAXone (ROCEPHIN) 1,000 mg in sodium chloride 0.9 % 50 mL IVPB (mini-bag)     Order Specific Question:   Antimicrobial Indications     Answer:   Pneumonia (CAP)     Order Specific Question:   CAP duration of therapy     Answer: Other     Order Specific Question:   Other CAP Duration     Answer:   6 more days    DISCONTD: azithromycin (ZITHROMAX) 500 mg in sodium chloride 0.9 % 250 mL IVPB ([id number])     Order Specific Question:   Antimicrobial Indications     Answer:   Pneumonia (CAP)     Order Specific Question:   CAP duration of therapy     Answer:    Other     Order Specific Question:   Other CAP Duration     Answer:   6 more days    enoxaparin Sodium (LOVENOX) injection 30 mg     Order Specific Question:   Indication of Use     Answer:   Prophylaxis-DVT/PE    0.9 % sodium chloride bolus    DISCONTD: 0.9 % sodium chloride infusion    albuterol (PROVENTIL) nebulizer solution 2.5 mg     Order Specific Question:   Initiate RT Bronchodilator Protocol     Answer:   Yes - Inpatient Protocol    tuberculin injection 5 Units    cefepime (MAXIPIME) 2,000 mg in sodium chloride 0.9 % 50 mL IVPB (mini-bag)     Order Specific Question:   Antimicrobial Indications     Answer:   Pneumonia (HAP)     Order Specific Question:   HAP duration of therapy     Answer:   7 days    cefepime (MAXIPIME) 2,000 mg in sodium chloride 0.9 % 50 mL IVPB (mini-bag)     Order Specific Question:   Antimicrobial Indications     Answer:   Pneumonia (HAP)     Order Specific Question:   HAP duration of therapy     Answer:   7 days    vancomycin (VANCOCIN) 2500 mg in sodium chloride 0.9 % 500 mL IVPB     Order Specific Question:   Antimicrobial Indications     Answer:   Pneumonia (HAP)     Order Specific Question:   HAP duration of therapy     Answer:   7 days    DISCONTD: vancomycin (VANCOCIN) 1,000 mg in sodium chloride 0.9 % 250 mL IVPB ([id number])     Order Specific Question:   Antimicrobial Indications     Answer:   Pneumonia (HAP)     Order Specific Question:   HAP duration of therapy     Answer:   7 days    furosemide (LASIX) injection 20 mg    OLANZapine (ZYPREXA) tablet 20 mg    doxepin (SINEQUAN) capsule 100 mg    DISCONTD: vancomycin (VANCOCIN) 1750 mg in sodium chloride 0.9 % 500 mL IVPB     Order Specific Question:   Antimicrobial Indications     Answer:   Pneumonia (HAP)     Order Specific Question:   HAP duration of therapy     Answer:   7 days    furosemide (LASIX) injection 20 mg    ipratropium-albuterol (DUONEB) nebulizer solution 1 ampule     Order Specific Question:   Initiate RT Bronchodilator Protocol     Answer:   No    budesonide (PULMICORT) nebulizer suspension 500 mcg    DISCONTD: vancomycin (VANCOCIN) 1,000 mg in sodium chloride 0.9 % 250 mL IVPB ([id number])     Order Specific Question:   Antimicrobial Indications     Answer:   Pneumonia (HAP)     Order Specific Question:   HAP duration of therapy     Answer:   7 days    vancomycin (VANCOCIN) 1250 mg in sodium chloride 0.9% 250 mL IVPB     Order Specific Question:   Antimicrobial Indications     Answer:   Pneumonia (HAP)     Order Specific Question:   HAP duration of therapy     Answer:   7 days    escitalopram (LEXAPRO) tablet 20 mg    valproic acid (DEPAKENE) capsule 1,000 mg (Patient Supplied)    metoprolol (LOPRESSOR) injection 5 mg    ALPRAZolam (XANAX) tablet 1 mg    DULoxetine (CYMBALTA) extended release capsule 60 mg    metoprolol tartrate (LOPRESSOR) tablet 25 mg    perflutren lipid microspheres syringe         Signed:  Mary Bustillos MD    Part of this note may have been written by using a voice dictation software. The note has been proof read but may still contain some grammatical/other typographical errors.

## 2023-02-06 VITALS
BODY MASS INDEX: 47.09 KG/M2 | DIASTOLIC BLOOD PRESSURE: 91 MMHG | WEIGHT: 293 LBS | OXYGEN SATURATION: 90 % | HEIGHT: 66 IN | TEMPERATURE: 97.7 F | SYSTOLIC BLOOD PRESSURE: 134 MMHG | RESPIRATION RATE: 18 BRPM | HEART RATE: 117 BPM

## 2023-02-06 LAB
GLUCOSE BLD STRIP.AUTO-MCNC: 112 MG/DL (ref 65–100)
SERVICE CMNT-IMP: ABNORMAL

## 2023-02-06 PROCEDURE — 6360000002 HC RX W HCPCS: Performed by: INTERNAL MEDICINE

## 2023-02-06 PROCEDURE — 6370000000 HC RX 637 (ALT 250 FOR IP): Performed by: INTERNAL MEDICINE

## 2023-02-06 PROCEDURE — 2580000003 HC RX 258: Performed by: INTERNAL MEDICINE

## 2023-02-06 PROCEDURE — 2700000000 HC OXYGEN THERAPY PER DAY

## 2023-02-06 PROCEDURE — 82962 GLUCOSE BLOOD TEST: CPT

## 2023-02-06 PROCEDURE — 94761 N-INVAS EAR/PLS OXIMETRY MLT: CPT

## 2023-02-06 PROCEDURE — 94760 N-INVAS EAR/PLS OXIMETRY 1: CPT

## 2023-02-06 PROCEDURE — 6370000000 HC RX 637 (ALT 250 FOR IP): Performed by: NURSE PRACTITIONER

## 2023-02-06 PROCEDURE — 94640 AIRWAY INHALATION TREATMENT: CPT

## 2023-02-06 RX ORDER — BUDESONIDE 0.5 MG/2ML
0.5 INHALANT ORAL 2 TIMES DAILY
Qty: 60 EACH | Refills: 3 | Status: SHIPPED | OUTPATIENT
Start: 2023-02-06

## 2023-02-06 RX ORDER — ALBUTEROL SULFATE 2.5 MG/3ML
2.5 SOLUTION RESPIRATORY (INHALATION) EVERY 6 HOURS PRN
Qty: 120 EACH | Refills: 3 | Status: SHIPPED | OUTPATIENT
Start: 2023-02-06

## 2023-02-06 RX ORDER — LISINOPRIL 20 MG/1
20 TABLET ORAL DAILY
Qty: 30 TABLET | Refills: 3 | Status: SHIPPED | OUTPATIENT
Start: 2023-02-06 | End: 2023-03-08

## 2023-02-06 RX ORDER — CHLORPROMAZINE HYDROCHLORIDE 50 MG/1
TABLET, FILM COATED ORAL
Qty: 60 TABLET | Refills: 2 | Status: SHIPPED | OUTPATIENT
Start: 2023-02-06 | End: 2023-03-06

## 2023-02-06 RX ADMIN — BUDESONIDE 500 MCG: 0.5 INHALANT RESPIRATORY (INHALATION) at 07:38

## 2023-02-06 RX ADMIN — IPRATROPIUM BROMIDE AND ALBUTEROL SULFATE 1 AMPULE: 2.5; .5 SOLUTION RESPIRATORY (INHALATION) at 11:18

## 2023-02-06 RX ADMIN — SODIUM CHLORIDE, PRESERVATIVE FREE 10 ML: 5 INJECTION INTRAVENOUS at 10:00

## 2023-02-06 RX ADMIN — IPRATROPIUM BROMIDE AND ALBUTEROL SULFATE 1 AMPULE: 2.5; .5 SOLUTION RESPIRATORY (INHALATION) at 07:38

## 2023-02-06 RX ADMIN — ESCITALOPRAM OXALATE 20 MG: 10 TABLET, FILM COATED ORAL at 09:59

## 2023-02-06 RX ADMIN — METOPROLOL TARTRATE 25 MG: 25 TABLET, FILM COATED ORAL at 09:59

## 2023-02-06 RX ADMIN — IPRATROPIUM BROMIDE AND ALBUTEROL SULFATE 1 AMPULE: 2.5; .5 SOLUTION RESPIRATORY (INHALATION) at 15:50

## 2023-02-06 RX ADMIN — DULOXETINE HYDROCHLORIDE 60 MG: 60 CAPSULE, DELAYED RELEASE ORAL at 09:59

## 2023-02-06 RX ADMIN — ENOXAPARIN SODIUM 30 MG: 100 INJECTION SUBCUTANEOUS at 10:00

## 2023-02-06 ASSESSMENT — PAIN SCALES - GENERAL: PAINLEVEL_OUTOF10: 0

## 2023-02-06 NOTE — PROGRESS NOTES
Pt resting in bed. Alert and oriented x 2. Respirations even and unlabored. On 2 L NC. Continuous pulse ox connected, sat 97%. Pure wick replaced and connected to suction. No signs of distress. Safety measures in place and call light within reach. Preparing to give report to oncoming nurse.

## 2023-02-06 NOTE — DISCHARGE SUMMARY
Hospitalist Discharge Summary   Admit Date:  2023 12:56 PM   DC Note date: 2023  Name:  Lidya Shannon   Age:  45 y.o. Sex:  female  :  1970   MRN:  [id number]   Room:  [facility]  PCP:  None None    Presenting Complaint: Fatigue     Initial Admission Diagnosis: Dehydration [E86.0]  Acute respiratory failure with hypoxia (HCC) [J96.01]  Sepsis ([address] [A41.9]  Acute renal failure, unspecified acute renal failure type ([address] [N17.9]  Other fatigue [R53.83]  Pneumonia of both lungs due to infectious organism, unspecified part of lung [J18.9]     Problem List for this Hospitalization (present on admission):    Principal Problem:    Severe sepsis ([address]  Active Problems:    Tachycardia    EDU (acute kidney injury) ([address]    Schizophrenia ([address]    Acute metabolic encephalopathy    Acute hypoxemic respiratory failure ([address]    Morbid obesity ([address]  Resolved Problems:    * No resolved hospital problems. *      Hospital Course:  Lidya Shannon is a 45 y.o. female with a h/o schizophrenia and morbid obesity who presents today with complaints of weakness. Her mom is at bedside who provides the history. Patient developed a cough about 3 days ago. She had a fall yesterday and today seemed weaker and more confused. Oral intake has been reduced. Subjective fever noted today. Otherwise no chest pain, headaches, syncope, palpitations, N/V/D, abdo pain. Patient's mom, Akiko Whitmore, had a cough and pneumonia recently and was treated with antibiotics, though never told she had COVID or influenza. VS noted tachycardia low 100s, BP improved after IVFs. Labs with mild leukopenia, sCr 2.4 (normal baseline), procal 0.13, normal LA. ABG showed pH 7.42, CO2 34. CXR with bilateral consolidations (L>R). She was given ceftriaxone via EMS and azithromycin was added in the ER. She has been given IVFs and BP improved. Rapid COVID is negative. We are consulted for admission and further management.      # Severe sepsis and acute hypoxemic respiratory failure 2/2 CAP vs viral pneumonia  Currently on NC oxygen  2/6/2023  Patient has completed IV cefepime for 7 days.    Pt on Room Air at rest SAT-94%.  Pt up and ambulating on room air SAT-97%.  Pt seated and recovered on Room Air SAT-94%  Continue Pulmicort and DuoNebs.  Chest x-ray from 2/3 showing persistent but improving bilateral infiltrates.  2D echo showed EF 65% with normal LV size and normal wall thickness and normal diastolic function.     # Acute septic/metabolic encephalopathy  Patient mentation is improving  2/6/2023  Resolved   continue Zyprexa, Cymbalta and doxepin        #Tachycardia  Resolved, Echo wnl  Continue Metoprolol  Cardiology s/o     # EDU     2/6/2023  Remains resolved        # Schizophrenia              - Managed by Dr. Donaldson at [facility] (dx at age 12)  2/6/2023  Continue Zyprexa 20 mg p.o. daily nightly,Lexapro 20 mg daily, Depakene and prn Xanax        # Morbid obesity              - Complicates care.     Disposition: Patient to be discharged to home with home health aide PT OT.  Diet: ADULT DIET; Easy to Chew  Code Status: Full Code    Follow Ups:   Contact information for follow-up providers     [facility].    Specialties: General Practice, In-Home Clinical Assessments  Why: Call and set up a one week hospital follow  Contact information:  [address] [place] [address] [phone]                 Contact information for after-discharge care     Discharge Destination     [facility] .    Service: Home Health Services  Contact information:  [address] [place] [id number] [phone]                           Time spent in patient discharge and coordination 36 minutes.    Plan was discussed with patient.  All questions answered.  Patient was stable at time of discharge.  Instructions given to call a physician or return if any concerns.    Current Discharge Medication List        START  taking these medications    Details   budesonide (PULMICORT) 0.5 MG/2ML nebulizer suspension Take 2 mLs by nebulization in the morning and 2 mLs in the evening. Qty: 60 each, Refills: 3      albuterol (PROVENTIL) (2.5 MG/3ML) 0.083% nebulizer solution Take 3 mLs by nebulization every 6 hours as needed for Wheezing  Qty: 120 each, Refills: 3      metoprolol tartrate (LOPRESSOR) 25 MG tablet Take 1 tablet by mouth 2 times daily  Qty: 60 tablet, Refills: 3           CONTINUE these medications which have CHANGED    Details   lisinopril (PRINIVIL;ZESTRIL) 20 MG tablet Take 1 tablet by mouth daily  Qty: 30 tablet, Refills: 3      chlorproMAZINE (THORAZINE) 50 MG tablet 100 mg orally in AM, and 100 mg orally in PM  Qty: 60 tablet, Refills: 2           CONTINUE these medications which have NOT CHANGED    Details   DULoxetine (CYMBALTA) 60 MG extended release capsule Take 60 mg by mouth daily      ALPRAZolam (XANAX) 1 MG tablet Take 1 mg by mouth 4 times daily as needed for Sleep. OLANZapine (ZYPREXA) 20 MG tablet Take 20 mg by mouth nightly      doxepin (SINEQUAN) 100 MG capsule Take 100 mg by mouth nightly      valproic acid (DEPAKENE) 250 MG capsule Take 1,000 mg by mouth nightly      escitalopram (LEXAPRO) 20 MG tablet Take 20 mg by mouth daily           STOP taking these medications       potassium chloride (MICRO-K) 10 MEQ extended release capsule Comments:   Reason for Stopping:               Some medications may have been reported old/obsolete and marked \"stop taking\" by the system; in reality pt was already off these meds; defer to outpatient or prescribing providers.     Procedures done this admission:  * No surgery found *    Consults this admission:  IP WOUND CARE NURSE CONSULT TO EVAL  IP CONSULT TO PHARMACY  IP CONSULT TO PHYSICAL THERAPY  IP CONSULT TO OCCUPATIONAL THERAPY  IP CONSULT TO CARDIOLOGY  IP CONSULT TO CASE MANAGEMENT  [facility] [address] [facility]:  01[facility]26[facility]    TRANSTHORACIC ECHOCARDIOGRAM (TTE) COMPLETE (CONTRAST/BUBBLE/3D PRN) 02/03/2023 10:43 AM, 02/03/2023 12:00 AM (Final)    Interpretation Summary    Left Ventricle: Normal left ventricular systolic function. EF by 2D Simpsons Biplane is 65%. Left ventricle size is normal. Normal wall thickness. Normal wall motion. Normal diastolic function. Aorta: Ao root diameter is 2.7 cm. Ao Root Index is 1.14 cm/m2. Technical qualifiers: Procedure performed with the patient in a supine position, color flow Doppler was performed and pulse wave and/or continuous wave Doppler was performed. Contrast used: Definity. Signed by: Niki Pierre MD on 2/3/2023 10:43 AM, Signed by: Unknown Provider Result on 2/3/2023 12:00 AM      Diagnostic Imaging/Tests:   XR CHEST PORTABLE    Result Date: 1/27/2023  1. Worsening pulmonary infiltrates any questionable need small left lateral loculated effusion. This report was made using voice transcription. Despite my best efforts to avoid any, transcription errors may persist. If there is any question about the accuracy of the report or need for clarification, then please call [phone], or text me through perfectserv for clarification or correction. XR CHEST PORTABLE    Result Date: 1/26/2023  Bilateral consolidation. XR CHEST 1 VIEW    Result Date: 2/3/2023  Persistent but improving bilateral infiltrates        Labs: Results:       BMP, Mg, Phos No results for input(s): NA, K, CL, CO2, ANIONGAP, BUN, CREATININE, LABGLOM, GFRAA, CALCIUM, GLUCOSE, MG, PHOS in the last 72 hours. CBC No results for input(s): WBC, RBC, HGB, HCT, MCV, MCH, MCHC, RDW, PLT, MPV, NRBC, SEGS, LYMPHOPCT, EOSRELPCT, MONOPCT, BASOPCT, IMMGRAN, SEGSABS, LYMPHSABS, EOSABS, MONOSABS, BASOSABS, ABSIMMGRAN in the last 72 hours. LFT No results for input(s): BILITOT, BILIDIR, ALKPHOS, AST, ALT, PROT, LABALBU, GLOB in the last 72 hours.    Cardiac  Lab Results   Component Value Date/Time    NTPROBNP 919 02/03/2023 08:27 PM    NTPROBNP 141 01/28/2023 10:03 AM    NTPROBNP 43 01/26/2023 01:13 PM    TROPHS 14.9 01/26/2023 06:17 PM    TROPHS 16.4 01/26/2023 01:13 PM      Coags Lab Results   Component Value Date/Time    PROTIME 13.3 01/26/2023 01:13 PM    INR 1.0 01/26/2023 01:13 PM      A1c No results found for: LABA1C, EAG   Lipids No results found for: CHOL, LDLCALC, LABVLDL, HDL, CHOLHDLRATIO, TRIG   Thyroid  No results found for: Venkata How     Most Recent UA No results found for: COLORU, APPEARANCE, SPECGRAV, LABPH, PROTEINU, GLUCOSEU, KETUA, BILIRUBINUR, BLOODU, [address], NITRU, [address], [facility], [address] [place] [address], EPITHUA, BACTERIA, LABCAST, MUCUS     Recent Labs     01/26/23  1316 01/26/23  1300   CULTURE NO GROWTH 5 DAYS NO GROWTH 5 DAYS       All Labs from Last 24 Hrs:  No results found for this or any previous visit (from the past 24 hour(s)). Allergies   Allergen Reactions    Penicillins Hives     Immunization History   Administered Date(s) Administered    PPD Test 01/26/2023       Recent Vital Data:  Patient Vitals for the past 24 hrs:   Temp Pulse Resp BP SpO2   02/06/23 1105 98.1 °F (36.7 °C) 96 18 (!) 153/83 90 %   02/06/23 0809 98.4 °F (36.9 °C) 95 18 (!) 150/90 93 %   02/06/23 0740 -- -- -- -- 97 %   02/06/23 0353 97.5 °F (36.4 °C) 89 19 (!) 161/94 98 %   02/06/23 0032 -- 83 -- -- --   02/05/23 2332 97.3 °F (36.3 °C) 91 19 (!) 165/90 99 %   02/05/23 1957 97.3 °F (36.3 °C) (!) 103 18 (!) 161/122 94 %   02/05/23 1547 -- 96 17 -- 97 %   02/05/23 1443 98.4 °F (36.9 °C) (!) 102 18 (!) 152/98 90 %       Oxygen Therapy  SpO2: 90 %  Pulse Oximeter Device Mode: Continuous  Pulse Oximeter Device Location: Left, Finger  O2 Device: None (Room air)  Skin Assessment: Clean, dry, & intact  FiO2 : 40 %  O2 Flow Rate (L/min): 2 L/min    Estimated body mass index is 47.5 kg/m² as calculated from the following:    Height as of this encounter: 5' 6\" (1.676 m).     Weight as of this encounter: 294 lb 5 oz (133.5 kg). Intake/Output Summary (Last 24 hours) at 2/6/2023 1346  Last data filed at 2/6/2023 1120  Gross per 24 hour   Intake 240 ml   Output 1050 ml   Net -810 ml         Physical Exam:  General:          Morbidly obese, sick appearing, on RA, NAD  head:               Normocephalic, atraumatic. Eyes:               Sclerae appear normal.  Pupils equally round. ENT:                Nares appear normal.  Dry oral mucosa. Neck:               No restricted ROM. Trachea midline   CV:                  Normal S1-S2, regular rhythm, normal rate, no murmur gallops or rubs  Lungs:             Distant breath sounds, clear mostly, no wheezing or rhonchi  Abdomen:        Soft, nontender, nondistended. Extremities:     No cyanosis or clubbing. Chronic bilateral lower extremity edema  Skin:                No rashes and normal coloration. Warm and dry. Neuro:             GCS 15, cranial nerves intact, following commands and answering questions appropriately  Psych:             AOx3, flat affect. Signed:  Shanti Jones MD    Part of this note may have been written by using a voice dictation software. The note has been proof read but may still contain some grammatical/other typographical errors.

## 2023-02-06 NOTE — PROGRESS NOTES
Patient discharged home with home health  all lines removed patient's belongings packed placed at bedside  discharge instructions/medicaitons/upcoming appointments for continuation of care reviewed with Annemarie Person (mother) via phone verbalized understanding   ETA PU 1500

## 2023-02-06 NOTE — PROGRESS NOTES
Pt resting in bed, family at bedside. Respirations even and unlabored, on 2 L NC. Purewick in place and connected to suction. No signs of distress. Safety measures in place, call light within reach.

## 2023-02-06 NOTE — CARE COORDINATION
02/06/23 1401   Services At/After Discharge   Transition of Care Consult (CM Consult) [facility] Resource Information Provided? No   Mode of Transport at Discharge BLS   Confirm Follow Up Transport Other (see comment)   Condition of Participation: Discharge Planning   The Patient and/or Patient Representative was provided with a Choice of Provider? Patient   Freedom of Choice list was provided with basic dialogue that supports the patient's individualized plan of care/goals, treatment preferences, and shares the quality data associated with the providers? Yes   Patient did not qualify for home 02. Patient was given a [address] nebulizer. CM called patient's mother to make her aware of the transport time. [facility] RN/PT/OT has been ordered. CM remains available.

## 2023-02-06 NOTE — PROGRESS NOTES
Telemetry order expiring. Perfect serve sent to . D/C order per MD. Box #[id number] returned to telemetry.

## 2023-02-06 NOTE — PROGRESS NOTES
Pt on Room Air at rest SAT-94%. Pt up and ambulating on room air SAT-97%.   Pt seated and recovered   on Room Air SAT-94%

## 2023-02-17 ENCOUNTER — APPOINTMENT (OUTPATIENT)
Dept: GENERAL RADIOLOGY | Age: 53
DRG: 683 | End: 2023-02-17
Payer: MEDICARE

## 2023-02-17 ENCOUNTER — HOSPITAL ENCOUNTER (INPATIENT)
Age: 53
LOS: 5 days | Discharge: NURSING, REHAB OR LONG-TERM CARE | DRG: 683 | End: 2023-02-22
Attending: GENERAL PRACTICE | Admitting: INTERNAL MEDICINE
Payer: MEDICARE

## 2023-02-17 ENCOUNTER — APPOINTMENT (OUTPATIENT)
Dept: ULTRASOUND IMAGING | Age: 53
DRG: 683 | End: 2023-02-17
Payer: MEDICARE

## 2023-02-17 DIAGNOSIS — N17.9 ACUTE KIDNEY INJURY (HCC): ICD-10-CM

## 2023-02-17 DIAGNOSIS — F20.9 SCHIZOPHRENIA, UNSPECIFIED TYPE (HCC): ICD-10-CM

## 2023-02-17 DIAGNOSIS — E87.1 ACUTE HYPONATREMIA: Primary | ICD-10-CM

## 2023-02-17 LAB
ALBUMIN SERPL-MCNC: 3 G/DL (ref 3.5–5)
ALBUMIN/GLOB SERPL: 0.7 (ref 0.4–1.6)
ALP SERPL-CCNC: 82 U/L (ref 50–136)
ALT SERPL-CCNC: 25 U/L (ref 12–65)
ANION GAP SERPL CALC-SCNC: 8 MMOL/L (ref 2–11)
APPEARANCE UR: CLEAR
AST SERPL-CCNC: 83 U/L (ref 15–37)
BASOPHILS # BLD: 0.1 K/UL (ref 0–0.2)
BASOPHILS NFR BLD: 1 % (ref 0–2)
BILIRUB SERPL-MCNC: 0.5 MG/DL (ref 0.2–1.1)
BILIRUB UR QL: NEGATIVE
BUN SERPL-MCNC: 45 MG/DL (ref 6–23)
CALCIUM SERPL-MCNC: 8.9 MG/DL (ref 8.3–10.4)
CHLORIDE SERPL-SCNC: 95 MMOL/L (ref 101–110)
CO2 SERPL-SCNC: 22 MMOL/L (ref 21–32)
COLOR UR: NORMAL
CREAT SERPL-MCNC: 2.9 MG/DL (ref 0.6–1)
DIFFERENTIAL METHOD BLD: ABNORMAL
EOSINOPHIL # BLD: 0 K/UL (ref 0–0.8)
EOSINOPHIL NFR BLD: 0 % (ref 0.5–7.8)
ERYTHROCYTE [DISTWIDTH] IN BLOOD BY AUTOMATED COUNT: 14.6 % (ref 11.9–14.6)
GLOBULIN SER CALC-MCNC: 4.4 G/DL (ref 2.8–4.5)
GLUCOSE SERPL-MCNC: 109 MG/DL (ref 65–100)
GLUCOSE UR STRIP.AUTO-MCNC: NEGATIVE MG/DL
HCT VFR BLD AUTO: 34.8 % (ref 35.8–46.3)
HGB BLD-MCNC: 11.8 G/DL (ref 11.7–15.4)
HGB UR QL STRIP: NEGATIVE
IMM GRANULOCYTES # BLD AUTO: 0.3 K/UL (ref 0–0.5)
IMM GRANULOCYTES NFR BLD AUTO: 3 % (ref 0–5)
KETONES UR QL STRIP.AUTO: NEGATIVE MG/DL
LEUKOCYTE ESTERASE UR QL STRIP.AUTO: NEGATIVE
LYMPHOCYTES # BLD: 1.2 K/UL (ref 0.5–4.6)
LYMPHOCYTES NFR BLD: 11 % (ref 13–44)
MCH RBC QN AUTO: 29.6 PG (ref 26.1–32.9)
MCHC RBC AUTO-ENTMCNC: 33.9 G/DL (ref 31.4–35)
MCV RBC AUTO: 87.4 FL (ref 82–102)
MONOCYTES # BLD: 1 K/UL (ref 0.1–1.3)
MONOCYTES NFR BLD: 9 % (ref 4–12)
NEUTS SEG # BLD: 8.3 K/UL (ref 1.7–8.2)
NEUTS SEG NFR BLD: 76 % (ref 43–78)
NITRITE UR QL STRIP.AUTO: NEGATIVE
NRBC # BLD: 0 K/UL (ref 0–0.2)
OSMOLALITY SERPL: 294 MOSM/KG H2O (ref 275–295)
OSMOLALITY UR: 261 MOSM/KG H2O (ref 50–1400)
PH UR STRIP: 5 (ref 5–9)
PLATELET # BLD AUTO: 261 K/UL (ref 150–450)
PMV BLD AUTO: 10.4 FL (ref 9.4–12.3)
POTASSIUM SERPL-SCNC: 5 MMOL/L (ref 3.5–5.1)
PROT SERPL-MCNC: 7.4 G/DL (ref 6.3–8.2)
PROT UR STRIP-MCNC: NEGATIVE MG/DL
RBC # BLD AUTO: 3.98 M/UL (ref 4.05–5.2)
SODIUM SERPL-SCNC: 125 MMOL/L (ref 133–143)
SODIUM SERPL-SCNC: 131 MMOL/L (ref 133–143)
SODIUM UR-SCNC: 17 MMOL/L
SP GR UR REFRACTOMETRY: 1.01 (ref 1–1.02)
UROBILINOGEN UR QL STRIP.AUTO: 0.2 EU/DL (ref 0.2–1)
WBC # BLD AUTO: 10.8 K/UL (ref 4.3–11.1)

## 2023-02-17 PROCEDURE — 84300 ASSAY OF URINE SODIUM: CPT

## 2023-02-17 PROCEDURE — 83935 ASSAY OF URINE OSMOLALITY: CPT

## 2023-02-17 PROCEDURE — 83930 ASSAY OF BLOOD OSMOLALITY: CPT

## 2023-02-17 PROCEDURE — 2500000003 HC RX 250 WO HCPCS: Performed by: INTERNAL MEDICINE

## 2023-02-17 PROCEDURE — 84295 ASSAY OF SERUM SODIUM: CPT

## 2023-02-17 PROCEDURE — 71045 X-RAY EXAM CHEST 1 VIEW: CPT

## 2023-02-17 PROCEDURE — 1100000000 HC RM PRIVATE

## 2023-02-17 PROCEDURE — 96360 HYDRATION IV INFUSION INIT: CPT

## 2023-02-17 PROCEDURE — 81003 URINALYSIS AUTO W/O SCOPE: CPT

## 2023-02-17 PROCEDURE — 80053 COMPREHEN METABOLIC PANEL: CPT

## 2023-02-17 PROCEDURE — 76770 US EXAM ABDO BACK WALL COMP: CPT

## 2023-02-17 PROCEDURE — 2580000003 HC RX 258: Performed by: GENERAL PRACTICE

## 2023-02-17 PROCEDURE — 2580000003 HC RX 258: Performed by: INTERNAL MEDICINE

## 2023-02-17 PROCEDURE — 72170 X-RAY EXAM OF PELVIS: CPT

## 2023-02-17 PROCEDURE — 85025 COMPLETE CBC W/AUTO DIFF WBC: CPT

## 2023-02-17 PROCEDURE — 99285 EMERGENCY DEPT VISIT HI MDM: CPT

## 2023-02-17 RX ORDER — 0.9 % SODIUM CHLORIDE 0.9 %
500 INTRAVENOUS SOLUTION INTRAVENOUS ONCE
Status: COMPLETED | OUTPATIENT
Start: 2023-02-17 | End: 2023-02-18

## 2023-02-17 RX ORDER — 0.9 % SODIUM CHLORIDE 0.9 %
1000 INTRAVENOUS SOLUTION INTRAVENOUS ONCE
Status: COMPLETED | OUTPATIENT
Start: 2023-02-17 | End: 2023-02-17

## 2023-02-17 RX ORDER — ONDANSETRON 8 MG/1
4 TABLET, ORALLY DISINTEGRATING ORAL EVERY 8 HOURS PRN
Status: DISCONTINUED | OUTPATIENT
Start: 2023-02-17 | End: 2023-02-22 | Stop reason: HOSPADM

## 2023-02-17 RX ORDER — ONDANSETRON 2 MG/ML
4 INJECTION INTRAMUSCULAR; INTRAVENOUS EVERY 6 HOURS PRN
Status: DISCONTINUED | OUTPATIENT
Start: 2023-02-17 | End: 2023-02-22 | Stop reason: HOSPADM

## 2023-02-17 RX ORDER — SODIUM CHLORIDE 9 MG/ML
INJECTION, SOLUTION INTRAVENOUS CONTINUOUS
Status: DISCONTINUED | OUTPATIENT
Start: 2023-02-17 | End: 2023-02-20

## 2023-02-17 RX ORDER — ACETAMINOPHEN 325 MG/1
650 TABLET ORAL EVERY 6 HOURS PRN
Status: DISCONTINUED | OUTPATIENT
Start: 2023-02-17 | End: 2023-02-22 | Stop reason: HOSPADM

## 2023-02-17 RX ORDER — ENOXAPARIN SODIUM 100 MG/ML
40 INJECTION SUBCUTANEOUS DAILY
Status: DISCONTINUED | OUTPATIENT
Start: 2023-02-17 | End: 2023-02-17 | Stop reason: ALTCHOICE

## 2023-02-17 RX ORDER — HEPARIN SODIUM 5000 [USP'U]/ML
5000 INJECTION, SOLUTION INTRAVENOUS; SUBCUTANEOUS EVERY 8 HOURS SCHEDULED
Status: DISCONTINUED | OUTPATIENT
Start: 2023-02-17 | End: 2023-02-21

## 2023-02-17 RX ORDER — POLYETHYLENE GLYCOL 3350 17 G/17G
17 POWDER, FOR SOLUTION ORAL DAILY PRN
Status: DISCONTINUED | OUTPATIENT
Start: 2023-02-17 | End: 2023-02-22 | Stop reason: HOSPADM

## 2023-02-17 RX ORDER — SODIUM CHLORIDE 0.9 % (FLUSH) 0.9 %
5-40 SYRINGE (ML) INJECTION EVERY 12 HOURS SCHEDULED
Status: DISCONTINUED | OUTPATIENT
Start: 2023-02-17 | End: 2023-02-22 | Stop reason: HOSPADM

## 2023-02-17 RX ORDER — SODIUM CHLORIDE 9 MG/ML
INJECTION, SOLUTION INTRAVENOUS PRN
Status: DISCONTINUED | OUTPATIENT
Start: 2023-02-17 | End: 2023-02-22 | Stop reason: HOSPADM

## 2023-02-17 RX ORDER — ACETAMINOPHEN 650 MG/1
650 SUPPOSITORY RECTAL EVERY 6 HOURS PRN
Status: DISCONTINUED | OUTPATIENT
Start: 2023-02-17 | End: 2023-02-22 | Stop reason: HOSPADM

## 2023-02-17 RX ORDER — SODIUM CHLORIDE 0.9 % (FLUSH) 0.9 %
5-40 SYRINGE (ML) INJECTION PRN
Status: DISCONTINUED | OUTPATIENT
Start: 2023-02-17 | End: 2023-02-22 | Stop reason: HOSPADM

## 2023-02-17 RX ADMIN — TUBERCULIN PURIFIED PROTEIN DERIVATIVE 5 UNITS: 5 INJECTION, SOLUTION INTRADERMAL at 18:34

## 2023-02-17 RX ADMIN — SODIUM CHLORIDE: 9 INJECTION, SOLUTION INTRAVENOUS at 18:34

## 2023-02-17 RX ADMIN — SODIUM CHLORIDE 1000 ML: 9 INJECTION, SOLUTION INTRAVENOUS at 15:32

## 2023-02-17 RX ADMIN — SODIUM CHLORIDE 500 ML: 9 INJECTION, SOLUTION INTRAVENOUS at 22:09

## 2023-02-17 ASSESSMENT — PAIN - FUNCTIONAL ASSESSMENT: PAIN_FUNCTIONAL_ASSESSMENT: NONE - DENIES PAIN

## 2023-02-17 NOTE — ED PROVIDER NOTES
Emergency Department Provider Note                   PCP:                None None               Age: 45 y.o. Sex: female     No diagnosis found. DISPOSITION          Medical Decision Making  Patient presents with failure to thrive and weakness. Patient appears to have hyponatremia and acute kidney injury. Family also unable to care for the patient at home. Patient is started on IV fluids. Patient will be admitted for further work-up and likely case management to discuss disposition. Patient may need to go to a skilled nursing facility. Creatinine is 2.9 and sodium was 125. Amount and/or Complexity of Data Reviewed  Labs: ordered. Decision-making details documented in ED Course. Radiology: ordered and independent interpretation performed. Details: iimproved lung findings on CXR, no fracture on pelvis    Risk  Prescription drug management. Decision regarding hospitalization. Complexity of Problem: 2 or more acute complicated illnesses (4)  The patients assessment required an independent historian: I spoke with a parent. I have conducted an independent ordering and review of Labs. I have conducted an independent ordering and review of X-rays. I discussed study results with another external provider. I discussed disposition with another provider. Patient was admitted I have communication with admitting physician. No orders of the defined types were placed in this encounter. Medications - No data to display    New Prescriptions    No medications on file        Berto Agrawal is a 45 y.o. female who presents to the Emergency Department with chief complaint of    Chief Complaint   Patient presents with    Failure To Thrive      HPI      Review of Systems    Past Medical History:   Diagnosis Date    Psychiatric disorder         Past Surgical History:   Procedure Laterality Date    GI      umbilical hernia repair        No family history on file.      Social History Socioeconomic History    Marital status: Single   Tobacco Use    Smoking status: Never    Smokeless tobacco: Never   Substance and Sexual Activity    Alcohol use: No         Penicillins     Previous Medications    ALBUTEROL (PROVENTIL) (2.5 MG/3ML) 0.083% NEBULIZER SOLUTION    Take 3 mLs by nebulization every 6 hours as needed for Wheezing    ALPRAZOLAM (XANAX) 1 MG TABLET    Take 1 mg by mouth 4 times daily as needed for Sleep. BUDESONIDE (PULMICORT) 0.5 MG/2ML NEBULIZER SUSPENSION    Take 2 mLs by nebulization in the morning and 2 mLs in the evening. CHLORPROMAZINE (THORAZINE) 50 MG TABLET    100 mg orally in AM, and 100 mg orally in PM    DOXEPIN (SINEQUAN) 100 MG CAPSULE    Take 100 mg by mouth nightly    DULOXETINE (CYMBALTA) 60 MG EXTENDED RELEASE CAPSULE    Take 60 mg by mouth daily    ESCITALOPRAM (LEXAPRO) 20 MG TABLET    Take 20 mg by mouth daily    LISINOPRIL (PRINIVIL;ZESTRIL) 20 MG TABLET    Take 1 tablet by mouth daily    METOPROLOL TARTRATE (LOPRESSOR) 25 MG TABLET    Take 1 tablet by mouth 2 times daily    OLANZAPINE (ZYPREXA) 20 MG TABLET    Take 20 mg by mouth nightly    VALPROIC ACID (DEPAKENE) 250 MG CAPSULE    Take 1,000 mg by mouth nightly        Vitals signs and nursing note reviewed. Patient Vitals for the past 4 hrs:   Temp Pulse Resp BP SpO2   02/17/23 1227 -- -- -- 108/69 97 %   02/17/23 1225 97.6 °F (36.4 °C) 96 17 -- 97 %          Physical Exam     Procedures    No results found for any visits on 02/17/23. No orders to display                       Voice dictation software was used during the making of this note. This software is not perfect and grammatical and other typographical errors may be present. This note has not been completely proofread for errors.      Heddy Klinefelter,   02/17/23 9736

## 2023-02-17 NOTE — ED TRIAGE NOTES
Pt arrives via EMS from home. Lives with family. Hx of intellectual disabilities. Sent due to failure to thrive. No complaints. Pt is at normal baseline. 126/78. . 99% RA. Bgl 156.

## 2023-02-17 NOTE — H&P
Hospitalist History and Physical   Admit Date:  2023 12:22 PM   Name:  Sonia Calix   Age:  45 y.o. Sex:  female  :  1970   MRN:  [id number]   [facility]    Presenting Complaint: Failure To Thrive     Reason(s) for Admission: Hyponatremia [E87.1]     History of Present Illness:   Sonia Calix is a 45 y.o. female with medical history of obesity, schizophrenia who presented with concern for \"failure to thrive\". Family feels that they are no longer able to care for the patient. She has been unable to stand to walk d/t bilateral LE weakness and fatigue. She has had decreased oral intake. No fever or chills but continued cough and sputum productions. Patient recently admitted  -  for CAP versus viral pneumonia. Patient completed 7-day course of cefepime. ER workup notable for   Na 125, Cr 2.9 (baseline 0.7),   Hgb 11.8,   Cxr - bilateral infiltrates with mild improvement on the left. HM requested to admit for symptomatic hyponatremia, EDU    Assessment & Plan:     #Hyponatremia  - check urine and serum osms  - suspect hypovolemic hyponatremia vs medication induced (lots of psych meds which can contribute)  - continue NS, NA q 4hours. # Cough/sputum production   - with recent stay for PNA  - CXR slightly improved  - check covid/flu  - check procalcitonin  - defer further antbx for now    # Hypotension  - responded well to IVF  - ?intravascular depletion  - hold lisinopril, paramaters for metoprolol    #EDU  - renal ultrasound  - IVF  - hold lisinopril    #Schizophrenia  - Managed by Dr. Leoncio Lomax at [facility], diagnosed at age 13  - cont cymbalta, xanax, zyprexa, doxepin, depakene, lexapro and thorazine    # BMI 47  Needs lifestyle modicifation. .     # Failure to thrive  - PT/OT, PPD     PT/OT evals and PPD needed/ordered?   Yes    Diet: No diet orders on file  VTE prophylaxis: Lovenox  Code status: Prior    Hospital Problems:  Principal Problem:    Hyponatremia  Resolved Problems:    * No resolved hospital problems. *       Past History:     Past Medical History:   Diagnosis Date    Psychiatric disorder        Past Surgical History:   Procedure Laterality Date    GI      umbilical hernia repair        Social History     Tobacco Use    Smoking status: Never    Smokeless tobacco: Never   Substance Use Topics    Alcohol use: No      Social History     Substance and Sexual Activity   Drug Use Not on file       No family history on file. Immunization History   Administered Date(s) Administered    PPD Test 01/26/2023     Allergies   Allergen Reactions    Penicillins Hives     Prior to Admit Medications:  Current Outpatient Medications   Medication Instructions    albuterol (PROVENTIL) 2.5 mg, Nebulization, EVERY 6 HOURS PRN    ALPRAZolam (XANAX) 1 mg, Oral, 4 TIMES DAILY PRN    budesonide (PULMICORT) 500 mcg, Nebulization, 2 TIMES DAILY    chlorproMAZINE (THORAZINE) 50 MG tablet 100 mg orally in AM, and 100 mg orally in PM    doxepin (SINEQUAN) 100 mg, Oral, NIGHTLY    DULoxetine (CYMBALTA) 60 mg, Oral, DAILY    escitalopram (LEXAPRO) 20 mg, Oral, DAILY    lisinopril (PRINIVIL;ZESTRIL) 20 mg, Oral, DAILY    metoprolol tartrate (LOPRESSOR) 25 mg, Oral, 2 TIMES DAILY    OLANZapine (ZYPREXA) 20 mg, Oral, NIGHTLY    valproic acid (DEPAKENE) 1,000 mg, Oral, EVERY BEDTIME         Objective:   Patient Vitals for the past 24 hrs:   Temp Pulse Resp BP SpO2   02/17/23 1501 97.5 °F (36.4 °C) 91 18 101/67 98 %   02/17/23 1245 -- -- -- 110/65 --   02/17/23 1227 -- -- -- 108/69 97 %   02/17/23 1225 97.6 °F (36.4 °C) 96 17 -- 97 %       Oxygen Therapy  SpO2: 98 %  O2 Device: None (Room air)    Estimated body mass index is 47.5 kg/m² as calculated from the following:    Height as of 2/3/23: 5' 6\" (1.676 m). Weight as of 2/2/23: 294 lb 5 oz (133.5 kg).   No intake or output data in the 24 hours ending 02/17/23 1649      Physical Exam:    Blood pressure 101/67, pulse 91, temperature 97.5 °F (36.4 °C), temperature source Oral, resp. rate 18, SpO2 98 %. General:    Well nourished. Conversation with labile mood  Head:  Normocephalic, atraumatic  Eyes:  Sclerae appear normal.  Pupils equally round. ENT:  Nares appear normal.  Moist oral mucosa  Neck:  No restricted ROM. Trachea midline   CV:   RRR. No m/r/g. No jugular venous distension. Lungs:   CTAB. No wheezing, rhonchi, or rales. Symmetric expansion. Abdomen:   Soft, nontender, nondistended. Extremities: No cyanosis or clubbing. +2 spongy edema edema  Skin:     No rashes and normal coloration. Warm and dry. Neuro:  CN II-XII grossly intact. Sensation intact. Psych:  Normal mood and affect.       I have personally reviewed labs and tests:  Recent Labs:  Recent Results (from the past 24 hour(s))   CBC with Auto Differential    Collection Time: 02/17/23 12:39 PM   Result Value Ref Range    WBC 10.8 4.3 - 11.1 K/uL    RBC 3.98 (L) 4.05 - 5.2 M/uL    Hemoglobin 11.8 11.7 - 15.4 g/dL    Hematocrit 34.8 (L) 35.8 - 46.3 %    MCV 87.4 82 - 102 FL    MCH 29.6 26.1 - 32.9 PG    MCHC 33.9 31.4 - 35.0 g/dL    RDW 14.6 11.9 - 14.6 %    Platelets 250 507 - 293 K/uL    MPV 10.4 9.4 - 12.3 FL    nRBC 0.00 0.0 - 0.2 K/uL    Differential Type AUTOMATED      Seg Neutrophils 76 43 - 78 %    Lymphocytes 11 (L) 13 - 44 %    Monocytes 9 4.0 - 12.0 %    Eosinophils % 0 (L) 0.5 - 7.8 %    Basophils 1 0.0 - 2.0 %    Immature Granulocytes 3 0.0 - 5.0 %    Segs Absolute 8.3 (H) 1.7 - 8.2 K/UL    Absolute Lymph # 1.2 0.5 - 4.6 K/UL    Absolute Mono # 1.0 0.1 - 1.3 K/UL    Absolute Eos # 0.0 0.0 - 0.8 K/UL    Basophils Absolute 0.1 0.0 - 0.2 K/UL    Absolute Immature Granulocyte 0.3 0.0 - 0.5 K/UL   CMP    Collection Time: 02/17/23 12:39 PM   Result Value Ref Range    Sodium 125 (L) 133 - 143 mmol/L    Potassium 5.0 3.5 - 5.1 mmol/L    Chloride 95 (L) 101 - 110 mmol/L    CO2 22 21 - 32 mmol/L    Anion Gap 8 2 - 11 mmol/L    Glucose 109 (H) 65 - 100 mg/dL    BUN 45 (H) 6 - 23 MG/DL    Creatinine 2.90 (H) 0.6 - 1.0 MG/DL    Est, Glom Filt Rate 19 (L) >60 ml/min/1.73m2    Calcium 8.9 8.3 - 10.4 MG/DL    Total Bilirubin 0.5 0.2 - 1.1 MG/DL    ALT 25 12 - 65 U/L    AST 83 (H) 15 - 37 U/L    Alk Phosphatase 82 50 - 136 U/L    Total Protein 7.4 6.3 - 8.2 g/dL    Albumin 3.0 (L) 3.5 - 5.0 g/dL    Globulin 4.4 2.8 - 4.5 g/dL    Albumin/Globulin Ratio 0.7 0.4 - 1.6         I have personally reviewed imaging studies:  XR PELVIS (1-2 VIEWS)    Result Date: 2/17/2023  AP pelvis CLINICAL INDICATION: Right hip pain FINDINGS: Single AP view of the pelvis show the hip joint spaces to be well-maintained. No obvious fracture appreciated. The SI joints are intact. The pubic symphysis intact. Single AP view of the pelvis without obvious fracture or joint derangement. XR CHEST PORTABLE    Result Date: 2/17/2023  Portable chest: History: weakness Comparison: 02/03/2023 Findings: A single view of the chest was obtained at 12:39 PM. There is a relative shallow with a result, unchanged. The cardiac silhouette is mildly enlarged. Bilateral airspace opacities are once again noted with mild improvement in left perihilar region. There are no pleural effusions. Bilateral airspace opacities with mild improvement on the left. Echocardiogram:  01/26/23    TRANSTHORACIC ECHOCARDIOGRAM (TTE) COMPLETE (CONTRAST/BUBBLE/3D PRN) 02/03/2023 10:43 AM, 02/03/2023 12:00 AM (Final)    Interpretation Summary    Left Ventricle: Normal left ventricular systolic function. EF by 2D Simpsons Biplane is 65%. Left ventricle size is normal. Normal wall thickness. Normal wall motion. Normal diastolic function. Aorta: Ao root diameter is 2.7 cm. Ao Root Index is 1.14 cm/m2. Technical qualifiers: Procedure performed with the patient in a supine position, color flow Doppler was performed and pulse wave and/or continuous wave Doppler was performed. Contrast used: Definity.     Signed by: Yovana Siegel MD on 2/3/2023 10:43 AM, Signed by: Unknown Provider Result on 2/3/2023 12:00 AM        Orders Placed This Encounter   Medications    0.9 % sodium chloride bolus         Signed:  Kashmir Jimenez DO    Part of this note may have been written by using a voice dictation software. The note has been proof read but may still contain some grammatical/other typographical errors.

## 2023-02-18 LAB
ALBUMIN SERPL-MCNC: 2.6 G/DL (ref 3.5–5)
ALBUMIN/GLOB SERPL: 0.7 (ref 0.4–1.6)
ALP SERPL-CCNC: 71 U/L (ref 50–136)
ALT SERPL-CCNC: 28 U/L (ref 12–65)
ANION GAP SERPL CALC-SCNC: 5 MMOL/L (ref 2–11)
AST SERPL-CCNC: 109 U/L (ref 15–37)
BASOPHILS # BLD: 0.1 K/UL (ref 0–0.2)
BASOPHILS NFR BLD: 1 % (ref 0–2)
BILIRUB SERPL-MCNC: 0.4 MG/DL (ref 0.2–1.1)
BUN SERPL-MCNC: 33 MG/DL (ref 6–23)
CALCIUM SERPL-MCNC: 8.9 MG/DL (ref 8.3–10.4)
CHLORIDE SERPL-SCNC: 106 MMOL/L (ref 101–110)
CO2 SERPL-SCNC: 27 MMOL/L (ref 21–32)
CREAT SERPL-MCNC: 1.8 MG/DL (ref 0.6–1)
DIFFERENTIAL METHOD BLD: ABNORMAL
EKG ATRIAL RATE: 121 BPM
EKG DIAGNOSIS: NORMAL
EKG P AXIS: 65 DEGREES
EKG P-R INTERVAL: 156 MS
EKG Q-T INTERVAL: 314 MS
EKG QRS DURATION: 106 MS
EKG QTC CALCULATION (BAZETT): 445 MS
EKG R AXIS: 111 DEGREES
EKG T AXIS: 9 DEGREES
EKG VENTRICULAR RATE: 121 BPM
EOSINOPHIL # BLD: 0.1 K/UL (ref 0–0.8)
EOSINOPHIL NFR BLD: 1 % (ref 0.5–7.8)
ERYTHROCYTE [DISTWIDTH] IN BLOOD BY AUTOMATED COUNT: 15.1 % (ref 11.9–14.6)
GLOBULIN SER CALC-MCNC: 3.9 G/DL (ref 2.8–4.5)
GLUCOSE SERPL-MCNC: 100 MG/DL (ref 65–100)
HCT VFR BLD AUTO: 33.3 % (ref 35.8–46.3)
HGB BLD-MCNC: 10.6 G/DL (ref 11.7–15.4)
HIV 1+2 AB+HIV1 P24 AG SERPL QL IA: NONREACTIVE
HIV 1/2 RESULT COMMENT: NORMAL
IMM GRANULOCYTES # BLD AUTO: 0.2 K/UL (ref 0–0.5)
IMM GRANULOCYTES NFR BLD AUTO: 3 % (ref 0–5)
LYMPHOCYTES # BLD: 1.2 K/UL (ref 0.5–4.6)
LYMPHOCYTES NFR BLD: 17 % (ref 13–44)
MCH RBC QN AUTO: 29.1 PG (ref 26.1–32.9)
MCHC RBC AUTO-ENTMCNC: 31.8 G/DL (ref 31.4–35)
MCV RBC AUTO: 91.5 FL (ref 82–102)
MM INDURATION, POC: 0 MM (ref 0–5)
MONOCYTES # BLD: 0.9 K/UL (ref 0.1–1.3)
MONOCYTES NFR BLD: 13 % (ref 4–12)
NEUTS SEG # BLD: 4.6 K/UL (ref 1.7–8.2)
NEUTS SEG NFR BLD: 64 % (ref 43–78)
NRBC # BLD: 0 K/UL (ref 0–0.2)
PLATELET # BLD AUTO: 245 K/UL (ref 150–450)
PMV BLD AUTO: 10.4 FL (ref 9.4–12.3)
POTASSIUM SERPL-SCNC: 4.7 MMOL/L (ref 3.5–5.1)
PPD, POC: NEGATIVE
PROCALCITONIN SERPL-MCNC: <0.05 NG/ML (ref 0–0.49)
PROT SERPL-MCNC: 6.5 G/DL (ref 6.3–8.2)
RBC # BLD AUTO: 3.64 M/UL (ref 4.05–5.2)
SARS-COV-2 RDRP RESP QL NAA+PROBE: NOT DETECTED
SODIUM SERPL-SCNC: 136 MMOL/L (ref 133–143)
SODIUM SERPL-SCNC: 138 MMOL/L (ref 133–143)
SODIUM SERPL-SCNC: 138 MMOL/L (ref 133–143)
SODIUM SERPL-SCNC: 139 MMOL/L (ref 133–143)
SODIUM SERPL-SCNC: 142 MMOL/L (ref 133–143)
SOURCE: NORMAL
WBC # BLD AUTO: 7.2 K/UL (ref 4.3–11.1)

## 2023-02-18 PROCEDURE — 84145 PROCALCITONIN (PCT): CPT

## 2023-02-18 PROCEDURE — 97530 THERAPEUTIC ACTIVITIES: CPT

## 2023-02-18 PROCEDURE — 6360000002 HC RX W HCPCS: Performed by: INTERNAL MEDICINE

## 2023-02-18 PROCEDURE — 6370000000 HC RX 637 (ALT 250 FOR IP): Performed by: HOSPITALIST

## 2023-02-18 PROCEDURE — 80053 COMPREHEN METABOLIC PANEL: CPT

## 2023-02-18 PROCEDURE — 87040 BLOOD CULTURE FOR BACTERIA: CPT

## 2023-02-18 PROCEDURE — 1100000000 HC RM PRIVATE

## 2023-02-18 PROCEDURE — 97167 OT EVAL HIGH COMPLEX 60 MIN: CPT

## 2023-02-18 PROCEDURE — 51798 US URINE CAPACITY MEASURE: CPT

## 2023-02-18 PROCEDURE — 2580000003 HC RX 258: Performed by: INTERNAL MEDICINE

## 2023-02-18 PROCEDURE — 6370000000 HC RX 637 (ALT 250 FOR IP): Performed by: INTERNAL MEDICINE

## 2023-02-18 PROCEDURE — 87635 SARS-COV-2 COVID-19 AMP PRB: CPT

## 2023-02-18 PROCEDURE — 85025 COMPLETE CBC W/AUTO DIFF WBC: CPT

## 2023-02-18 PROCEDURE — 97162 PT EVAL MOD COMPLEX 30 MIN: CPT

## 2023-02-18 PROCEDURE — 94640 AIRWAY INHALATION TREATMENT: CPT

## 2023-02-18 PROCEDURE — 36415 COLL VENOUS BLD VENIPUNCTURE: CPT

## 2023-02-18 PROCEDURE — 97112 NEUROMUSCULAR REEDUCATION: CPT

## 2023-02-18 PROCEDURE — 97535 SELF CARE MNGMENT TRAINING: CPT

## 2023-02-18 PROCEDURE — 84295 ASSAY OF SERUM SODIUM: CPT

## 2023-02-18 PROCEDURE — 87389 HIV-1 AG W/HIV-1&-2 AB AG IA: CPT

## 2023-02-18 PROCEDURE — 93005 ELECTROCARDIOGRAM TRACING: CPT | Performed by: HOSPITALIST

## 2023-02-18 PROCEDURE — 94760 N-INVAS EAR/PLS OXIMETRY 1: CPT

## 2023-02-18 PROCEDURE — 2500000003 HC RX 250 WO HCPCS: Performed by: HOSPITALIST

## 2023-02-18 RX ORDER — DOXEPIN HYDROCHLORIDE 50 MG/1
100 CAPSULE ORAL NIGHTLY
Status: DISCONTINUED | OUTPATIENT
Start: 2023-02-18 | End: 2023-02-22 | Stop reason: HOSPADM

## 2023-02-18 RX ORDER — OLANZAPINE 5 MG/1
20 TABLET ORAL NIGHTLY
Status: DISCONTINUED | OUTPATIENT
Start: 2023-02-18 | End: 2023-02-22 | Stop reason: HOSPADM

## 2023-02-18 RX ORDER — L. ACIDOPHILUS/L.BULGARICUS 1MM CELL
2 TABLET ORAL 3 TIMES DAILY
Status: DISCONTINUED | OUTPATIENT
Start: 2023-02-18 | End: 2023-02-22 | Stop reason: HOSPADM

## 2023-02-18 RX ORDER — ALBUTEROL SULFATE 2.5 MG/3ML
2.5 SOLUTION RESPIRATORY (INHALATION) EVERY 6 HOURS PRN
Status: DISCONTINUED | OUTPATIENT
Start: 2023-02-18 | End: 2023-02-22 | Stop reason: HOSPADM

## 2023-02-18 RX ORDER — ALPRAZOLAM 0.5 MG/1
1 TABLET ORAL 4 TIMES DAILY PRN
Status: DISCONTINUED | OUTPATIENT
Start: 2023-02-18 | End: 2023-02-22 | Stop reason: HOSPADM

## 2023-02-18 RX ORDER — ESCITALOPRAM OXALATE 10 MG/1
20 TABLET ORAL DAILY
Status: DISCONTINUED | OUTPATIENT
Start: 2023-02-18 | End: 2023-02-22 | Stop reason: HOSPADM

## 2023-02-18 RX ORDER — LISINOPRIL 20 MG/1
20 TABLET ORAL DAILY
Status: DISCONTINUED | OUTPATIENT
Start: 2023-02-18 | End: 2023-02-22 | Stop reason: HOSPADM

## 2023-02-18 RX ORDER — CHLORPROMAZINE HYDROCHLORIDE 50 MG/1
100 TABLET, FILM COATED ORAL 2 TIMES DAILY
Status: DISCONTINUED | OUTPATIENT
Start: 2023-02-18 | End: 2023-02-22 | Stop reason: HOSPADM

## 2023-02-18 RX ORDER — BUDESONIDE 0.5 MG/2ML
0.5 INHALANT ORAL 2 TIMES DAILY
Status: DISCONTINUED | OUTPATIENT
Start: 2023-02-18 | End: 2023-02-22 | Stop reason: HOSPADM

## 2023-02-18 RX ORDER — DULOXETIN HYDROCHLORIDE 60 MG/1
60 CAPSULE, DELAYED RELEASE ORAL DAILY
Status: DISCONTINUED | OUTPATIENT
Start: 2023-02-18 | End: 2023-02-22 | Stop reason: HOSPADM

## 2023-02-18 RX ORDER — VALPROIC ACID 250 MG/5ML
1000 SOLUTION ORAL
Status: DISCONTINUED | OUTPATIENT
Start: 2023-02-18 | End: 2023-02-22 | Stop reason: HOSPADM

## 2023-02-18 RX ORDER — CLINDAMYCIN PHOSPHATE 600 MG/50ML
600 INJECTION INTRAVENOUS EVERY 8 HOURS
Status: DISCONTINUED | OUTPATIENT
Start: 2023-02-18 | End: 2023-02-21

## 2023-02-18 RX ORDER — CLINDAMYCIN PHOSPHATE 600 MG/50ML
600 INJECTION INTRAVENOUS EVERY 8 HOURS
Status: DISCONTINUED | OUTPATIENT
Start: 2023-02-18 | End: 2023-02-18 | Stop reason: SDUPTHER

## 2023-02-18 RX ADMIN — SODIUM CHLORIDE, PRESERVATIVE FREE 10 ML: 5 INJECTION INTRAVENOUS at 08:59

## 2023-02-18 RX ADMIN — METOPROLOL TARTRATE 25 MG: 25 TABLET, FILM COATED ORAL at 20:58

## 2023-02-18 RX ADMIN — HEPARIN SODIUM 5000 UNITS: 5000 INJECTION INTRAVENOUS; SUBCUTANEOUS at 20:58

## 2023-02-18 RX ADMIN — HEPARIN SODIUM 5000 UNITS: 5000 INJECTION INTRAVENOUS; SUBCUTANEOUS at 05:20

## 2023-02-18 RX ADMIN — SODIUM CHLORIDE, PRESERVATIVE FREE 10 ML: 5 INJECTION INTRAVENOUS at 00:33

## 2023-02-18 RX ADMIN — HEPARIN SODIUM 5000 UNITS: 5000 INJECTION INTRAVENOUS; SUBCUTANEOUS at 00:33

## 2023-02-18 RX ADMIN — SODIUM CHLORIDE: 9 INJECTION, SOLUTION INTRAVENOUS at 05:19

## 2023-02-18 RX ADMIN — METOPROLOL TARTRATE 25 MG: 25 TABLET, FILM COATED ORAL at 09:39

## 2023-02-18 RX ADMIN — CHLORPROMAZINE HYDROCHLORIDE 100 MG: 50 TABLET, FILM COATED ORAL at 11:17

## 2023-02-18 RX ADMIN — CLINDAMYCIN PHOSPHATE 600 MG: 600 INJECTION, SOLUTION INTRAVENOUS at 20:59

## 2023-02-18 RX ADMIN — Medication 2 TABLET: at 15:06

## 2023-02-18 RX ADMIN — DOXEPIN HYDROCHLORIDE 100 MG: 50 CAPSULE ORAL at 20:58

## 2023-02-18 RX ADMIN — BUDESONIDE 500 MCG: 0.5 INHALANT RESPIRATORY (INHALATION) at 07:39

## 2023-02-18 RX ADMIN — CHLORPROMAZINE HYDROCHLORIDE 100 MG: 50 TABLET, FILM COATED ORAL at 20:58

## 2023-02-18 RX ADMIN — BUDESONIDE 500 MCG: 0.5 INHALANT RESPIRATORY (INHALATION) at 20:20

## 2023-02-18 RX ADMIN — SODIUM CHLORIDE, PRESERVATIVE FREE 10 ML: 5 INJECTION INTRAVENOUS at 20:59

## 2023-02-18 RX ADMIN — VALPROIC ACID 1000 MG: 250 SOLUTION ORAL at 22:47

## 2023-02-18 RX ADMIN — DULOXETINE HYDROCHLORIDE 60 MG: 60 CAPSULE, DELAYED RELEASE ORAL at 08:59

## 2023-02-18 RX ADMIN — Medication 2 TABLET: at 20:58

## 2023-02-18 RX ADMIN — HEPARIN SODIUM 5000 UNITS: 5000 INJECTION INTRAVENOUS; SUBCUTANEOUS at 14:27

## 2023-02-18 RX ADMIN — ESCITALOPRAM OXALATE 20 MG: 10 TABLET, FILM COATED ORAL at 08:59

## 2023-02-18 RX ADMIN — OLANZAPINE 20 MG: 5 TABLET, FILM COATED ORAL at 20:58

## 2023-02-18 ASSESSMENT — PAIN SCALES - GENERAL
PAINLEVEL_OUTOF10: 0

## 2023-02-18 ASSESSMENT — PAIN DESCRIPTION - LOCATION: LOCATION: LEG

## 2023-02-18 ASSESSMENT — PAIN DESCRIPTION - ORIENTATION: ORIENTATION: RIGHT;LEFT

## 2023-02-18 NOTE — PROGRESS NOTES
ACUTE PHYSICAL THERAPY GOALS:   (Developed with and agreed upon by patient and/or caregiver.)    (1.) Sonia Calix  will move from supine to sit and sit to supine , scoot up and down, and roll side to side with CONTACT GUARD ASSIST within 7 treatment day(s). (2.) Sonia Pierrer will transfer from sitting to standing with MODERATE ASSIST using the least restrictive device within 7 treatment day(s). (2.) Sonia Jayr will transfer from bed to chair and chair to bed with MODERATE ASSIST using the least restrictive device within 7 treatment day(s). (3.) Sonia Calix will ambulate with MINIMAL ASSIST for 10 feet with the least restrictive device within 7 treatment day(s). (4.) Sonia Calix will perform standing static balance activities x 3 minutes and seated dynamic balance activities x 15 minutes  with MINIMAL ASSIST to improve safety within 7 treatment day(s). (6.) Sonia Calix will perform bilateral lower extremity exercises x 10 min for HEP with STAND BY ASSIST to improve strength, endurance, and functional mobility within 7 treatment day(s). PHYSICAL THERAPY Initial Assessment, Daily Note, and AM  (Link to Caseload Tracking: PT Visit Days : 1  Acknowledge Orders  Time In/Out  PT Charge Capture  Rehab Caseload Tracker    Sonia Calix is a 45 y.o. female   PRIMARY DIAGNOSIS: Hyponatremia  Acute hyponatremia [E87.1]  Hyponatremia [E87.1]  Acute kidney injury ([facility] [address] [N17.9]       Reason for Referral: Generalized Muscle Weakness (M62.81)  Difficulty in walking, Not elsewhere classified (R26.2)  Inpatient: Payor: Ramon Giraldo / Plan: Mal Pope / Product Type: *No Product type* /     ASSESSMENT:     REHAB RECOMMENDATIONS:   Recommendation to date pending progress:  Setting:  Short-term Rehab    Equipment:    To Be Determined     ASSESSMENT:  Ms. Silvia Gamino is a 45year old female who presents to hospital for failure to thrive and is being treated for EDU and hyponatremia. She has a history of schizophrenia with subsequent intellectual disabilities. Her mom is present at bedside and gives most info for PLOF. At baseline patient lives at home with her mom who serves as her primary giver. Mom reports patient has been primarily bed bound over the last month (following hospitalization) with several falls requiring EMS to assist back. However prior to the last month patient was MI with mobility using no AD but did still require some supervision and IADL assistance. Today patient performs bed mobility with modAx2 with extra assistance required due to impulsivity and safety deficits. Patient very anxious with mood fluctuating between calm and fearful throughout time seated on EOB. She has bouts of quick posterior lean due to fear but is able to eventually maintain midline sitting with cueing and reassurance. She presents with severe swelling in BLE that effect her overall mobility. She requires heavy cueing and frequent redirection throughout session. Pt presents as functioning below her baseline, with deficits in mobility including transfers, gait, balance, and activity tolerance. Pt will benefit from skilled therapy services to address stated deficits to promote return to highest level of function, independence, and safety. Will continue to follow.       [facility] [address] [address] AM-PAC 6 Clicks Basic Mobility Inpatient Short Form  AM-PAC Basic Mobility - Inpatient   How much help is needed turning from your back to your side while in a flat bed without using bedrails?: A Lot  How much help is needed moving from lying on your back to sitting on the side of a flat bed without using bedrails?: A Lot  How much help is needed moving to and from a bed to a chair?: A Lot  How much help is needed standing up from a chair using your arms?: A Lot  How much help is needed walking in hospital room?: A Lot  How much help is needed climbing 3-5 steps with a railing?: Total  -PAC Inpatient Mobility Raw Score : 11  AM-PAC Inpatient T-Scale Score : 33.86  Mobility Inpatient CMS 0-100% Score: 72.57  Mobility Inpatient CMS G-Code Modifier : CL    SUBJECTIVE:   Ms. Desiree Chase states, \"You like snakes and spiders? \"     Social/Functional Lives With: Parent  Type of Home: House  Home Layout: One level  Home Access: Ramped entrance  Bathroom Shower/Tub: Walk-in shower  Has the patient had two or more falls in the past year or any fall with injury in the past year?: Yes  ADL Assistance: Needs assistance  Homemaking Assistance: Needs assistance    OBJECTIVE:     PAIN: Darrius Benjamin / O2: Doherty Brittney / Ronaldo Kaufman / Charmayne Big:   Pre Treatment: 0/10         Post Treatment: 0/10 Vitals        Oxygen      Purewick    RESTRICTIONS/PRECAUTIONS:  Restrictions/Precautions: Fall Risk                 GROSS EVALUATION: Intact Impaired (Comments):   AROM [x]     PROM [x]    Strength []  Grossly 3+/5 in BLE (limited by edema)   Balance [] Sitting - Static: Fair  Sitting - Dynamic: Fair   Posture [] Forward Head  Rounded Shoulders   Sensation []     Coordination []      Tone []     Edema []    Activity Tolerance []  Limited     []      COGNITION/  PERCEPTION: Intact Impaired (Comments):   Orientation [x]     Vision [x]     Hearing [x]     Cognition  []  Deficits in safety, childlike behavio; frequent redirection      MOBILITY: I Mod I S SBA CGA Min Mod Max Total  NT x2 Comments:   Bed Mobility    Rolling [] [] [] [] [] [] [x] [] [] [] [x]    Supine to Sit [] [] [] [] [] [] [x] [x] [] [] [x]    Scooting [] [] [] [] [] [] [x] [] [] [] [x]    Sit to Supine [] [] [] [] [] [] [x] [] [] [] [x]    Transfers    Sit to Stand [] [] [] [] [] [] [] [] [] [] []    Bed to Chair [] [] [] [] [] [] [] [] [] [] []    Stand to Sit [] [] [] [] [] [] [] [] [] [] []     [] [] [] [] [] [] [] [] [] [] []    I=Independent, Mod I=Modified Independent, S=Supervision, SBA=Standby Assistance, CGA=Contact Guard Assistance,   Min=Minimal Assistance, Mod=Moderate Assistance, Max=Maximal Assistance, Total=Total Assistance, NT=Not Tested    GAIT: I Mod I S SBA CGA Min Mod Max Total  NT x2 Comments:   Level of Assistance [] [] [] [] [] [] [] [] [] [] []    Distance   feet    DME N/A    Gait Quality N/A    Weightbearing Status Restrictions/Precautions  Restrictions/Precautions: Fall Risk    Stairs      I=Independent, Mod I=Modified Independent, S=Supervision, SBA=Standby Assistance, CGA=Contact Guard Assistance,   Min=Minimal Assistance, Mod=Moderate Assistance, Max=Maximal Assistance, Total=Total Assistance, NT=Not Tested    PLAN:   FREQUENCY AND DURATION: 3 times/week for duration of hospital stay or until stated goals are met, whichever comes first.    THERAPY PROGNOSIS: Fair    PROBLEM LIST:   (Skilled intervention is medically necessary to address:)  Decreased Activity Tolerance  Decreased AROM/PROM  Decreased Balance  Decreased Cognition  Decreased Coordination  Decreased Gait Ability  Decreased Safety Awareness  Decreased Strength  Decreased Transfer Abilities  Increased Pain INTERVENTIONS PLANNED:   (Benefits and precautions of physical therapy have been discussed with the patient.)  Therapeutic Activity  Therapeutic Exercise/HEP  Neuromuscular Re-education  Gait Training  Education       TREATMENT:   EVALUATION: MODERATE COMPLEXITY: (Untimed Charge)    TREATMENT:   Co-Treatment PT/OT necessary due to patient's decreased overall endurance/tolerance levels, as well as need for high level skilled assistance to complete functional transfers/mobility and functional tasks  Therapeutic Activity (20 Minutes): Therapeutic activity included Rolling, Supine to Sit, Sit to Supine, Scooting, and Sitting balance  to improve functional Activity tolerance, Balance, Coordination, Mobility, and Strength.     TREATMENT GRID:  N/A    AFTER TREATMENT PRECAUTIONS: Alarm Activated, Bed, Bed/Chair Locked, Call light within reach, Needs within reach, RN at bedside, and Visitors at bedside    INTERDISCIPLINARY COLLABORATION:  RN/ PCT, PT/ PTA, and OT/ SUN    EDUCATION: Education Given To: Patient; Family  Education Provided: Role of Therapy;Plan of Care  Education Method: Demonstration;Verbal  Barriers to Learning: Cognition  Education Outcome: Verbalized understanding;Continued education needed    TIME IN/OUT:  Time In: 5251  Time Out: 0879  Minutes: [facility] Str[facility], [place]

## 2023-02-18 NOTE — PROGRESS NOTES
TRANSFER - IN REPORT:    Verbal report received from [facility] on [address]  being received from ED for routine progression of patient care      Report consisted of patient's Situation, Background, Assessment and   Recommendations(SBAR). Information from the following report(s) Nurse Handoff Report was reviewed with the receiving nurse. Opportunity for questions and clarification was provided. Assessment completed upon patient's arrival to unit and care assumed.

## 2023-02-18 NOTE — PROGRESS NOTES
Hospitalist Progress Note   Admit Date:  2023 12:22 PM   Name:  Fe Ogden   Age:  45 y.o. Sex:  female  :  1970   MRN:  [id number]   Room:  [facility]    Presenting Complaint: Failure To Thrive     Reason(s) for Admission: Acute hyponatremia [E87.1]  Hyponatremia [E87.1]  Acute kidney injury ([address] [N17.9]       Assessment & Plan:     Hyponatremia-resolved  - checking urine and serum osmolality  - suspect hypovolemic hyponatremia vs medication induced (lots of psych meds which can contribute)  - Her SSRI can contribute to hyponatremia  - Resolved if volume OK will DC IV NS  - Volume negative we will continue IV fluids    Cough/sputum production  - Recent stay for PNA  - Was influenza positive on 2023  - CXR slightly improved at the time of admission from prior  - checked covid/flu  - check procalcitonin  - defer further antbx for now  - CXR sl enlargement  - Recent echo normal s and d fxn with BNP > 900  - Check HIV and nasal MRSA  - Drug screen  - Check PPD test results tomorrow    Troponin mildly elevated  - See above recent echo no WMA and no card sx  - Will check 12 lead  - Likely due to EDU and not clearing    Hypotension  - responded well to IVF  - I agree that she was intravascularly depleted  - hold lisinopril, paramaters for metoprolol  - Echocardiogram is normal    EDU  - renal ultrasound wnl  - Creatinine 2.8-->1.8  - IVF but watch for overload still volume low  - Reduce IVF rate  - holding lisinopril  - Monitor I/O  - Scan to make sure she is not retaining    Schizophrenia  Unable to ambulate  - Managed by Dr. Bessie Acevedo at [facility], diagnosed at age 13  - Continue Cymbalta, xanax, zyprexa, doxepin, depakene, lexapro and thorazine  - Ambulation difficulty may be due to psychiatric issues or cellulitis    BMI 47  Tobacco Use disorder? - Needs lifestyle modicifation.     Cellulitis   - Bilateral, but it looks like true cellulitis  - Start antibiotics allergies to usual antibiotics start clindamycin  - Start probiotic    Failure to thrive  - PT/OT, PPD     PT/OT evals and PPD needed/ordered? Yes    Diet: ADULT DIET; Regular  VTE prophylaxis: Lovenox  Code status: Full Code    Hospital Problems:  Principal Problem:    Hyponatremia  Active Problems:    Schizophrenia ([facility] [address]    Morbid obesity ([facility] [address]  Resolved Problems:    * No resolved hospital problems. *     History of Present Illness:   Karin Cook is a 45 y.o. female with medical history of obesity, schizophrenia who presented with concern for \"failure to thrive\". Family feels that they are no longer able to care for the patient. She has been unable to stand to walk d/t bilateral LE weakness and fatigue. She has had decreased oral intake. No fever or chills but continued cough and sputum productions. Patient recently admitted 1/26 - 2/6 for CAP versus viral pneumonia. Patient completed 7-day course of cefepime. Her mother feels she was discharged too early. Has not been able to walk apparently since discharge after admission for pneumonia. I did explain that there is a difference between clinical resolution and radiologic resolution. Past History:     Past Medical History:   Diagnosis Date    Psychiatric disorder        Past Surgical History:   Procedure Laterality Date    GI      umbilical hernia repair        Social History     Tobacco Use    Smoking status: Never    Smokeless tobacco: Never   Substance Use Topics    Alcohol use: No      Social History     Substance and Sexual Activity   Drug Use Not on file       No family history on file.      Immunization History   Administered Date(s) Administered    PPD Test 01/26/2023, 02/17/2023     Allergies   Allergen Reactions    Penicillins Hives     Prior to Admit Medications:  Current Outpatient Medications   Medication Instructions    albuterol (PROVENTIL) 2.5 mg, Nebulization, EVERY 6 HOURS PRN    ALPRAZolam (XANAX) 1 mg, Oral, 4 TIMES DAILY PRN    budesonide (PULMICORT) 500 mcg, Nebulization, 2 TIMES DAILY    chlorproMAZINE (THORAZINE) 50 MG tablet 100 mg orally in AM, and 100 mg orally in PM    doxepin (SINEQUAN) 100 mg, Oral, NIGHTLY    DULoxetine (CYMBALTA) 60 mg, Oral, DAILY    escitalopram (LEXAPRO) 20 mg, Oral, DAILY    lisinopril (PRINIVIL;ZESTRIL) 20 mg, Oral, DAILY    metoprolol tartrate (LOPRESSOR) 25 mg, Oral, 2 TIMES DAILY    OLANZapine (ZYPREXA) 20 mg, Oral, NIGHTLY    valproic acid (DEPAKENE) 1,000 mg, Oral, EVERY BEDTIME         Objective:   Patient Vitals for the past 24 hrs:   Temp Pulse Resp BP SpO2   02/18/23 1214 98.6 °F (37 °C) 54 18 112/63 90 %   02/18/23 0939 -- (!) 121 -- (!) 125/106 --   02/18/23 0743 -- (!) 108 16 -- 95 %   02/18/23 0708 99.1 °F (37.3 °C) (!) 110 18 (!) 125/106 96 %   02/18/23 0330 97.7 °F (36.5 °C) (!) 102 18 (!) 96/54 96 %   02/18/23 0001 97.8 °F (36.6 °C) 100 20 (!) 128/55 93 %   02/17/23 2300 -- 95 19 90/72 95 %   02/17/23 2215 -- 97 18 (!) 101/57 99 %   02/17/23 2210 -- -- -- 89/62 97 %   02/17/23 2145 -- -- -- 113/82 --   02/17/23 2115 -- 93 16 (!) 78/61 98 %   02/17/23 2110 -- -- -- (!) 100/56 --   02/17/23 2100 -- -- -- (!) 80/55 --   02/17/23 2050 -- -- -- 101/75 99 %   02/17/23 2010 -- -- -- (!) 86/59 --   02/17/23 1940 -- -- -- 80/64 --   02/17/23 1750 -- -- -- 88/67 --   02/17/23 1730 -- -- -- 87/76 --   02/17/23 1700 -- -- -- (!) 151/113 --   02/17/23 1630 -- -- -- (!) 99/54 --   02/17/23 1610 -- -- -- 106/61 --   02/17/23 1600 -- -- -- 106/76 --   02/17/23 1550 -- -- -- 94/63 97 %   02/17/23 1501 97.5 °F (36.4 °C) 91 18 101/67 98 %       Oxygen Therapy  SpO2: 90 %  Pulse via Oximetry: 97 beats per minute  Pulse Oximeter Device Mode: Intermittent  O2 Device: None (Room air)    Estimated body mass index is 49.28 kg/m² as calculated from the following:    Height as of this encounter: 5' 6\" (1.676 m). Weight as of this encounter: 305 lb 5.4 oz (138.5 kg).     Intake/Output Summary (Last 24 hours) at 2/18/2023 9573  Last data filed at 2/18/2023 0708  Gross per 24 hour   Intake --   Output 1600 ml   Net -1600 ml         Physical Exam:    Blood pressure 112/63, pulse 54, temperature 98.6 °F (37 °C), temperature source Axillary, resp. rate 18, height 5' 6\" (1.676 m), weight (!) 305 lb 5.4 oz (138.5 kg), SpO2 90 %. General:    Obese, in no distress  Head:  Normocephalic, atraumatic  Eyes:  Sclerae appear normal.  Pupils equally round. ENT:  Normal exam  Neck:  Supple with no masses nodes or tenderness  CV:   S1-S2 no murmurs rubs or gallops, supine so could not properly check neck veins  Lungs:   Bilateral rhonchi, no rales, good air entry  Abdomen:   Soft, nontender, nondistended, obese.   Extremities: No cyanosis or clubbing. +2 spongy edema edema, bilateral distal cellulitis changes with redness, increased heat and some bullous formation  Skin:     As above with soles of the feet macerated from urine soaking  Neuro:  Cranial nerves II through XII intact, power and tone seem normal  Psych:  Anxious and emotional    I have personally reviewed labs and tests:  Recent Labs:  Recent Results (from the past 24 hour(s))   Osmolality    Collection Time: 02/17/23  6:42 PM   Result Value Ref Range    Serum Osmolality 294 275 - 295 MOSM/kg H2O   Urinalysis    Collection Time: 02/17/23  8:49 PM   Result Value Ref Range    Color, UA YELLOW/STRAW      Appearance CLEAR      Specific [place], UA 1.011 1.001 - 1.023      pH, Urine 5.0 5.0 - 9.0      Protein, UA Negative NEG mg/dL    Glucose, UA Negative mg/dL    Ketones, Urine Negative NEG mg/dL    Bilirubin Urine Negative NEG      Blood, Urine Negative NEG      Urobilinogen, Urine 0.2 0.2 - 1.0 EU/dL    Nitrite, Urine Negative NEG      Leukocyte Esterase, Urine Negative NEG     Sodium    Collection Time: 02/17/23  8:49 PM   Result Value Ref Range    Sodium 131 (L) 133 - 143 mmol/L   Sodium, urine, random    Collection Time: 02/17/23  8:49 PM   Result Value Ref Range    SODIUM, RANDOM URINE 17 MMOL/L Osmolality, Urine    Collection Time: 02/17/23  8:49 PM   Result Value Ref Range    Osmolality, Ur 261 50 - 1400 MOSM/kg H2O   Sodium    Collection Time: 02/18/23  1:28 AM   Result Value Ref Range    Sodium 136 133 - 143 mmol/L   COVID-19, Rapid    Collection Time: 02/18/23  1:48 AM    Specimen: Nasopharyngeal   Result Value Ref Range    Source NASAL O2      SARS-CoV-2, Rapid Not detected NOTD     Comprehensive Metabolic Panel w/ Reflex to MG    Collection Time: 02/18/23  5:47 AM   Result Value Ref Range    Sodium 138 133 - 143 mmol/L    Potassium 4.7 3.5 - 5.1 mmol/L    Chloride 106 101 - 110 mmol/L    CO2 27 21 - 32 mmol/L    Anion Gap 5 2 - 11 mmol/L    Glucose 100 65 - 100 mg/dL    BUN 33 (H) 6 - 23 MG/DL    Creatinine 1.80 (H) 0.6 - 1.0 MG/DL    Est, Glom Filt Rate 33 (L) >60 ml/min/1.73m2    Calcium 8.9 8.3 - 10.4 MG/DL    Total Bilirubin 0.4 0.2 - 1.1 MG/DL    ALT 28 12 - 65 U/L     (H) 15 - 37 U/L    Alk Phosphatase 71 50 - 136 U/L    Total Protein 6.5 6.3 - 8.2 g/dL    Albumin 2.6 (L) 3.5 - 5.0 g/dL    Globulin 3.9 2.8 - 4.5 g/dL    Albumin/Globulin Ratio 0.7 0.4 - 1.6     CBC with Auto Differential    Collection Time: 02/18/23  5:47 AM   Result Value Ref Range    WBC 7.2 4.3 - 11.1 K/uL    RBC 3.64 (L) 4.05 - 5.2 M/uL    Hemoglobin 10.6 (L) 11.7 - 15.4 g/dL    Hematocrit 33.3 (L) 35.8 - 46.3 %    MCV 91.5 82 - 102 FL    MCH 29.1 26.1 - 32.9 PG    MCHC 31.8 31.4 - 35.0 g/dL    RDW 15.1 (H) 11.9 - 14.6 %    Platelets 003 045 - 993 K/uL    MPV 10.4 9.4 - 12.3 FL    nRBC 0.00 0.0 - 0.2 K/uL    Differential Type AUTOMATED      Seg Neutrophils 64 43 - 78 %    Lymphocytes 17 13 - 44 %    Monocytes 13 (H) 4.0 - 12.0 %    Eosinophils % 1 0.5 - 7.8 %    Basophils 1 0.0 - 2.0 %    Immature Granulocytes 3 0.0 - 5.0 %    Segs Absolute 4.6 1.7 - 8.2 K/UL    Absolute Lymph # 1.2 0.5 - 4.6 K/UL    Absolute Mono # 0.9 0.1 - 1.3 K/UL    Absolute Eos # 0.1 0.0 - 0.8 K/UL    Basophils Absolute 0.1 0.0 - 0.2 K/UL Absolute Immature Granulocyte 0.2 0.0 - 0.5 K/UL   Procalcitonin    Collection Time: 02/18/23  5:47 AM   Result Value Ref Range    Procalcitonin <0.05 0.00 - 0.49 ng/mL   EKG 12 Lead    Collection Time: 02/18/23  8:40 AM   Result Value Ref Range    Ventricular Rate 121 BPM    Atrial Rate 121 BPM    P-R Interval 156 ms    QRS Duration 106 ms    Q-T Interval 314 ms    QTc Calculation (Bazett) 445 ms    P Axis 65 degrees    R Axis 111 degrees    T Axis 9 degrees    Diagnosis Sinus tachycardia    Sodium    Collection Time: 02/18/23  9:10 AM   Result Value Ref Range    Sodium 138 133 - 143 mmol/L   HIV 1/2 Ag/Ab, 4TH Generation,W Rflx Confirm    Collection Time: 02/18/23  9:10 AM   Result Value Ref Range    HIV 1/2 Interp NONREACTIVE NR      HIV 1/2 Result Comment SEE NOTE         I have personally reviewed imaging studies:  XR PELVIS (1-2 VIEWS)    Result Date: 2/17/2023  AP pelvis CLINICAL INDICATION: Right hip pain FINDINGS: Single AP view of the pelvis show the hip joint spaces to be well-maintained. No obvious fracture appreciated. The SI joints are intact. The pubic symphysis intact. Single AP view of the pelvis without obvious fracture or joint derangement. XR CHEST PORTABLE    Result Date: 2/17/2023  Portable chest: History: weakness Comparison: 02/03/2023 Findings: A single view of the chest was obtained at 12:39 PM. There is a relative shallow with a result, unchanged. The cardiac silhouette is mildly enlarged. Bilateral airspace opacities are once again noted with mild improvement in left perihilar region. There are no pleural effusions. Bilateral airspace opacities with mild improvement on the left. Echocardiogram:  01/26/23    TRANSTHORACIC ECHOCARDIOGRAM (TTE) COMPLETE (CONTRAST/BUBBLE/3D PRN) 02/03/2023 10:43 AM, 02/03/2023 12:00 AM (Final)    Interpretation Summary    Left Ventricle: Normal left ventricular systolic function. EF by 2D Simpsons Biplane is 65%.  Left ventricle size is normal. Normal wall thickness. Normal wall motion. Normal diastolic function. Aorta: Ao root diameter is 2.7 cm. Ao Root Index is 1.14 cm/m2. Technical qualifiers: Procedure performed with the patient in a supine position, color flow Doppler was performed and pulse wave and/or continuous wave Doppler was performed. Contrast used: Definity.     Signed by: Althea Lopez MD on 2/3/2023 10:43 AM, Signed by: Unknown Provider Result on 2/3/2023 12:00 AM        Orders Placed This Encounter   Medications    0.9 % sodium chloride bolus    albuterol (PROVENTIL) nebulizer solution 2.5 mg     Order Specific Question:   Initiate RT Bronchodilator Protocol     Answer:   Yes - Inpatient Protocol    ALPRAZolam (XANAX) tablet 1 mg    budesonide (PULMICORT) nebulizer suspension 500 mcg    chlorproMAZINE (THORAZINE) tablet 100 mg    doxepin (SINEQUAN) capsule 100 mg    DULoxetine (CYMBALTA) extended release capsule 60 mg    escitalopram (LEXAPRO) tablet 20 mg    lisinopril (PRINIVIL;ZESTRIL) tablet 20 mg    metoprolol tartrate (LOPRESSOR) tablet 25 mg    OLANZapine (ZYPREXA) tablet 20 mg    valproic acid (DEPAKENE) 250 MG/5ML oral solution 1,000 mg    0.9 % sodium chloride infusion    tuberculin injection 5 Units    sodium chloride flush 0.9 % injection 5-40 mL    sodium chloride flush 0.9 % injection 5-40 mL    0.9 % sodium chloride infusion    DISCONTD: enoxaparin (LOVENOX) injection 40 mg     Order Specific Question:   Indication of Use     Answer:   Prophylaxis-DVT/PE    OR Linked Order Group     ondansetron (ZOFRAN-ODT) disintegrating tablet 4 mg     ondansetron (ZOFRAN) injection 4 mg    polyethylene glycol (GLYCOLAX) packet 17 g    OR Linked Order Group     acetaminophen (TYLENOL) tablet 650 mg     acetaminophen (TYLENOL) suppository 650 mg    heparin (porcine) injection 5,000 Units    0.9 % sodium chloride bolus    clindamycin (CLEOCIN) 600 mg in dextrose 5 % 50 mL IVPB     Order Specific Question:   Antimicrobial Indications     Answer:   Skin and Soft Tissue Infection     Order Specific Question:   Skin duration of therapy     Answer:   5 days    lactobacillus acidophilus Miguel Smith) 2 tablet       Signed:  Davy Fajardo MD    Part of this note may have been written by using a voice dictation software. The note has been proof read but may still contain some grammatical/other typographical errors.

## 2023-02-18 NOTE — PROGRESS NOTES
Comprehensive Nutrition Assessment    Type and Reason for Visit: Initial, Positive Nutrition Screen  Malnutrition Screening Tool: Malnutrition Screen  Have you recently lost weight without trying?: 2 to 13 pounds (1 point)  Have you been eating poorly because of a decreased appetite?: Yes (1 point)  Malnutrition Screening Tool Score: 2    Nutrition Recommendations/Plan:   Meals and Snacks:  Diet: Continue current order  Nutrition Supplement Therapy:  Medical food supplement therapy:  Discontinue Ensure Enlive as pt currently meeting needs via PO intake     Malnutrition Assessment:  Malnutrition Status: Insufficient data  No martín signs of fat or muscle wasting noted    Nutrition Assessment:  Nutrition History: Pt reports she was eating well PTA, however cannot verify hx as pt is unreliable historian. Per last admission weight of 296lb 2/4/2022.      Do You Have Any Cultural, Jew, or Ethnic Food Preferences?: No   Nutrition Background:    Pt with PMH significant for obesity, schizophrenia who presented with concern for \"failure to thrive\". Family feels that they are no longer able to care for the patient. She has been unable to stand to walk d/t bilateral LE weakness and fatigue 2/17.   Nutrition Interval:  Pt seen at bedside, no visitors present. Pt asking RD \"do you have any pets?\" Pt easily redirected, only states she ate well for breakfast and lunch. Lunch tray observed with 90% consumed. Based on intake, pt does not require supplements to meet needs.    Current Nutrition Therapies:  ADULT DIET; Regular  ADULT ORAL NUTRITION SUPPLEMENT; Breakfast, Lunch, Dinner; Standard High Calorie/High Protein Oral Supplement    Current Intake:   Average Meal Intake: % Average Supplements Intake: None Ordered      Anthropometric Measures:  Height: 5' 6\" (167.6 cm)  Current Body Wt: 305 lb 5.4 oz (138.5 kg), Weight source: Not Specified  BMI: 49.3, Obese Class 3 (BMI 40.0 or greater)     Ideal Body Weight (Kg)  (Calculated): 59 kg (130 lbs), 234.9 %  Usual Body Wt: 325 lb (147.4 kg) (MD office weight 5/2021 ; limited weight hx), Percent weight change: -6.1       BMI Category Obese Class 3 (BMI 40.0 or greater)    Estimated Daily Nutrient Needs:  Energy (kcal/day): 6325-3942 (11-14kcal/kg) (Kcal/kg Weight used: 138.5 kg Current  Protein (g/day): 76-97 (20% estimated kcal needs) Weight Used: (Current) 138.5 kg  Fluid (ml/day):   (1 ml/kcal)    Nutrition Diagnosis:   No nutrition diagnosis at this time     Nutrition Interventions:   Food and/or Nutrient Delivery: Continue Current Diet, Discontinue Oral Nutrition Supplement     Coordination of Nutrition Care: Continue to monitor while inpatient       Goals:       Active Goal:  (Continue to meet >75% of estimated nutrition needs via PO intake)       Nutrition Monitoring and Evaluation:      Food/Nutrient Intake Outcomes: Food and Nutrient Intake  Physical Signs/Symptoms Outcomes: Meal Time Behavior, Weight    Discharge Planning:    No discharge needs at this time    Cristopher Escobar RD, LD  Contact: [phone]

## 2023-02-18 NOTE — PROGRESS NOTES
ACUTE OCCUPATIONAL THERAPY GOALS:   (Developed with and agreed upon by patient and/or caregiver.)  1. Patient will complete lower body bathing and dressing with Min A and adaptive equipment as   needed. 2. Patient will completed upper body bathing and dressing with SBA and adaptive equipment as needed. 3. Patient will complete grooming seated at sink with SBA and adaptive equipment as needed. 4. Patient will complete toileting with Min A and adaptive equipment as needed. 5. Patient will tolerate 30 minutes of OT treatment with 1-2 rest breaks to increase activity tolerance for ADLs. 6. Patient will complete functional transfers with CGA and adaptive equipment as needed. Timeframe: 7 visits       OCCUPATIONAL THERAPY Initial Assessment, Daily Note, and AM       OT Visit Days: 1  Acknowledge Orders  Time  OT Charge Capture  Rehab Caseload Tracker      Chris De Anda is a 45 y.o. female   PRIMARY DIAGNOSIS: Hyponatremia  Acute hyponatremia [E87.1]  Hyponatremia [E87.1]  Acute kidney injury ([address] [N17.9]       Reason for Referral: Generalized Muscle Weakness (M62.81)  Other abnormalities of gait and mobility (R26.89)  Repeated Falls (R29.6)  History of falling (Z91.81)  Inpatient: Payor: Niki Srinivasan / Plan: Julienne Lagos / Product Type: *No Product type* /     ASSESSMENT:     REHAB RECOMMENDATIONS:   Recommendation to date pending progress:  Setting:  Short-term Rehab    Equipment:    To Be Determined     ASSESSMENT:  Ms. Rupa Brooks is a 46 y/o F presenting with hyponatremia. Pt supine on entry on RA with mother present, A/O x 3. Applicable PMHx: schizophrenia. Per NSG and observable presentation pt presents with child-like cognitive state and significantly increased anxiety with any movement while rapidly changing from fear to seeking approval from mother. This proved mild barrier to session, significantly increased time and cues required to get pt safely seated EOB.  Pt denied light headedness, dizziness or SOB. Pt mother reports 4 fall(s) in past 6 months. PLOF Mod I 4wks ago per mother, past 4 wks bed-ridden d/t severe edema and cellulitis in BLE. Per mother, family experiencing increased difficulty caring for pt at present. Pt mother presents with swelling in LLE and increased swelling in RLE requiring both slip-on shoes to be cut and RLE presenting with compression wrap. DME present in home; RW, ramp. Pt currently Min A with UB ADLs, Total - Max A with LB ADLs, Max A for toileting and Mod-Min A x1-2 bed mobility (for safety secondary to impulsivity) for bed mobility. Increased rest break duration and frequency required throughout session. Pt presents with deficits in ADLs, functional t/fs, household mobility for ADLs and activity tolerance compared to reported PLOF. Pt tolerated session fair. Pt presents impulsive and anxious but pleasant and cooperative with good rehab potential. Pt would benefit from continued skilled OT services for duration of hospital stay and after t/f to next level of care or until all stated goals met. [facility] Daily Activity Inpatient Short Form:    AM-PAC Daily Activity - Inpatient   How much help is needed for putting on and taking off regular lower body clothing?: Total  How much help is needed for bathing (which includes washing, rinsing, drying)?: A Lot  How much help is needed for toileting (which includes using toilet, bedpan, or urinal)?: A Lot  How much help is needed for putting on and taking off regular upper body clothing?: A Little  How much help is needed for taking care of personal grooming?: A Little  How much help for eating meals?: None  [facility] Inpatient Daily Activity Raw Score: 15  AM-PAC Inpatient ADL T-Scale Score : 34.69  ADL Inpatient CMS 0-100% Score: 56.46  ADL Inpatient CMS G-Code Modifier : CK           SUBJECTIVE:     Ms. Steffen Vasquez states, \"you got a rodolfo cat? \"     Social/Functional Lives With: Parent  Type of Home: House  Home Layout: One level  Home Access: Ramped entrance  Bathroom Shower/Tub: Walk-in shower  Has the patient had two or more falls in the past year or any fall with injury in the past year?: Yes  ADL Assistance: Needs assistance  Homemaking Assistance: Needs assistance    OBJECTIVE:     Rita Huston / Arabella Severino / Matthew Madridal: Florina Goddard    RESTRICTIONS/PRECAUTIONS:       PAIN: VITALS / O2:   Pre Treatment:   Pain Assessment: 0-10  Pain Level:  (pt reports increased pain with movement of BLE, no numerical value pt unable to clarify.  Pt shouting appeared related more towards anxiety of falling/having to stand)  Pain Location: Leg  Pain Orientation: Right;Left  Non-Pharmaceutical Pain Intervention(s): Repositioned;Rest;Emotional support  Response to Pain Intervention: Patient satisfied      Post Treatment: None reported resting supine in bed       Vitals          Oxygen            GROSS EVALUATION: INTACT IMPAIRED   (See Comments)   UE AROM [x] []   UE PROM [] []N/T   Strength [] LUE Strength  Gross LUE Strength: WFL  LUE Strength Comment: 3/5  RUE Strength  Gross RUE Strength: WFL  RUE Strength Comment: 3/5     Posture / Balance [] Sitting - Static: Fair  Sitting - Dynamic: Fair   Sensation []  Difficulty assessing secondary to cognition   Coordination []  Generally decreased     Tone []  N/A     Edema [] Severe edema noted in BLE, especially B/L lower leg distal knee, proximal to ankle   Activity Tolerance [] Patient limited by fatigue, Patient limited by pain, Treatment limited secondary to decreased cognition     Hand Dominance R [x] L []      COGNITION/  PERCEPTION: INTACT IMPAIRED   (See Comments)   Orientation [x]     Vision [x]     Hearing [x]     Cognition  []  Hx cognitive deficits, presents with cognitive status of adolescent, impulsivity, severe anxiety/fear of falling, significantly increased verbal cues, rapid fluctuation in attention    Perception []       MOBILITY: I Mod I S SBA CGA Min Mod Max Total  NT x2 Comments:   Bed Mobility    Rolling [] [] [] [] [] [x] [x] [] [] [] [x] X1-2   Supine to Sit [] [] [] [] [] [x] [x] [] [] [] [x] X1-2   Scooting [] [] [] [] [] [x] [x] [] [] [] [x] X1-2   Sit to Supine [] [] [] [] [] [x] [x] [] [] [] [x] X1-2   Transfers    Sit to Stand [] [] [] [] [] [] [] [] [] [x] []    Bed to Chair [] [] [] [] [] [] [] [] [] [x] []    Stand to Sit [] [] [] [] [] [] [] [] [] [x] []    Tub/Shower [] [] [] [] [] [] [] [] [] [x] []     Toilet [] [] [] [] [] [] [] [] [] [x] []      [] [] [] [] [] [] [] [] [] [] []    I=Independent, Mod I=Modified Independent, S=Supervision/Setup, SBA=Standby Assistance, CGA=Contact Guard Assistance, Min=Minimal Assistance, Mod=Moderate Assistance, Max=Maximal Assistance, Total=Total Assistance, NT=Not Tested    ACTIVITIES OF DAILY LIVING: I Mod I S SBA CGA Min Mod Max Total NT Comments   BASIC ADLs:              Upper Body Bathing  [] [] [] [] [] [x] [] [] [] [] BUE washing seated EOB with increased verbal and visual cues   Lower Body Bathing [] [] [] [] [] [] [] [x] [] []    Toileting [] [] [] [] [] [] [] [x] [] []    Upper Body Dressing [] [] [] [] [] [x] [] [] [] []    Lower Body Dressing [] [] [] [] [] [] [] [] [x] []    Feeding [] [x] [] [] [] [] [] [] [] []    Grooming [] [] [] [] [] [x] [] [] [] [] Face washing seated EOB with increased verbal and visual cues   Personal Device Care [] [] [] [] [] [] [] [] [] [x]    Functional Mobility [] [] [] [] [] [x] [x] [] [] [] X1-2 bed mobility only for safety secondary to increased impulsivity and anxiety/fear of falling   I=Independent, Mod I=Modified Independent, S=Supervision/Setup, SBA=Standby Assistance, CGA=Contact Guard Assistance, Min=Minimal Assistance, Mod=Moderate Assistance, Max=Maximal Assistance, Total=Total Assistance, NT=Not Tested    PLAN:   FREQUENCY/DURATION   OT Plan of Care: 3 times/week for duration of hospital stay or until stated goals are met, whichever comes first.    PROBLEM LIST:   (Skilled intervention is medically necessary to address:)  Decreased ADL/Functional Activities  Decreased Activity Tolerance  Decreased AROM/PROM  Decreased Balance  Decreased Cognition  Decreased Coordination  Decreased Gait Ability  Decreased Safety Awareness  Decreased Strength  Decreased Transfer Abilities   INTERVENTIONS PLANNED:  (Benefits and precautions of occupational therapy have been discussed with the patient.)  Self Care Training  Therapeutic Activity  Therapeutic Exercise/HEP  Neuromuscular Re-education  Manual Therapy  Education         TREATMENT:     EVALUATION: HIGH COMPLEXITY: (Untimed Charge)    TREATMENT:   Co-Treatment PT/OT necessary due to patient's decreased overall endurance/tolerance levels, as well as need for high level skilled assistance to complete functional transfers/mobility and functional tasks  Neuromuscular Re-education (15 Minutes): Patient participated in neuromuscular re-education including functional reaching, weight shifting, postural training, and sitting balance activity   with maximal verbal cues, tactile cues, and visual cues to improve sitting balance, posture, coordination, static balance, and dynamic balance in order to prepare for functional task, prepare for seated ADLs, prepare for functional transfer, and increase safety awareness. Self Care (8 minutes): Patient participated in upper body bathing and grooming ADLs in supported sitting and unsupported sitting with maximal visual and verbal cueing to increase independence, decrease assistance required, increase activity tolerance, and increase safety awareness. Patient also participated in functional mobility, functional transfer, and energy conservation training to increase independence, decrease assistance required, increase activity tolerance, and increase safety awareness.      TREATMENT GRID:  N/A    AFTER TREATMENT PRECAUTIONS: Alarm Activated, Bed, Bed/Chair Locked, Call light within reach, Needs within reach, RN notified, and Visitors at bedside    INTERDISCIPLINARY COLLABORATION:  RN/ PCT, PT/ PTA, and OT/ SUN    EDUCATION:  Education Given To: Patient; Family  Education Provided: Role of Therapy;Plan of Care;Energy Conservation; Family Education  Education Method: Verbal  Barriers to Learning: Cognition  Education Outcome: Continued education needed    TOTAL TREATMENT DURATION AND TIME:  Time In: 7900  Time Out: 7105  Minutes: [facility], OT

## 2023-02-19 LAB
ALBUMIN SERPL-MCNC: 2.4 G/DL (ref 3.5–5)
ALBUMIN/GLOB SERPL: 0.6 (ref 0.4–1.6)
ALP SERPL-CCNC: 62 U/L (ref 50–136)
ALT SERPL-CCNC: 26 U/L (ref 12–65)
ANION GAP SERPL CALC-SCNC: 4 MMOL/L (ref 2–11)
AST SERPL-CCNC: 76 U/L (ref 15–37)
BASOPHILS # BLD: 0.1 K/UL (ref 0–0.2)
BASOPHILS NFR BLD: 1 % (ref 0–2)
BILIRUB SERPL-MCNC: 0.3 MG/DL (ref 0.2–1.1)
BUN SERPL-MCNC: 27 MG/DL (ref 6–23)
CALCIUM SERPL-MCNC: 9 MG/DL (ref 8.3–10.4)
CHLORIDE SERPL-SCNC: 114 MMOL/L (ref 101–110)
CO2 SERPL-SCNC: 26 MMOL/L (ref 21–32)
CREAT SERPL-MCNC: 1.1 MG/DL (ref 0.6–1)
DIFFERENTIAL METHOD BLD: ABNORMAL
EOSINOPHIL # BLD: 0.1 K/UL (ref 0–0.8)
EOSINOPHIL NFR BLD: 1 % (ref 0.5–7.8)
ERYTHROCYTE [DISTWIDTH] IN BLOOD BY AUTOMATED COUNT: 15.9 % (ref 11.9–14.6)
GLOBULIN SER CALC-MCNC: 3.8 G/DL (ref 2.8–4.5)
GLUCOSE SERPL-MCNC: 91 MG/DL (ref 65–100)
HCT VFR BLD AUTO: 32.3 % (ref 35.8–46.3)
HGB BLD-MCNC: 10.2 G/DL (ref 11.7–15.4)
IMM GRANULOCYTES # BLD AUTO: 0.3 K/UL (ref 0–0.5)
IMM GRANULOCYTES NFR BLD AUTO: 4 % (ref 0–5)
LYMPHOCYTES # BLD: 2 K/UL (ref 0.5–4.6)
LYMPHOCYTES NFR BLD: 28 % (ref 13–44)
MCH RBC QN AUTO: 29.1 PG (ref 26.1–32.9)
MCHC RBC AUTO-ENTMCNC: 31.6 G/DL (ref 31.4–35)
MCV RBC AUTO: 92 FL (ref 82–102)
MM INDURATION, POC: 0 MM (ref 0–5)
MONOCYTES # BLD: 0.9 K/UL (ref 0.1–1.3)
MONOCYTES NFR BLD: 13 % (ref 4–12)
NEUTS SEG # BLD: 3.7 K/UL (ref 1.7–8.2)
NEUTS SEG NFR BLD: 53 % (ref 43–78)
NRBC # BLD: 0 K/UL (ref 0–0.2)
PLATELET # BLD AUTO: 244 K/UL (ref 150–450)
PMV BLD AUTO: 11.2 FL (ref 9.4–12.3)
POTASSIUM SERPL-SCNC: 4.9 MMOL/L (ref 3.5–5.1)
PPD, POC: NEGATIVE
PROT SERPL-MCNC: 6.2 G/DL (ref 6.3–8.2)
RBC # BLD AUTO: 3.51 M/UL (ref 4.05–5.2)
SODIUM SERPL-SCNC: 138 MMOL/L (ref 133–143)
SODIUM SERPL-SCNC: 140 MMOL/L (ref 133–143)
SODIUM SERPL-SCNC: 143 MMOL/L (ref 133–143)
SODIUM SERPL-SCNC: 143 MMOL/L (ref 133–143)
SODIUM SERPL-SCNC: 144 MMOL/L (ref 133–143)
WBC # BLD AUTO: 7 K/UL (ref 4.3–11.1)

## 2023-02-19 PROCEDURE — 6360000002 HC RX W HCPCS: Performed by: INTERNAL MEDICINE

## 2023-02-19 PROCEDURE — 94640 AIRWAY INHALATION TREATMENT: CPT

## 2023-02-19 PROCEDURE — 84295 ASSAY OF SERUM SODIUM: CPT

## 2023-02-19 PROCEDURE — 2580000003 HC RX 258: Performed by: HOSPITALIST

## 2023-02-19 PROCEDURE — 2500000003 HC RX 250 WO HCPCS: Performed by: HOSPITALIST

## 2023-02-19 PROCEDURE — 80053 COMPREHEN METABOLIC PANEL: CPT

## 2023-02-19 PROCEDURE — 1100000000 HC RM PRIVATE

## 2023-02-19 PROCEDURE — 85025 COMPLETE CBC W/AUTO DIFF WBC: CPT

## 2023-02-19 PROCEDURE — 2580000003 HC RX 258: Performed by: INTERNAL MEDICINE

## 2023-02-19 PROCEDURE — 6370000000 HC RX 637 (ALT 250 FOR IP): Performed by: HOSPITALIST

## 2023-02-19 PROCEDURE — 94760 N-INVAS EAR/PLS OXIMETRY 1: CPT

## 2023-02-19 PROCEDURE — 6370000000 HC RX 637 (ALT 250 FOR IP): Performed by: INTERNAL MEDICINE

## 2023-02-19 PROCEDURE — 36415 COLL VENOUS BLD VENIPUNCTURE: CPT

## 2023-02-19 RX ADMIN — ANTI-FUNGAL POWDER MICONAZOLE NITRATE TALC FREE: 1.42 POWDER TOPICAL at 21:30

## 2023-02-19 RX ADMIN — DOXEPIN HYDROCHLORIDE 100 MG: 50 CAPSULE ORAL at 21:30

## 2023-02-19 RX ADMIN — CLINDAMYCIN PHOSPHATE 600 MG: 600 INJECTION, SOLUTION INTRAVENOUS at 13:00

## 2023-02-19 RX ADMIN — HEPARIN SODIUM 5000 UNITS: 5000 INJECTION INTRAVENOUS; SUBCUTANEOUS at 21:30

## 2023-02-19 RX ADMIN — BUDESONIDE 500 MCG: 0.5 INHALANT RESPIRATORY (INHALATION) at 07:34

## 2023-02-19 RX ADMIN — CHLORPROMAZINE HYDROCHLORIDE 100 MG: 50 TABLET, FILM COATED ORAL at 09:15

## 2023-02-19 RX ADMIN — OLANZAPINE 20 MG: 5 TABLET, FILM COATED ORAL at 21:30

## 2023-02-19 RX ADMIN — Medication 2 TABLET: at 21:36

## 2023-02-19 RX ADMIN — METOPROLOL TARTRATE 25 MG: 25 TABLET, FILM COATED ORAL at 21:30

## 2023-02-19 RX ADMIN — HEPARIN SODIUM 5000 UNITS: 5000 INJECTION INTRAVENOUS; SUBCUTANEOUS at 13:50

## 2023-02-19 RX ADMIN — CLINDAMYCIN PHOSPHATE 600 MG: 600 INJECTION, SOLUTION INTRAVENOUS at 20:00

## 2023-02-19 RX ADMIN — SODIUM CHLORIDE, PRESERVATIVE FREE 10 ML: 5 INJECTION INTRAVENOUS at 09:16

## 2023-02-19 RX ADMIN — CHLORPROMAZINE HYDROCHLORIDE 100 MG: 50 TABLET, FILM COATED ORAL at 21:30

## 2023-02-19 RX ADMIN — SODIUM CHLORIDE: 9 INJECTION, SOLUTION INTRAVENOUS at 05:33

## 2023-02-19 RX ADMIN — ESCITALOPRAM OXALATE 20 MG: 10 TABLET, FILM COATED ORAL at 09:16

## 2023-02-19 RX ADMIN — Medication 2 TABLET: at 09:16

## 2023-02-19 RX ADMIN — SODIUM CHLORIDE, PRESERVATIVE FREE 10 ML: 5 INJECTION INTRAVENOUS at 21:00

## 2023-02-19 RX ADMIN — BUDESONIDE 500 MCG: 0.5 INHALANT RESPIRATORY (INHALATION) at 20:28

## 2023-02-19 RX ADMIN — CLINDAMYCIN PHOSPHATE 600 MG: 600 INJECTION, SOLUTION INTRAVENOUS at 04:08

## 2023-02-19 RX ADMIN — METOPROLOL TARTRATE 25 MG: 25 TABLET, FILM COATED ORAL at 09:16

## 2023-02-19 RX ADMIN — HEPARIN SODIUM 5000 UNITS: 5000 INJECTION INTRAVENOUS; SUBCUTANEOUS at 05:21

## 2023-02-19 RX ADMIN — DULOXETINE HYDROCHLORIDE 60 MG: 60 CAPSULE, DELAYED RELEASE ORAL at 09:16

## 2023-02-19 RX ADMIN — Medication 2 TABLET: at 13:50

## 2023-02-19 ASSESSMENT — PAIN SCALES - GENERAL: PAINLEVEL_OUTOF10: 0

## 2023-02-19 NOTE — PROGRESS NOTES
Pt denies needs at this time. EKG completed and provider is aware of results. Pt has had two BM's thus far during shift. Blanchable redness noted to pt's buttocks and skin is flaky. Zinc cream applied after hygiene. Redness noted under breast folds and abd pannus. No open areas noted at this time. Bladder scanned performed per order 0mls noted. External catheter intact with urine draining into cannister but pt is also voiding a lot around the catheter on to the bed. Bed in low position, locked and call light/personal items within reach. Will continue to monitor and report to [facility], RN.

## 2023-02-19 NOTE — PROGRESS NOTES
Hospitalist Progress Note   Admit Date:  2023 12:22 PM   Name:  Leona Pacheco   Age:  45 y.o. Sex:  female  :  1970   MRN:  [id number]   Room:  [facility]    Presenting Complaint: Failure To Thrive     Reason(s) for Admission: Acute hyponatremia [E87.1]  Hyponatremia [E87.1]  Acute kidney injury ([address] [N17.9]       Assessment & Plan:     Hyponatremia-resolved  - checked urine and serum osmolality  - suspected hypovolemic hyponatremia vs medication induced  - Her SSRI can contribute to hyponatremia  - Resolved if volume OK will hold IV NS    Cough/sputum production  - Recent stay for PNA  - Was influenza positive on 2023  - CXR slightly improved at the time of admission from prior  - checked covid/flu  - checked procalcitonin neg  - defered further antbx for now but started for cellulitis  - CXR sl enlargement  - Recent echo normal systolic and diastolic fxn with BNP > 236  - HIV not reactive and nasal MRSA still not completed  - Drug screen  - Check PPD test results today    Troponin mildly elevated  - See above recent echo no WMA and no card symptoms  - Will check 12 lead  - Likely due to EDU and not clearing    Hypotension  - responded well to IVF  - I agree that she was intravascularly depleted  - hold lisinopril, paramaters for metoprolol  - Echocardiogram is normal  - Check AM Cortisol    EDU  - renal ultrasound wnl  - Creatinine 2.8-->1.8-->1.1  - IVF hold  - holding lisinopril   - Monitor I/O  - Bladder scan to make sure she is not retaining    Anemia  - Slight decrease during her admission  - No overt evidence of GIB  - Cover with GI prophylaxis  - Monitor    Schizophrenia  Unable to ambulate  - Managed by Dr. Alecia Sandhoff at [facility], diagnosed at age 13  - Continue Cymbalta, xanax, zyprexa, doxepin, depakene, lexapro and thorazine  - Ambulation difficulty may be due to psychiatric issues or cellulitis    BMI 47  Tobacco Use disorder? - Needs lifestyle modicifation.     Cellulitis Severe edema (likely lymphedema) of lower limbs  - Bilateral, but it looked like true cellulitis  - Started antibiotics yesterday   - Allergies to usual antibiotics started clindamycin  - Looks much better today. Would favor a very short course. - Started probiotic yesterday    Failure to thrive  - PT/OT, PPD     PT/OT evals and PPD needed/ordered? Yes    Diet: ADULT DIET; Regular  VTE prophylaxis: Lovenox  Code status: Full Code    Hospital Problems:  Principal Problem:    Hyponatremia  Active Problems:    Schizophrenia ([facility]    Morbid obesity ([facility]  Resolved Problems:    * No resolved hospital problems. *     Subjective / History:   From [facility]: Yvette Reynoso is a 45 y.o. female with medical history of obesity, schizophrenia who presented with concern for \"failure to thrive\". Family feels that they are no longer able to care for the patient. She has been unable to stand to walk d/t bilateral LE weakness and fatigue. She has had decreased oral intake. No fever or chills but continued cough and sputum productions. 2/18: Patient recently admitted 1/26 - 2/6 for CAP versus viral pneumonia. Patient completed 7-day course of cefepime. Her mother feels she was discharged too early. Has not been able to walk apparently since discharge after admission for pneumonia. I did explain that there is a difference between clinical resolution and radiologic resolution. 2/19: The patient's mother was not in the room today. Patient says her legs were feeling better. She is worried about having to have her legs amputated. I reassured her that that was not in the plan.       Allergies   Allergen Reactions    Penicillins Hives   Possibly Ceph    Prior to Admit Medications:  Current Outpatient Medications   Medication Instructions    albuterol (PROVENTIL) 2.5 mg, Nebulization, EVERY 6 HOURS PRN    ALPRAZolam (XANAX) 1 mg, Oral, 4 TIMES DAILY PRN    budesonide (PULMICORT) 500 mcg, Nebulization, 2 TIMES DAILY chlorproMAZINE (THORAZINE) 50 MG tablet 100 mg orally in AM, and 100 mg orally in PM    doxepin (SINEQUAN) 100 mg, Oral, NIGHTLY    DULoxetine (CYMBALTA) 60 mg, Oral, DAILY    escitalopram (LEXAPRO) 20 mg, Oral, DAILY    lisinopril (PRINIVIL;ZESTRIL) 20 mg, Oral, DAILY    metoprolol tartrate (LOPRESSOR) 25 mg, Oral, 2 TIMES DAILY    OLANZapine (ZYPREXA) 20 mg, Oral, NIGHTLY    valproic acid (DEPAKENE) 1,000 mg, Oral, EVERY BEDTIME         Objective:   Patient Vitals for the past 24 hrs:   Temp Pulse Resp BP SpO2   02/19/23 1230 98.5 °F (36.9 °C) 94 20 (!) 90/59 99 %   02/19/23 0734 -- (!) 110 22 -- 93 %   02/19/23 0731 98.1 °F (36.7 °C) (!) 111 20 (!) 116/97 --   02/19/23 0438 98.3 °F (36.8 °C) 96 18 92/63 96 %   02/18/23 2316 97.8 °F (36.6 °C) 94 18 (!) 96/57 98 %   02/18/23 2153 -- 99 18 -- 96 %   02/18/23 2058 -- (!) 101 -- -- --   02/18/23 1933 98.2 °F (36.8 °C) (!) 102 18 104/65 100 %   02/18/23 1607 99 °F (37.2 °C) (!) 106 18 107/73 95 %       Oxygen Therapy  SpO2: 99 %  Pulse via Oximetry: 97 beats per minute  Pulse Oximeter Device Mode: Intermittent  O2 Device: None (Room air)    Estimated body mass index is 49.28 kg/m² as calculated from the following:    Height as of this encounter: 5' 6\" (1.676 m). Weight as of this encounter: 305 lb 5.4 oz (138.5 kg). Intake/Output Summary (Last 24 hours) at 2/19/2023 1340  Last data filed at 2/19/2023 0912  Gross per 24 hour   Intake 270 ml   Output 900 ml   Net -630 ml         Physical Exam:    Blood pressure (!) 90/59, pulse 94, temperature 98.5 °F (36.9 °C), temperature source Oral, resp. rate 20, height 5' 6\" (1.676 m), weight (!) 305 lb 5.4 oz (138.5 kg), SpO2 99 %. General:    Obese 58-year-old lady in no distress but anxious  Head:  Normocephalic, atraumatic  Eyes:  Sclerae appear normal.  Pupils are round and reactive  ENT:  Nares appear normal.  Moist oral mucosa  Neck:  No restricted ROM. CV:   RRR. No m/r/g. No jugular venous distension.    Lungs: Some rhonchi bilaterally anteriorly and laterally with overall good air entry  Abdomen:   Soft, nontender, nondistended, morbidly obese. Extremities: No cyanosis or clubbing. +2 spongy edema edema  Skin:     Cellulitis significantly improved lower limbs, quite severe brawny edema, trophic skin changes, some bullous formation, feet macerated prior soaking in urine. Right heel degradation. Skin is generally in poor condition. Neuro:  CN II-XII grossly intact.   Grossly intact and nonfocal  Psych:  Consistent with schizophrenia    I have personally reviewed labs and tests:  Recent Labs:  Recent Results (from the past 24 hour(s))   Sodium    Collection Time: 02/18/23  3:12 PM   Result Value Ref Range    Sodium 139 133 - 143 mmol/L   Sodium    Collection Time: 02/18/23  9:19 PM   Result Value Ref Range    Sodium 142 133 - 143 mmol/L   Sodium    Collection Time: 02/19/23  1:33 AM   Result Value Ref Range    Sodium 143 133 - 143 mmol/L   Sodium    Collection Time: 02/19/23  6:03 AM   Result Value Ref Range    Sodium 143 133 - 143 mmol/L   CBC with Auto Differential    Collection Time: 02/19/23  6:03 AM   Result Value Ref Range    WBC 7.0 4.3 - 11.1 K/uL    RBC 3.51 (L) 4.05 - 5.2 M/uL    Hemoglobin 10.2 (L) 11.7 - 15.4 g/dL    Hematocrit 32.3 (L) 35.8 - 46.3 %    MCV 92.0 82 - 102 FL    MCH 29.1 26.1 - 32.9 PG    MCHC 31.6 31.4 - 35.0 g/dL    RDW 15.9 (H) 11.9 - 14.6 %    Platelets 512 451 - 451 K/uL    MPV 11.2 9.4 - 12.3 FL    nRBC 0.00 0.0 - 0.2 K/uL    Differential Type AUTOMATED      Seg Neutrophils 53 43 - 78 %    Lymphocytes 28 13 - 44 %    Monocytes 13 (H) 4.0 - 12.0 %    Eosinophils % 1 0.5 - 7.8 %    Basophils 1 0.0 - 2.0 %    Immature Granulocytes 4 0.0 - 5.0 %    Segs Absolute 3.7 1.7 - 8.2 K/UL    Absolute Lymph # 2.0 0.5 - 4.6 K/UL    Absolute Mono # 0.9 0.1 - 1.3 K/UL    Absolute Eos # 0.1 0.0 - 0.8 K/UL    Basophils Absolute 0.1 0.0 - 0.2 K/UL    Absolute Immature Granulocyte 0.3 0.0 - 0.5 K/UL Comprehensive Metabolic Panel    Collection Time: 02/19/23  6:03 AM   Result Value Ref Range    Sodium 144 (H) 133 - 143 mmol/L    Potassium 4.9 3.5 - 5.1 mmol/L    Chloride 114 (H) 101 - 110 mmol/L    CO2 26 21 - 32 mmol/L    Anion Gap 4 2 - 11 mmol/L    Glucose 91 65 - 100 mg/dL    BUN 27 (H) 6 - 23 MG/DL    Creatinine 1.10 (H) 0.6 - 1.0 MG/DL    Est, Glom Filt Rate 60 (L) >60 ml/min/1.73m2    Calcium 9.0 8.3 - 10.4 MG/DL    Total Bilirubin 0.3 0.2 - 1.1 MG/DL    ALT 26 12 - 65 U/L    AST 76 (H) 15 - 37 U/L    Alk Phosphatase 62 50 - 136 U/L    Total Protein 6.2 (L) 6.3 - 8.2 g/dL    Albumin 2.4 (L) 3.5 - 5.0 g/dL    Globulin 3.8 2.8 - 4.5 g/dL    Albumin/Globulin Ratio 0.6 0.4 - 1.6     Sodium    Collection Time: 02/19/23 10:53 AM   Result Value Ref Range    Sodium 140 133 - 143 mmol/L       I have personally reviewed imaging studies:  XR PELVIS (1-2 VIEWS)    Result Date: 2/17/2023  AP pelvis CLINICAL INDICATION: Right hip pain FINDINGS: Single AP view of the pelvis show the hip joint spaces to be well-maintained. No obvious fracture appreciated. The SI joints are intact. The pubic symphysis intact. Single AP view of the pelvis without obvious fracture or joint derangement. XR CHEST PORTABLE    Result Date: 2/17/2023  Portable chest: History: weakness Comparison: 02/03/2023 Findings: A single view of the chest was obtained at 12:39 PM. There is a relative shallow with a result, unchanged. The cardiac silhouette is mildly enlarged. Bilateral airspace opacities are once again noted with mild improvement in left perihilar region. There are no pleural effusions. Bilateral airspace opacities with mild improvement on the left. Echocardiogram:  01/26/23    TRANSTHORACIC ECHOCARDIOGRAM (TTE) COMPLETE (CONTRAST/BUBBLE/3D PRN) 02/03/2023 10:43 AM, 02/03/2023 12:00 AM (Final)    Interpretation Summary    Left Ventricle: Normal left ventricular systolic function. EF by 2D Simpsons Biplane is 65%.  Left ventricle size is normal. Normal wall thickness. Normal wall motion. Normal diastolic function. Aorta: Ao root diameter is 2.7 cm. Ao Root Index is 1.14 cm/m2. Technical qualifiers: Procedure performed with the patient in a supine position, color flow Doppler was performed and pulse wave and/or continuous wave Doppler was performed. Contrast used: Definity.     Signed by: Javier Nicolas MD on 2/3/2023 10:43 AM, Signed by: Unknown Provider Result on 2/3/2023 12:00 AM        Orders Placed This Encounter   Medications    0.9 % sodium chloride bolus    albuterol (PROVENTIL) nebulizer solution 2.5 mg     Order Specific Question:   Initiate RT Bronchodilator Protocol     Answer:   Yes - Inpatient Protocol    ALPRAZolam (XANAX) tablet 1 mg    budesonide (PULMICORT) nebulizer suspension 500 mcg    chlorproMAZINE (THORAZINE) tablet 100 mg    doxepin (SINEQUAN) capsule 100 mg    DULoxetine (CYMBALTA) extended release capsule 60 mg    escitalopram (LEXAPRO) tablet 20 mg    lisinopril (PRINIVIL;ZESTRIL) tablet 20 mg    metoprolol tartrate (LOPRESSOR) tablet 25 mg    OLANZapine (ZYPREXA) tablet 20 mg    valproic acid (DEPAKENE) 250 MG/5ML oral solution 1,000 mg    0.9 % sodium chloride infusion    tuberculin injection 5 Units    sodium chloride flush 0.9 % injection 5-40 mL    sodium chloride flush 0.9 % injection 5-40 mL    0.9 % sodium chloride infusion    DISCONTD: enoxaparin (LOVENOX) injection 40 mg     Order Specific Question:   Indication of Use     Answer:   Prophylaxis-DVT/PE    OR Linked Order Group     ondansetron (ZOFRAN-ODT) disintegrating tablet 4 mg     ondansetron (ZOFRAN) injection 4 mg    polyethylene glycol (GLYCOLAX) packet 17 g    OR Linked Order Group     acetaminophen (TYLENOL) tablet 650 mg     acetaminophen (TYLENOL) suppository 650 mg    heparin (porcine) injection 5,000 Units    0.9 % sodium chloride bolus    DISCONTD: clindamycin (CLEOCIN) 600 mg in dextrose 5 % 50 mL IVPB     Order Specific Question:   Antimicrobial Indications     Answer:   Skin and Soft Tissue Infection     Order Specific Question:   Skin duration of therapy     Answer:   5 days    lactobacillus acidophilus (FLORANEX) 2 tablet    clindamycin (CLEOCIN) 600 mg in dextrose 5 % 50 mL IVPB     Order Specific Question:   Antimicrobial Indications     Answer:   Skin and Soft Tissue Infection     Order Specific Question:   Skin duration of therapy     Answer:   5 days       Signed:  Adrien Kaufman MD    Part of this note may have been written by using a voice dictation software. The note has been proof read but may still contain some grammatical/other typographical errors.

## 2023-02-19 NOTE — PLAN OF CARE
Problem: Discharge Planning  Goal: Discharge to home or other facility with appropriate resources  2/19/2023 0809 by Darlin Dowling RN  Outcome: Progressing  2/19/2023 0319 by Vita Haji RN  Outcome: Progressing     Problem: Skin/Tissue Integrity  Goal: Absence of new skin breakdown  Description: 1. Monitor for areas of redness and/or skin breakdown  2. Assess vascular access sites hourly  3. Every 4-6 hours minimum:  Change oxygen saturation probe site  4. Every 4-6 hours:  If on nasal continuous positive airway pressure, respiratory therapy assess nares and determine need for appliance change or resting period.   2/19/2023 0809 by Darlin Dowling RN  Outcome: Progressing  2/19/2023 0319 by Vita Haji RN  Outcome: Progressing     Problem: Skin/Tissue Integrity - Adult  Goal: Skin integrity remains intact  2/19/2023 0319 by Vita Haji RN  Outcome: Progressing

## 2023-02-19 NOTE — CARE COORDINATION
Pt chart reviewed for discharge planning. LMSW met with pt to discuss care planning. Pt normally lives with and is supported by her Mother, Marcus Chandra [phone]. Pt is known to CM from previous care and has been followed in home by [facility]. Pt has now been recommended for rehab and pt reviewed SNF list and LMSW also discussed referral with pt's Mother by phone at pt's request.  Will place referrals to [facility] [address] and [facility]. CM will follow pt plan of care and assist with further with supportive care referrals pending pt clinical progress. Please consult case management if additional needs arise. 02/19/23 9740   Service Assessment   Patient Orientation Alert and Oriented   Cognition Alert   History Provided By Patient;Medical Record   Primary Caregiver Family  (Mother)   Support Systems Parent; Family Members   Patient's [address] is: Legal Next of Kin  (Mother)   PCP Verified by CM Yes   Last Visit to PCP Within last 3 months   Current Functional Level Assistance with the following:;Mobility   Can patient return to prior living arrangement Other (see comment)  (rehab recommended)   Ability to make needs known: Good   Family able to assist with home care needs: Yes   Would you like for me to discuss the discharge plan with any other family members/significant others, and if so, who? Yes  (Mother)   Financial Resources Medicare  [facility] Medicare)   Social/Functional History   Lives With Parent   Type of [address] One level   Home Equipment Walker, standard   Receives Help From Family   ADL Assistance Needs assistance   Homemaking Responsibilities Yes   Ambulation Assistance Needs assistance   Transfer Assistance Independent   Active  No   Discharge Planning   Type of Residence House   Living Arrangements Parent   Current Services Prior To Admission [facility]  (SNF rehab)   DME Ordered?  No   Potential Assistance Purchasing Medications No   Type of Home Care Services PT;OT   Patient expects to be discharged to: Skilled nursing facility  (SNF STR)   Services At/After Discharge   Transition of Care Consult (CM Consult) Discharge Planning;SNF   Services At/After Discharge [facility] (SNF); Transport   [employer] Information Provided? No   Mode of Transport at Discharge BLS   Confirm Follow Up Transport Family   Condition of Participation: Discharge Planning   The Plan for Transition of Care is related to the following treatment goals: Pt will need rehab to return to the community at her functional baseline. The Patient and/or Patient Representative was provided with a Choice of Provider? Patient   The Patient and/Or Patient Representative agree with the Discharge Plan? Yes   Freedom of Choice list was provided with basic dialogue that supports the patient's individualized plan of care/goals, treatment preferences, and shares the quality data associated with the providers?   Yes

## 2023-02-20 LAB
ALBUMIN SERPL-MCNC: 2.3 G/DL (ref 3.5–5)
ALBUMIN/GLOB SERPL: 0.7 (ref 0.4–1.6)
ALP SERPL-CCNC: 59 U/L (ref 50–136)
ALT SERPL-CCNC: 26 U/L (ref 12–65)
AMPHET UR QL SCN: NEGATIVE
ANION GAP SERPL CALC-SCNC: 3 MMOL/L (ref 2–11)
AST SERPL-CCNC: 47 U/L (ref 15–37)
BACTERIA SPEC CULT: NORMAL
BARBITURATES UR QL SCN: NEGATIVE
BASOPHILS # BLD: 0.1 K/UL (ref 0–0.2)
BASOPHILS NFR BLD: 1 % (ref 0–2)
BENZODIAZ UR QL: NEGATIVE
BILIRUB SERPL-MCNC: 0.2 MG/DL (ref 0.2–1.1)
BUN SERPL-MCNC: 21 MG/DL (ref 6–23)
CALCIUM SERPL-MCNC: 8.7 MG/DL (ref 8.3–10.4)
CANNABINOIDS UR QL SCN: NEGATIVE
CHLORIDE SERPL-SCNC: 109 MMOL/L (ref 101–110)
CO2 SERPL-SCNC: 28 MMOL/L (ref 21–32)
COCAINE UR QL SCN: NEGATIVE
CORTIS AM PEAK SERPL-MCNC: 11.1 UG/DL (ref 7–25)
CREAT SERPL-MCNC: 0.9 MG/DL (ref 0.6–1)
DIFFERENTIAL METHOD BLD: ABNORMAL
EOSINOPHIL # BLD: 0.2 K/UL (ref 0–0.8)
EOSINOPHIL NFR BLD: 3 % (ref 0.5–7.8)
ERYTHROCYTE [DISTWIDTH] IN BLOOD BY AUTOMATED COUNT: 15.5 % (ref 11.9–14.6)
FLUAV RNA SPEC QL NAA+PROBE: NOT DETECTED
FLUBV RNA SPEC QL NAA+PROBE: NOT DETECTED
GLOBULIN SER CALC-MCNC: 3.2 G/DL (ref 2.8–4.5)
GLUCOSE SERPL-MCNC: 95 MG/DL (ref 65–100)
HCT VFR BLD AUTO: 29.7 % (ref 35.8–46.3)
HGB BLD-MCNC: 9.7 G/DL (ref 11.7–15.4)
IMM GRANULOCYTES # BLD AUTO: 0.2 K/UL (ref 0–0.5)
IMM GRANULOCYTES NFR BLD AUTO: 3 % (ref 0–5)
LYMPHOCYTES # BLD: 2 K/UL (ref 0.5–4.6)
LYMPHOCYTES NFR BLD: 31 % (ref 13–44)
MCH RBC QN AUTO: 29.8 PG (ref 26.1–32.9)
MCHC RBC AUTO-ENTMCNC: 32.7 G/DL (ref 31.4–35)
MCV RBC AUTO: 91.1 FL (ref 82–102)
METHADONE UR QL: NEGATIVE
MONOCYTES # BLD: 0.8 K/UL (ref 0.1–1.3)
MONOCYTES NFR BLD: 12 % (ref 4–12)
NEUTS SEG # BLD: 3.2 K/UL (ref 1.7–8.2)
NEUTS SEG NFR BLD: 50 % (ref 43–78)
NRBC # BLD: 0 K/UL (ref 0–0.2)
OPIATES UR QL: NEGATIVE
PCP UR QL: NEGATIVE
PLATELET # BLD AUTO: 253 K/UL (ref 150–450)
PMV BLD AUTO: 10 FL (ref 9.4–12.3)
POTASSIUM SERPL-SCNC: 5 MMOL/L (ref 3.5–5.1)
PROT SERPL-MCNC: 5.5 G/DL (ref 6.3–8.2)
RBC # BLD AUTO: 3.26 M/UL (ref 4.05–5.2)
SERVICE CMNT-IMP: NORMAL
SODIUM SERPL-SCNC: 140 MMOL/L (ref 133–143)
WBC # BLD AUTO: 6.3 K/UL (ref 4.3–11.1)

## 2023-02-20 PROCEDURE — 82533 TOTAL CORTISOL: CPT

## 2023-02-20 PROCEDURE — 36415 COLL VENOUS BLD VENIPUNCTURE: CPT

## 2023-02-20 PROCEDURE — 94760 N-INVAS EAR/PLS OXIMETRY 1: CPT

## 2023-02-20 PROCEDURE — 85025 COMPLETE CBC W/AUTO DIFF WBC: CPT

## 2023-02-20 PROCEDURE — 87641 MR-STAPH DNA AMP PROBE: CPT

## 2023-02-20 PROCEDURE — 87502 INFLUENZA DNA AMP PROBE: CPT

## 2023-02-20 PROCEDURE — 2580000003 HC RX 258: Performed by: INTERNAL MEDICINE

## 2023-02-20 PROCEDURE — 6360000002 HC RX W HCPCS: Performed by: INTERNAL MEDICINE

## 2023-02-20 PROCEDURE — 2500000003 HC RX 250 WO HCPCS: Performed by: HOSPITALIST

## 2023-02-20 PROCEDURE — 1100000000 HC RM PRIVATE

## 2023-02-20 PROCEDURE — 80307 DRUG TEST PRSMV CHEM ANLYZR: CPT

## 2023-02-20 PROCEDURE — 80053 COMPREHEN METABOLIC PANEL: CPT

## 2023-02-20 PROCEDURE — 94640 AIRWAY INHALATION TREATMENT: CPT

## 2023-02-20 PROCEDURE — 6370000000 HC RX 637 (ALT 250 FOR IP): Performed by: HOSPITALIST

## 2023-02-20 PROCEDURE — 97530 THERAPEUTIC ACTIVITIES: CPT

## 2023-02-20 PROCEDURE — 6370000000 HC RX 637 (ALT 250 FOR IP): Performed by: INTERNAL MEDICINE

## 2023-02-20 RX ADMIN — SODIUM CHLORIDE, PRESERVATIVE FREE 10 ML: 5 INJECTION INTRAVENOUS at 08:30

## 2023-02-20 RX ADMIN — Medication 2 TABLET: at 21:59

## 2023-02-20 RX ADMIN — VALPROIC ACID 1000 MG: 250 SOLUTION ORAL at 22:03

## 2023-02-20 RX ADMIN — CHLORPROMAZINE HYDROCHLORIDE 100 MG: 50 TABLET, FILM COATED ORAL at 22:00

## 2023-02-20 RX ADMIN — ESCITALOPRAM OXALATE 20 MG: 10 TABLET, FILM COATED ORAL at 08:30

## 2023-02-20 RX ADMIN — BUDESONIDE 500 MCG: 0.5 INHALANT RESPIRATORY (INHALATION) at 20:34

## 2023-02-20 RX ADMIN — Medication 2 TABLET: at 08:30

## 2023-02-20 RX ADMIN — Medication 2 TABLET: at 13:58

## 2023-02-20 RX ADMIN — SODIUM CHLORIDE, PRESERVATIVE FREE 10 ML: 5 INJECTION INTRAVENOUS at 22:01

## 2023-02-20 RX ADMIN — CLINDAMYCIN PHOSPHATE 600 MG: 600 INJECTION, SOLUTION INTRAVENOUS at 04:10

## 2023-02-20 RX ADMIN — HEPARIN SODIUM 5000 UNITS: 5000 INJECTION INTRAVENOUS; SUBCUTANEOUS at 21:59

## 2023-02-20 RX ADMIN — DOXEPIN HYDROCHLORIDE 100 MG: 50 CAPSULE ORAL at 21:59

## 2023-02-20 RX ADMIN — HEPARIN SODIUM 5000 UNITS: 5000 INJECTION INTRAVENOUS; SUBCUTANEOUS at 06:21

## 2023-02-20 RX ADMIN — HEPARIN SODIUM 5000 UNITS: 5000 INJECTION INTRAVENOUS; SUBCUTANEOUS at 13:58

## 2023-02-20 RX ADMIN — METOPROLOL TARTRATE 25 MG: 25 TABLET, FILM COATED ORAL at 22:00

## 2023-02-20 RX ADMIN — CHLORPROMAZINE HYDROCHLORIDE 100 MG: 50 TABLET, FILM COATED ORAL at 08:30

## 2023-02-20 RX ADMIN — BUDESONIDE 500 MCG: 0.5 INHALANT RESPIRATORY (INHALATION) at 07:32

## 2023-02-20 RX ADMIN — ANTI-FUNGAL POWDER MICONAZOLE NITRATE TALC FREE: 1.42 POWDER TOPICAL at 22:01

## 2023-02-20 RX ADMIN — OLANZAPINE 20 MG: 5 TABLET, FILM COATED ORAL at 22:00

## 2023-02-20 RX ADMIN — METOPROLOL TARTRATE 25 MG: 25 TABLET, FILM COATED ORAL at 08:30

## 2023-02-20 RX ADMIN — CLINDAMYCIN PHOSPHATE 600 MG: 600 INJECTION, SOLUTION INTRAVENOUS at 11:43

## 2023-02-20 RX ADMIN — ANTI-FUNGAL POWDER MICONAZOLE NITRATE TALC FREE: 1.42 POWDER TOPICAL at 08:31

## 2023-02-20 RX ADMIN — CLINDAMYCIN PHOSPHATE 600 MG: 600 INJECTION, SOLUTION INTRAVENOUS at 20:29

## 2023-02-20 RX ADMIN — DULOXETINE HYDROCHLORIDE 60 MG: 60 CAPSULE, DELAYED RELEASE ORAL at 08:31

## 2023-02-20 ASSESSMENT — PAIN SCALES - GENERAL: PAINLEVEL_OUTOF10: 0

## 2023-02-20 NOTE — PROGRESS NOTES
ACUTE PHYSICAL THERAPY GOALS:   (Developed with and agreed upon by patient and/or caregiver.)  (1.) Main Bocanegra  will move from supine to sit and sit to supine , scoot up and down, and roll side to side with CONTACT GUARD ASSIST within 7 treatment day(s). (2.) Main Bocanegra will transfer from sitting to standing with MODERATE ASSIST using the least restrictive device within 7 treatment day(s). (2.) Main Bocanegra will transfer from bed to chair and chair to bed with MODERATE ASSIST using the least restrictive device within 7 treatment day(s). (3.) Main Bocanegra will ambulate with MINIMAL ASSIST for 10 feet with the least restrictive device within 7 treatment day(s). (4.) Main Bocanegra will perform standing static balance activities x 3 minutes and seated dynamic balance activities x 15 minutes  with MINIMAL ASSIST to improve safety within 7 treatment day(s). (6.) Mian Bocanegra will perform bilateral lower extremity exercises x 10 min for HEP with STAND BY ASSIST to improve strength, endurance, and functional mobility within 7 treatment day(s). PHYSICAL THERAPY: Daily Note PM   (Link to Caseload Tracking: PT Visit Days : 2  Time In/Out PT Charge Capture  Rehab Caseload Tracker  Orders    Main Bocanegra is a 45 y.o. female   PRIMARY DIAGNOSIS: Hyponatremia  Acute hyponatremia [E87.1]  Hyponatremia [E87.1]  Acute kidney injury ([facility] [address] [N17.9]       Inpatient: Payor: [address] [place], [place] [address] / Plan: Emily Aguilera COMPLETE / Product Type: *No Product type* /     ASSESSMENT:     REHAB RECOMMENDATIONS:   Recommendation to date pending progress:  Setting:  Short-term Rehab    Equipment:    To Be Determined     ASSESSMENT:  Ms. Buddy Schreiber is supine in bed upon arrival and agreeable to therapy.  She transfers from supine to sitting with maxA to assist with managing BLE, but patient able to lift upper body well with little assist. She then scoots forward to reposition herself on edge with both feet on the floor. Performed various static and dynamic sitting tasks from unsupported sitting on EOB. However attempts at lateral scooting all unsuccessful (patient tends to walk to extend backwards making tasks unsafe as well.) Encouraged patient to attempt standing trial, however she becomes anxious and is unable to attempt today despite cueing and encouragement. She is repositioned back in supine with maxA. Patient continues to require frequent redirection and cueing throughout session due to cognitive deficits. Will continue to follow.        SUBJECTIVE:   Ms. Buddy Schreiber states, \"I like Johny Bonner like Perry Elyssa\"     Social/Functional Lives With: Parent  Type of Home: House  Home Layout: One level  Home Access: Ramped entrance  Bathroom Shower/Tub: Walk-in shower  Home Equipment: Ortega Alstrom, standard  Has the patient had two or more falls in the past year or any fall with injury in the past year?: Yes  Receives Help From: Family  ADL Assistance: Needs assistance  Homemaking Assistance: Needs assistance  Homemaking Responsibilities: Yes  Ambulation Assistance: Needs assistance  Transfer Assistance: Independent  Active : No  OBJECTIVE:     PAIN: Donnell Patience / O2: Jeremiah Delcid / Manuela Alfonso / Yazmin Roles:   Pre Treatment: 0/10         Post Treatment: 0/10 Vitals        Oxygen    Purewick    RESTRICTIONS/PRECAUTIONS:  Restrictions/Precautions  Restrictions/Precautions: Fall Risk  Restrictions/Precautions: Fall Risk     MOBILITY: I Mod I S SBA CGA Min Mod Max Total  NT x2 Comments:   Bed Mobility    Rolling [] [] [] [] [] [] [] [x] [] [] []    Supine to Sit [] [] [] [] [] [] [] [x] [] [] []    Scooting [] [] [] [] [] [] [] [x] [] [] []    Sit to Supine [] [] [] [] [] [] [] [x] [] [] []    Transfers    Sit to Stand [] [] [] [] [] [] [] [] [] [] []    Bed to Chair [] [] [] [] [] [] [] [] [] [] []    Stand to Sit [] [] [] [] [] [] [] [] [] [] []     [] [] [] [] [] [] [] [] [] [] []    I=Independent, Mod I=Modified Independent, S=Supervision, SBA=Standby Assistance, CGA=Contact Guard Assistance,   Min=Minimal Assistance, Mod=Moderate Assistance, Max=Maximal Assistance, Total=Total Assistance, NT=Not Tested    BALANCE: Good Fair+ Fair Fair- Poor NT Comments   Sitting Static [] [x] [] [] [] []    Sitting Dynamic [] [x] [] [] [] []              Standing Static [] [] [] [] [] []    Standing Dynamic [] [] [] [] [] []      GAIT: I Mod I S SBA CGA Min Mod Max Total  NT x2 Comments:   Level of Assistance [] [] [] [] [] [] [] [] [] [] []    Distance   feet    DME N/A    Gait Quality N/A    Weightbearing Status      Stairs      I=Independent, Mod I=Modified Independent, S=Supervision, SBA=Standby Assistance, CGA=Contact Guard Assistance,   Min=Minimal Assistance, Mod=Moderate Assistance, Max=Maximal Assistance, Total=Total Assistance, NT=Not Tested    PLAN:   FREQUENCY AND DURATION: 3 times/week for duration of hospital stay or until stated goals are met, whichever comes first.    TREATMENT:   TREATMENT:   Therapeutic Activity (44 Minutes): Therapeutic activity included Rolling, Supine to Sit, Sit to Supine, Scooting, and Sitting balance  to improve functional Activity tolerance, Balance, Coordination, Mobility, and Strength.     TREATMENT GRID:  N/A    AFTER TREATMENT PRECAUTIONS: Bed, Bed/Chair Locked, Call light within reach, Needs within reach, RN notified, and Visitors at bedside    INTERDISCIPLINARY COLLABORATION:  RN/ PCT and PT/ PTA    EDUCATION:      TIME IN/OUT:  Time In: 1418  Time Out: [address]  Minutes: [address] [facility], [address] [facility]

## 2023-02-20 NOTE — PROGRESS NOTES
Hospitalist Progress Note   Admit Date:  2023 12:22 PM   Name:  Candido Fuentes   Age:  45 y.o. Sex:  female  :  1970   MRN:  [id number]   Room:  [facility]    Presenting Complaint: Failure To Thrive     Reason(s) for Admission: Acute hyponatremia [E87.1]  Hyponatremia [E87.1]  Acute kidney injury [facility]) [N17.9]       Assessment & Plan:     Hyponatremia  - Resolved  - Likely due to hypovolemia  - Stop IVFs    Cough/sputum production  - Recent stay for PNA  - Was influenza positive on 2023  - CXR slightly improved at the time of admission from prior  - checked covid/flu  - checked procalcitonin neg  - defered further antbx for now but started for cellulitis  - CXR sl enlargement  - Recent echo normal systolic and diastolic fxn with BNP > 142  - HIV not reactive and nasal MRSA still not completed  - Drug screen unremarkble    Troponin mildly elevated  - See above recent echo no WMA and no card symptoms  - Will check 12 lead  - Likely due to EDU and not clearing    Hypotension  - responded well to IVF  - I agree that she was intravascularly depleted  - hold lisinopril, paramaters for metoprolol  - Echocardiogram is normal  - Check AM Cortisol    EDU  - Resolved  - renal ultrasound wnl  - Creatinine 2.8-->1.8-->1.1 --> 0.9  - IVF hold  - holding lisinopril   - Monitor I/O    Anemia  - Slight decrease during her admission  - No overt evidence of GIB  - Cover with GI prophylaxis  - Monitor    Schizophrenia  Unable to ambulate  - Managed by Dr. Jonathan Price at [facility], diagnosed at age 13  - Continue Cymbalta, xanax, zyprexa, doxepin, depakene, lexapro and thorazine  - Ambulation difficulty may be due to psychiatric issues or cellulitis    BMI 47  Tobacco Use disorder? - Needs lifestyle modicifation.     Cellulitis   Severe edema (likely lymphedema) of lower limbs  - Bilateral, but it looked like true cellulitis  -  Continue Clindamycin  - Allergies to usual antibiotics started clindamycin  - Continue probiotic    Failure to thrive  - PT/OT, PPD     PT/OT evals and PPD needed/ordered? Yes    Diet: ADULT DIET; Regular  VTE prophylaxis: Lovenox  Code status: Full Code    Hospital Problems:  Principal Problem:    Hyponatremia  Active Problems:    Schizophrenia ([facility] [address]    Morbid obesity ([facility] [address]  Resolved Problems:    * No resolved hospital problems. *     Subjective / History:   From HPI: Yvette Reynoso is a 45 y.o. female with medical history of obesity, schizophrenia who presented with concern for \"failure to thrive\". Family feels that they are no longer able to care for the patient. She has been unable to stand to walk d/t bilateral LE weakness and fatigue. She has had decreased oral intake. No fever or chills but continued cough and sputum productions. Patient recently admitted 1/26 - 2/6 for CAP versus viral pneumonia. Patient completed 7-day course of cefepime. Her mother feels she was discharged too early. Has not been able to walk apparently since discharge after admission for pneumonia. I did explain that there is a difference between clinical resolution and radiologic resolution. 24 Hour Events (2/20/2023)  Patient is asking about smoking. Says she's waiting on her mother to arriving. Asking why she can't walk. Denies CP/palpitations. Denies SOB/cough. Denies N/V/D. Denies abdominal pain. Denies F/C.     Allergies   Allergen Reactions    Penicillins Hives   Possibly Ceph    Prior to Admit Medications:  Current Outpatient Medications   Medication Instructions    albuterol (PROVENTIL) 2.5 mg, Nebulization, EVERY 6 HOURS PRN    ALPRAZolam (XANAX) 1 mg, Oral, 4 TIMES DAILY PRN    budesonide (PULMICORT) 500 mcg, Nebulization, 2 TIMES DAILY    chlorproMAZINE (THORAZINE) 50 MG tablet 100 mg orally in AM, and 100 mg orally in PM    doxepin (SINEQUAN) 100 mg, Oral, NIGHTLY    DULoxetine (CYMBALTA) 60 mg, Oral, DAILY    escitalopram (LEXAPRO) 20 mg, Oral, DAILY    lisinopril (PRINIVIL;ZESTRIL) 20 mg, Oral, DAILY    metoprolol tartrate (LOPRESSOR) 25 mg, Oral, 2 TIMES DAILY    OLANZapine (ZYPREXA) 20 mg, Oral, NIGHTLY    valproic acid (DEPAKENE) 1,000 mg, Oral, EVERY BEDTIME         Objective:   Patient Vitals for the past 24 hrs:   Temp Pulse Resp BP SpO2   02/20/23 0708 99 °F (37.2 °C) 98 18 105/79 (!) 89 %   02/20/23 0403 97.5 °F (36.4 °C) 100 18 (!) 118/90 91 %   02/19/23 2340 98.4 °F (36.9 °C) (!) 107 20 122/88 97 %   02/19/23 2028 -- 96 18 -- 95 %   02/19/23 1952 99.1 °F (37.3 °C) (!) 102 20 94/62 96 %   02/19/23 1604 98.2 °F (36.8 °C) 94 20 112/67 97 %   02/19/23 1230 98.5 °F (36.9 °C) 94 20 (!) 90/59 99 %       Oxygen Therapy  SpO2: (!) 89 %  Pulse via Oximetry: 97 beats per minute  Pulse Oximeter Device Mode: Intermittent  O2 Device: None (Room air)    Estimated body mass index is 49.28 kg/m² as calculated from the following:    Height as of this encounter: 5' 6\" (1.676 m). Weight as of this encounter: 305 lb 5.4 oz (138.5 kg). Intake/Output Summary (Last 24 hours) at 2/20/2023 1039  Last data filed at 2/20/2023 0830  Gross per 24 hour   Intake 110 ml   Output 1750 ml   Net -1640 ml         Physical Exam:    Blood pressure 105/79, pulse 98, temperature 99 °F (37.2 °C), temperature source Oral, resp. rate 18, height 5' 6\" (1.676 m), weight (!) 305 lb 5.4 oz (138.5 kg), SpO2 (!) 89 %. General:    Obese 64-year-old lady in no distress but anxious  Head:  Normocephalic, atraumatic  Eyes:  Sclerae appear normal.  Pupils are round and reactive  ENT:  Nares appear normal.  Moist oral mucosa  Neck:  No restricted ROM. CV:   RRR. No m/r/g. No jugular venous distension. Lungs:   Some rhonchi bilaterally anteriorly and laterally with overall good air entry  Abdomen:   Soft, nontender, nondistended, morbidly obese.   Extremities: No cyanosis or clubbing. +2 spongy edema  Skin:     Cellulitis significantly improved lower limbs, quite severe brawny edema, trophic skin changes, some bullous formation, feet macerated prior soaking in urine. Right heel degradation. Skin is generally in poor condition. Neuro:  CN II-XII grossly intact.   Grossly intact and nonfocal  Psych:  Consistent with schizophrenia    I have personally reviewed labs and tests:  Recent Labs:  Recent Results (from the past 24 hour(s))   Sodium    Collection Time: 02/19/23 10:53 AM   Result Value Ref Range    Sodium 140 133 - 143 mmol/L   Sodium    Collection Time: 02/19/23  2:06 PM   Result Value Ref Range    Sodium 138 133 - 143 mmol/L   PLEASE READ & DOCUMENT PPD TEST IN 48 HRS    Collection Time: 02/19/23  6:00 PM   Result Value Ref Range    PPD, (POC) Negative Negative    mm Induration 0 0 - 5 mm   Cortisol AM, Total    Collection Time: 02/20/23  6:20 AM   Result Value Ref Range    Cortisol - AM 11.1 7 - 25 ug/dL   CBC with Auto Differential    Collection Time: 02/20/23  6:20 AM   Result Value Ref Range    WBC 6.3 4.3 - 11.1 K/uL    RBC 3.26 (L) 4.05 - 5.2 M/uL    Hemoglobin 9.7 (L) 11.7 - 15.4 g/dL    Hematocrit 29.7 (L) 35.8 - 46.3 %    MCV 91.1 82 - 102 FL    MCH 29.8 26.1 - 32.9 PG    MCHC 32.7 31.4 - 35.0 g/dL    RDW 15.5 (H) 11.9 - 14.6 %    Platelets 848 513 - 500 K/uL    MPV 10.0 9.4 - 12.3 FL    nRBC 0.00 0.0 - 0.2 K/uL    Differential Type AUTOMATED      Seg Neutrophils 50 43 - 78 %    Lymphocytes 31 13 - 44 %    Monocytes 12 4.0 - 12.0 %    Eosinophils % 3 0.5 - 7.8 %    Basophils 1 0.0 - 2.0 %    Immature Granulocytes 3 0.0 - 5.0 %    Segs Absolute 3.2 1.7 - 8.2 K/UL    Absolute Lymph # 2.0 0.5 - 4.6 K/UL    Absolute Mono # 0.8 0.1 - 1.3 K/UL    Absolute Eos # 0.2 0.0 - 0.8 K/UL    Basophils Absolute 0.1 0.0 - 0.2 K/UL    Absolute Immature Granulocyte 0.2 0.0 - 0.5 K/UL   Comprehensive Metabolic Panel    Collection Time: 02/20/23  6:20 AM   Result Value Ref Range    Sodium 140 133 - 143 mmol/L    Potassium 5.0 3.5 - 5.1 mmol/L    Chloride 109 101 - 110 mmol/L    CO2 28 21 - 32 mmol/L    Anion Gap 3 2 - 11 mmol/L    Glucose 95 65 - 100 mg/dL    BUN 21 6 - 23 MG/DL    Creatinine 0.90 0.6 - 1.0 MG/DL    Est, Glom Filt Rate >60 >60 ml/min/1.73m2    Calcium 8.7 8.3 - 10.4 MG/DL    Total Bilirubin 0.2 0.2 - 1.1 MG/DL    ALT 26 12 - 65 U/L    AST 47 (H) 15 - 37 U/L    Alk Phosphatase 59 50 - 136 U/L    Total Protein 5.5 (L) 6.3 - 8.2 g/dL    Albumin 2.3 (L) 3.5 - 5.0 g/dL    Globulin 3.2 2.8 - 4.5 g/dL    Albumin/Globulin Ratio 0.7 0.4 - 1.6     MSSA/MRSA Screen BY PCR    Collection Time: 02/20/23  8:38 AM    Specimen: Nares; Swab   Result Value Ref Range    Special Requests NO SPECIAL REQUESTS      Culture        SA target not detected. A MRSA NEGATIVE, SA NEGATIVE test result does not preclude MRSA or SA nasal colonization. Urine Drug Screen    Collection Time: 02/20/23  8:40 AM   Result Value Ref Range    PCP, Urine Negative NEG      Benzodiazepines, Urine Negative NEG      Cocaine, Urine Negative NEG      Amphetamine, Urine Negative NEG      Methadone, Urine Negative NEG      THC, TH-Cannabinol, Urine Negative NEG      Opiates, Urine Negative NEG      Barbiturates, Urine Negative NEG     Influenza A/B, Molecular    Collection Time: 02/20/23  8:40 AM    Specimen: Not Specified   Result Value Ref Range    Influenza A, BETTY Not detected NOTD      Influenza B, BETTY Not detected NOTD         I have personally reviewed imaging studies:  XR PELVIS (1-2 VIEWS)    Result Date: 2/17/2023  AP pelvis CLINICAL INDICATION: Right hip pain FINDINGS: Single AP view of the pelvis show the hip joint spaces to be well-maintained. No obvious fracture appreciated. The SI joints are intact. The pubic symphysis intact. Single AP view of the pelvis without obvious fracture or joint derangement. XR CHEST PORTABLE    Result Date: 2/17/2023  Portable chest: History: weakness Comparison: 02/03/2023 Findings: A single view of the chest was obtained at 12:39 PM. There is a relative shallow with a result, unchanged.  The cardiac silhouette is mildly enlarged. Bilateral airspace opacities are once again noted with mild improvement in left perihilar region. There are no pleural effusions. Bilateral airspace opacities with mild improvement on the left. Echocardiogram:  01/26/23    TRANSTHORACIC ECHOCARDIOGRAM (TTE) COMPLETE (CONTRAST/BUBBLE/3D PRN) 02/03/2023 10:43 AM, 02/03/2023 12:00 AM (Final)    Interpretation Summary    Left Ventricle: Normal left ventricular systolic function. EF by 2D Simpsons Biplane is 65%. Left ventricle size is normal. Normal wall thickness. Normal wall motion. Normal diastolic function. Aorta: Ao root diameter is 2.7 cm. Ao Root Index is 1.14 cm/m2. Technical qualifiers: Procedure performed with the patient in a supine position, color flow Doppler was performed and pulse wave and/or continuous wave Doppler was performed. Contrast used: Definity.     Signed by: Yovana Siegel MD on 2/3/2023 10:43 AM, Signed by: Unknown Provider Result on 2/3/2023 12:00 AM        Orders Placed This Encounter   Medications    0.9 % sodium chloride bolus    albuterol (PROVENTIL) nebulizer solution 2.5 mg     Order Specific Question:   Initiate RT Bronchodilator Protocol     Answer:   Yes - Inpatient Protocol    ALPRAZolam (XANAX) tablet 1 mg    budesonide (PULMICORT) nebulizer suspension 500 mcg    chlorproMAZINE (THORAZINE) tablet 100 mg    doxepin (SINEQUAN) capsule 100 mg    DULoxetine (CYMBALTA) extended release capsule 60 mg    escitalopram (LEXAPRO) tablet 20 mg    lisinopril (PRINIVIL;ZESTRIL) tablet 20 mg    metoprolol tartrate (LOPRESSOR) tablet 25 mg    OLANZapine (ZYPREXA) tablet 20 mg    valproic acid (DEPAKENE) 250 MG/5ML oral solution 1,000 mg    0.9 % sodium chloride infusion    tuberculin injection 5 Units    sodium chloride flush 0.9 % injection 5-40 mL    sodium chloride flush 0.9 % injection 5-40 mL    0.9 % sodium chloride infusion    DISCONTD: enoxaparin (LOVENOX) injection 40 mg     Order Specific Question:   Indication of Use     Answer:   Prophylaxis-DVT/PE    OR Linked Order Group     ondansetron (ZOFRAN-ODT) disintegrating tablet 4 mg     ondansetron (ZOFRAN) injection 4 mg    polyethylene glycol (GLYCOLAX) packet 17 g    OR Linked Order Group     acetaminophen (TYLENOL) tablet 650 mg     acetaminophen (TYLENOL) suppository 650 mg    heparin (porcine) injection 5,000 Units    0.9 % sodium chloride bolus    DISCONTD: clindamycin (CLEOCIN) 600 mg in dextrose 5 % 50 mL IVPB     Order Specific Question:   Antimicrobial Indications     Answer:   Skin and Soft Tissue Infection     Order Specific Question:   Skin duration of therapy     Answer:   5 days    lactobacillus acidophilus (FLORANEX) 2 tablet    clindamycin (CLEOCIN) 600 mg in dextrose 5 % 50 mL IVPB     Order Specific Question:   Antimicrobial Indications     Answer:   Skin and Soft Tissue Infection     Order Specific Question:   Skin duration of therapy     Answer:   5 days    miconazole (MICOTIN) 2 % powder       Signed:  Roman Calle DO    Part of this note may have been written by using a voice dictation software. The note has been proof read but may still contain some grammatical/other typographical errors.

## 2023-02-20 NOTE — PROGRESS NOTES
Reviewed notes for new spiritual concerns      Will continue to assess how we can best serve this family        [place].       Per notes:       LOCAL    MOTHER -  JOHAN    FULL CODE    NO ACP    HIGH RISK FALLS    LOS  3  DAYS

## 2023-02-20 NOTE — PROGRESS NOTES
Good visit with pt    She was calm and alert    Very engaging with  as she talked about her family    No distress noted    Prayer

## 2023-02-20 NOTE — WOUND CARE
Patient seen for reported right heel blister and skin fold skin damage. Patient las 2 panus folds noted. There is a linear skin fissure on right side 2cm long from intertrigo damage and panus fold shearing. Recommend ABD's to help manage moisture. Change at least daily. Is on son bed with low air loss. Right heel has intact blister 1x2x0 cm with slight purple hue. Recommend offloading heel with use of pillows under lower leg or heel boot. May use piece of tegaderm over blister to protect if needed.  Discussed with patient, answered her questions. Will follow loosely, please call if needed.

## 2023-02-20 NOTE — PROGRESS NOTES
Patient awake, alert and oriented to person, place, time and situation. Vitals signs are stable. Hourly rounds completed during this shift. Bilateral legs edematous and elevated. Denies pain and discomfort at this time. Bed in low position and wheels locked, safety precautions maintained, call bell in reach and personal items are in reach. Will continue to monitor patient. Report to be given to night shift nurse.

## 2023-02-20 NOTE — CARE COORDINATION
CM following patient's chart. [facility] referral declined. Regine Lagunas still pending - CM contacted [facility] to check on status of referral. Left message to call CM back. XAVIER then called Rahel with [facility] (was instructed to call Nathalie Conteho, as Kristine Suárez is currently out of town). She didn't receive referral (referral was sent via [address]) and asked that it be hard faxed to [phone]. Referral faxed. She did state that she currently has a bed available. If patient accepts bed offer, auth will need to be started. Nathalie Rodas will be reaching back out to  after referral has been reviewed. 3:30 - CM attempted to call [facility] with Regine Lagunas, unable to reach him. CM will try again at a later time. XAVIER called [facility] to check on the status of the referral. She asked that CM email the referral because her fax won't allow her to get anything over 50 pages? CM emailed referral to Nathalie Rodas. [email or web address]. CM will check on status of referral tomorrow, 2/21.

## 2023-02-21 LAB — SODIUM SERPL-SCNC: 140 MMOL/L (ref 133–143)

## 2023-02-21 PROCEDURE — 6360000002 HC RX W HCPCS: Performed by: INTERNAL MEDICINE

## 2023-02-21 PROCEDURE — 2500000003 HC RX 250 WO HCPCS: Performed by: HOSPITALIST

## 2023-02-21 PROCEDURE — 94664 DEMO&/EVAL PT USE INHALER: CPT

## 2023-02-21 PROCEDURE — 6370000000 HC RX 637 (ALT 250 FOR IP): Performed by: INTERNAL MEDICINE

## 2023-02-21 PROCEDURE — 94640 AIRWAY INHALATION TREATMENT: CPT

## 2023-02-21 PROCEDURE — 2580000003 HC RX 258: Performed by: INTERNAL MEDICINE

## 2023-02-21 PROCEDURE — 94760 N-INVAS EAR/PLS OXIMETRY 1: CPT

## 2023-02-21 PROCEDURE — 84295 ASSAY OF SERUM SODIUM: CPT

## 2023-02-21 PROCEDURE — 1100000000 HC RM PRIVATE

## 2023-02-21 PROCEDURE — 6370000000 HC RX 637 (ALT 250 FOR IP): Performed by: HOSPITALIST

## 2023-02-21 RX ORDER — CLINDAMYCIN HYDROCHLORIDE 150 MG/1
300 CAPSULE ORAL EVERY 8 HOURS
Status: DISCONTINUED | OUTPATIENT
Start: 2023-02-21 | End: 2023-02-22 | Stop reason: HOSPADM

## 2023-02-21 RX ORDER — ENOXAPARIN SODIUM 100 MG/ML
30 INJECTION SUBCUTANEOUS EVERY 12 HOURS
Status: DISCONTINUED | OUTPATIENT
Start: 2023-02-21 | End: 2023-02-22 | Stop reason: HOSPADM

## 2023-02-21 RX ADMIN — CLINDAMYCIN HYDROCHLORIDE 300 MG: 150 CAPSULE ORAL at 12:00

## 2023-02-21 RX ADMIN — Medication 2 TABLET: at 21:48

## 2023-02-21 RX ADMIN — ESCITALOPRAM OXALATE 20 MG: 10 TABLET, FILM COATED ORAL at 09:53

## 2023-02-21 RX ADMIN — VALPROIC ACID 1000 MG: 250 SOLUTION ORAL at 21:47

## 2023-02-21 RX ADMIN — BUDESONIDE 500 MCG: 0.5 INHALANT RESPIRATORY (INHALATION) at 19:05

## 2023-02-21 RX ADMIN — METOPROLOL TARTRATE 25 MG: 25 TABLET, FILM COATED ORAL at 21:48

## 2023-02-21 RX ADMIN — ANTI-FUNGAL POWDER MICONAZOLE NITRATE TALC FREE: 1.42 POWDER TOPICAL at 09:55

## 2023-02-21 RX ADMIN — CLINDAMYCIN HYDROCHLORIDE 300 MG: 150 CAPSULE ORAL at 20:00

## 2023-02-21 RX ADMIN — BUDESONIDE 500 MCG: 0.5 INHALANT RESPIRATORY (INHALATION) at 08:06

## 2023-02-21 RX ADMIN — CHLORPROMAZINE HYDROCHLORIDE 100 MG: 50 TABLET, FILM COATED ORAL at 22:01

## 2023-02-21 RX ADMIN — CHLORPROMAZINE HYDROCHLORIDE 100 MG: 50 TABLET, FILM COATED ORAL at 09:50

## 2023-02-21 RX ADMIN — SODIUM CHLORIDE, PRESERVATIVE FREE 10 ML: 5 INJECTION INTRAVENOUS at 09:49

## 2023-02-21 RX ADMIN — Medication 2 TABLET: at 09:49

## 2023-02-21 RX ADMIN — CLINDAMYCIN PHOSPHATE 600 MG: 600 INJECTION, SOLUTION INTRAVENOUS at 04:25

## 2023-02-21 RX ADMIN — OLANZAPINE 20 MG: 5 TABLET, FILM COATED ORAL at 21:47

## 2023-02-21 RX ADMIN — DOXEPIN HYDROCHLORIDE 100 MG: 50 CAPSULE ORAL at 22:06

## 2023-02-21 RX ADMIN — HEPARIN SODIUM 5000 UNITS: 5000 INJECTION INTRAVENOUS; SUBCUTANEOUS at 06:28

## 2023-02-21 RX ADMIN — SODIUM CHLORIDE, PRESERVATIVE FREE 10 ML: 5 INJECTION INTRAVENOUS at 22:08

## 2023-02-21 RX ADMIN — ANTI-FUNGAL POWDER MICONAZOLE NITRATE TALC FREE: 1.42 POWDER TOPICAL at 21:00

## 2023-02-21 RX ADMIN — ENOXAPARIN SODIUM 30 MG: 100 INJECTION SUBCUTANEOUS at 21:49

## 2023-02-21 RX ADMIN — DULOXETINE HYDROCHLORIDE 60 MG: 60 CAPSULE, DELAYED RELEASE ORAL at 09:49

## 2023-02-21 RX ADMIN — Medication 2 TABLET: at 14:37

## 2023-02-21 RX ADMIN — METOPROLOL TARTRATE 25 MG: 25 TABLET, FILM COATED ORAL at 09:50

## 2023-02-21 NOTE — PROGRESS NOTES
Pt resting in bed. Hourly rounds completed. Bed low and locked. Call light and personal items within pt's reach. Pt denies needs at this time.  Will continue to monitor and give report to night shift RN

## 2023-02-21 NOTE — PROGRESS NOTES
Hospitalist Progress Note   Admit Date:  2023 12:22 PM   Name:  Eduardo Olson   Age:  45 y.o. Sex:  female  :  1970   MRN:  [id number]   Room:  [facility]/    Presenting Complaint: Failure To Thrive     Reason(s) for Admission: Acute hyponatremia [E87.1]  Hyponatremia [E87.1]  Acute kidney injury ([address] [N17.9]       Assessment & Plan:     Hyponatremia  - Resolved    Cough/sputum production  - Resolved    Troponin mildly elevated  - Resolved    Hypotension  - Resolved    EDU  - Resolved    Anemia  - Slight decrease during her admission  - No overt evidence of GIB  - Cover with GI prophylaxis  - Monitor    Schizophrenia  Unable to ambulate  - Managed by Dr. Kelton Prieto at [facility], diagnosed at age 13  - Continue Cymbalta, xanax, zyprexa, doxepin, depakene, lexapro and thorazine  - Ambulation difficulty may be due to psychiatric issues or cellulitis    BMI 47  Tobacco Use disorder? - Needs lifestyle modicifation. Cellulitis   Severe edema (likely lymphedema) of lower limbs  - Bilateral, but it looked like true cellulitis  -  Continue Clindamycin  - Allergies to usual antibiotics started clindamycin  - Continue probiotic    Failure to thrive  - PT/OT, PPD     PT/OT evals and PPD needed/ordered? Yes    Diet: ADULT DIET; Regular  VTE prophylaxis: Lovenox  Code status: Full Code    Hospital Problems:  Principal Problem:    Hyponatremia  Active Problems:    Schizophrenia ([address]    Morbid obesity ([address]  Resolved Problems:    * No resolved hospital problems. *     Subjective / History:   From HPI: Eduardo Olson is a 45 y.o. female with medical history of obesity, schizophrenia who presented with concern for \"failure to thrive\". Family feels that they are no longer able to care for the patient. She has been unable to stand to walk d/t bilateral LE weakness and fatigue. She has had decreased oral intake. No fever or chills but continued cough and sputum productions.    Patient recently admitted  - 2/6 for CAP versus viral pneumonia. Patient completed 7-day course of cefepime. Her mother feels she was discharged too early. Has not been able to walk apparently since discharge after admission for pneumonia. I did explain that there is a difference between clinical resolution and radiologic resolution. 24 Hour Events (2/21/2023)  Patient telling me phrases in Burmese. Told me it's bad to smoke. Denies CP/palpitations. Denies SOB/cough. Denies N/V/D. Denies abdominal pain. Denies F/C. Allergies   Allergen Reactions    Penicillins Hives   Possibly Ceph    Prior to Admit Medications:  Current Outpatient Medications   Medication Instructions    albuterol (PROVENTIL) 2.5 mg, Nebulization, EVERY 6 HOURS PRN    ALPRAZolam (XANAX) 1 mg, Oral, 4 TIMES DAILY PRN    budesonide (PULMICORT) 500 mcg, Nebulization, 2 TIMES DAILY    chlorproMAZINE (THORAZINE) 50 MG tablet 100 mg orally in AM, and 100 mg orally in PM    doxepin (SINEQUAN) 100 mg, Oral, NIGHTLY    DULoxetine (CYMBALTA) 60 mg, Oral, DAILY    escitalopram (LEXAPRO) 20 mg, Oral, DAILY    lisinopril (PRINIVIL;ZESTRIL) 20 mg, Oral, DAILY    metoprolol tartrate (LOPRESSOR) 25 mg, Oral, 2 TIMES DAILY    OLANZapine (ZYPREXA) 20 mg, Oral, NIGHTLY    valproic acid (DEPAKENE) 1,000 mg, Oral, EVERY BEDTIME         Objective:   Patient Vitals for the past 24 hrs:   Temp Pulse Resp BP SpO2   02/21/23 1133 98 °F (36.7 °C) 98 20 (!) 106/49 95 %   02/21/23 0806 -- 85 18 -- 92 %   02/21/23 0748 97.7 °F (36.5 °C) 93 18 115/74 93 %   02/21/23 0351 98.1 °F (36.7 °C) 87 22 121/75 (!) 89 %   02/20/23 2303 97.7 °F (36.5 °C) 94 20 126/77 94 %   02/20/23 2034 -- 90 16 -- 93 %   02/20/23 2015 97.5 °F (36.4 °C) 95 16 118/74 95 %   02/20/23 1641 97.9 °F (36.6 °C) 91 14 127/84 94 %       Oxygen Therapy  SpO2: 95 %  Pulse Oximetry Type:  Intermittent  Pulse via Oximetry: 97 beats per minute  Pulse Oximeter Device Mode: Intermittent  O2 Device: None (Room air)    Estimated body mass index is 49.28 kg/m² as calculated from the following:    Height as of this encounter: 5' 6\" (1.676 m). Weight as of this encounter: 305 lb 5.4 oz (138.5 kg). Intake/Output Summary (Last 24 hours) at 2/21/2023 1346  Last data filed at 2/21/2023 1133  Gross per 24 hour   Intake 100 ml   Output 4275 ml   Net -4175 ml         Physical Exam:    Blood pressure (!) 106/49, pulse 98, temperature 98 °F (36.7 °C), temperature source Oral, resp. rate 20, height 5' 6\" (1.676 m), weight (!) 305 lb 5.4 oz (138.5 kg), SpO2 95 %. General:    Obese 70-year-old lady in no distress but anxious  Head:  Normocephalic, atraumatic  Eyes:  Sclerae appear normal.  Pupils are round and reactive  ENT:  Nares appear normal.  Moist oral mucosa  Neck:  No restricted ROM. CV:   RRR. No m/r/g. No jugular venous distension. Lungs:   Some rhonchi bilaterally anteriorly and laterally with overall good air entry  Abdomen:   Soft, nontender, nondistended, morbidly obese. Extremities: No cyanosis or clubbing. +2 spongy edema  Skin:     Cellulitis significantly improved lower limbs, quite severe brawny edema, trophic skin changes, some bullous formation, feet macerated prior soaking in urine. Right heel degradation. Skin is generally in poor condition. Neuro:  CN II-XII grossly intact. Grossly intact and nonfocal  Psych:  Consistent with schizophrenia    I have personally reviewed labs and tests:  Recent Labs:  Recent Results (from the past 24 hour(s))   Sodium    Collection Time: 02/21/23  6:37 AM   Result Value Ref Range    Sodium 140 133 - 143 mmol/L       I have personally reviewed imaging studies:  XR PELVIS (1-2 VIEWS)    Result Date: 2/17/2023  AP pelvis CLINICAL INDICATION: Right hip pain FINDINGS: Single AP view of the pelvis show the hip joint spaces to be well-maintained. No obvious fracture appreciated. The SI joints are intact. The pubic symphysis intact.      Single AP view of the pelvis without obvious fracture or joint derangement. XR CHEST PORTABLE    Result Date: 2/17/2023  Portable chest: History: weakness Comparison: 02/03/2023 Findings: A single view of the chest was obtained at 12:39 PM. There is a relative shallow with a result, unchanged. The cardiac silhouette is mildly enlarged. Bilateral airspace opacities are once again noted with mild improvement in left perihilar region. There are no pleural effusions. Bilateral airspace opacities with mild improvement on the left. Echocardiogram:  01/26/23    TRANSTHORACIC ECHOCARDIOGRAM (TTE) COMPLETE (CONTRAST/BUBBLE/3D PRN) 02/03/2023 10:43 AM, 02/03/2023 12:00 AM (Final)    Interpretation Summary    Left Ventricle: Normal left ventricular systolic function. EF by 2D Simpsons Biplane is 65%. Left ventricle size is normal. Normal wall thickness. Normal wall motion. Normal diastolic function. Aorta: Ao root diameter is 2.7 cm. Ao Root Index is 1.14 cm/m2. Technical qualifiers: Procedure performed with the patient in a supine position, color flow Doppler was performed and pulse wave and/or continuous wave Doppler was performed. Contrast used: Definity.     Signed by: Charmayne Rhein, MD on 2/3/2023 10:43 AM, Signed by: Unknown Provider Result on 2/3/2023 12:00 AM        Orders Placed This Encounter   Medications    0.9 % sodium chloride bolus    albuterol (PROVENTIL) nebulizer solution 2.5 mg     Order Specific Question:   Initiate RT Bronchodilator Protocol     Answer:   Yes - Inpatient Protocol    ALPRAZolam (XANAX) tablet 1 mg    budesonide (PULMICORT) nebulizer suspension 500 mcg    chlorproMAZINE (THORAZINE) tablet 100 mg    doxepin (SINEQUAN) capsule 100 mg    DULoxetine (CYMBALTA) extended release capsule 60 mg    escitalopram (LEXAPRO) tablet 20 mg    lisinopril (PRINIVIL;ZESTRIL) tablet 20 mg    metoprolol tartrate (LOPRESSOR) tablet 25 mg    OLANZapine (ZYPREXA) tablet 20 mg    valproic acid (DEPAKENE) 250 MG/5ML oral solution 1,000 mg    DISCONTD: 0.9 % sodium chloride infusion    tuberculin injection 5 Units    sodium chloride flush 0.9 % injection 5-40 mL    sodium chloride flush 0.9 % injection 5-40 mL    0.9 % sodium chloride infusion    DISCONTD: enoxaparin (LOVENOX) injection 40 mg     Order Specific Question:   Indication of Use     Answer:   Prophylaxis-DVT/PE    OR Linked Order Group     ondansetron (ZOFRAN-ODT) disintegrating tablet 4 mg     ondansetron (ZOFRAN) injection 4 mg    polyethylene glycol (GLYCOLAX) packet 17 g    OR Linked Order Group     acetaminophen (TYLENOL) tablet 650 mg     acetaminophen (TYLENOL) suppository 650 mg    DISCONTD: heparin (porcine) injection 5,000 Units    0.9 % sodium chloride bolus    DISCONTD: clindamycin (CLEOCIN) 600 mg in dextrose 5 % 50 mL IVPB     Order Specific Question:   Antimicrobial Indications     Answer:   Skin and Soft Tissue Infection     Order Specific Question:   Skin duration of therapy     Answer:   5 days    lactobacillus acidophilus (FLORANEX) 2 tablet    DISCONTD: clindamycin (CLEOCIN) 600 mg in dextrose 5 % 50 mL IVPB     Order Specific Question:   Antimicrobial Indications     Answer:   Skin and Soft Tissue Infection     Order Specific Question:   Skin duration of therapy     Answer:   5 days    miconazole (MICOTIN) 2 % powder    enoxaparin Sodium (LOVENOX) injection 30 mg     Order Specific Question:   Indication of Use     Answer:   Prophylaxis-DVT/PE    clindamycin (CLEOCIN) capsule 300 mg     Order Specific Question:   Antimicrobial Indications     Answer:   Skin and Soft Tissue Infection     Order Specific Question:   Skin duration of therapy     Answer:   5 days       Signed:  Carmita Jones DO    Part of this note may have been written by using a voice dictation software. The note has been proof read but may still contain some grammatical/other typographical errors.

## 2023-02-21 NOTE — CARE COORDINATION
Mercedes with [facility] advised that if patient does not have to have a air mattress, she would accept patient (which was confirmed by nursing staff that she does not need one). CM met with patient with mother and aunt at bedside. They accepted bed from [address] [place] [address]. She will be starting Ashli Plana today.

## 2023-02-21 NOTE — PLAN OF CARE
Problem: Discharge Planning  Goal: Discharge to home or other facility with appropriate resources  Outcome: Progressing     Problem: Skin/Tissue Integrity  Goal: Absence of new skin breakdown  Description: 1. Monitor for areas of redness and/or skin breakdown  2. Assess vascular access sites hourly  3. Every 4-6 hours minimum:  Change oxygen saturation probe site  4. Every 4-6 hours:  If on nasal continuous positive airway pressure, respiratory therapy assess nares and determine need for appliance change or resting period. Outcome: Progressing     Problem: Safety - Adult  Goal: Free from fall injury  Outcome: Progressing  Flowsheets (Taken 2/21/2023 0730)  Free From Fall Injury: Instruct family/caregiver on patient safety     Problem: Respiratory - Adult  Goal: Achieves optimal ventilation and oxygenation  2/21/2023 1806 by Peg Snow RN  Outcome: Progressing  2/21/2023 0807 by Denise Burns RCP  Outcome: Progressing     Problem: Skin/Tissue Integrity - Adult  Goal: Skin integrity remains intact  Outcome: Progressing  Flowsheets (Taken 2/21/2023 0730)  Skin Integrity Remains Intact:   Monitor for areas of redness and/or skin breakdown   Assess vascular access sites hourly  Goal: Incisions, wounds, or drain sites healing without S/S of infection  Outcome: Progressing  Flowsheets (Taken 2/21/2023 0730)  Incisions, Wounds, or Drain Sites Healing Without Sign and Symptoms of Infection: Implement wound care per orders     Problem: Musculoskeletal - Adult  Goal: Return ADL status to a safe level of function  Outcome: Progressing     Problem: Anxiety  Goal: Will report anxiety at manageable levels  Description: INTERVENTIONS:  1. Administer medication as ordered  2. Teach and rehearse alternative coping skills  3.  Provide emotional support with 1:1 interaction with staff  Outcome: Progressing     Problem: Pain  Goal: Verbalizes/displays adequate comfort level or baseline comfort level  Outcome: Progressing

## 2023-02-21 NOTE — PLAN OF CARE
Problem: Respiratory - Adult  Goal: Achieves optimal ventilation and oxygenation  2/21/2023 1808 by Hilda Chen RN  Outcome: Progressing  2/21/2023 1807 by Hilda Chen RN  Outcome: Progressing  2/21/2023 1806 by Hilda Chen RN  Outcome: Progressing  2/21/2023 0807 by Mary Tabares RCP  Outcome: Progressing     Problem: Skin/Tissue Integrity - Adult  Goal: Skin integrity remains intact  2/21/2023 1808 by Hilda Chen RN  Outcome: Progressing  2/21/2023 1807 by Hilda Chen RN  Outcome: Progressing  2/21/2023 1806 by Hilda Chen RN  Outcome: Progressing  Flowsheets (Taken 2/21/2023 0730)  Skin Integrity Remains Intact:   Monitor for areas of redness and/or skin breakdown   Assess vascular access sites hourly  Goal: Incisions, wounds, or drain sites healing without S/S of infection  2/21/2023 1808 by Hilda Chen RN  Outcome: Progressing  2/21/2023 1807 by Hilda Chen RN  Outcome: Progressing  2/21/2023 1806 by Hilda Chen RN  Outcome: Progressing  Flowsheets (Taken 2/21/2023 0730)  Incisions, Wounds, or Drain Sites Healing Without Sign and Symptoms of Infection: Implement wound care per orders     Problem: Musculoskeletal - Adult  Goal: Return ADL status to a safe level of function  2/21/2023 1808 by Hilda Chen RN  Outcome: Progressing  2/21/2023 1807 by Hilda Chen RN  Outcome: Progressing  2/21/2023 1806 by Hilda Chen RN  Outcome: Progressing     Problem: Pain  Goal: Verbalizes/displays adequate comfort level or baseline comfort level  2/21/2023 1808 by Hilda Chen RN  Outcome: Progressing  2/21/2023 1807 by Hilda Chen RN  Outcome: Progressing  2/21/2023 1806 by Hilda Chen RN  Outcome: Progressing

## 2023-02-22 VITALS
RESPIRATION RATE: 16 BRPM | TEMPERATURE: 98.2 F | SYSTOLIC BLOOD PRESSURE: 115 MMHG | OXYGEN SATURATION: 90 % | HEART RATE: 93 BPM | HEIGHT: 66 IN | DIASTOLIC BLOOD PRESSURE: 52 MMHG | BODY MASS INDEX: 47.09 KG/M2 | WEIGHT: 293 LBS

## 2023-02-22 PROBLEM — L03.115 BILATERAL CELLULITIS OF LOWER LEG: Status: ACTIVE | Noted: 2023-02-22

## 2023-02-22 PROBLEM — E87.1 HYPONATREMIA: Status: RESOLVED | Noted: 2023-02-17 | Resolved: 2023-02-22

## 2023-02-22 PROBLEM — R65.20 SEVERE SEPSIS (HCC): Status: RESOLVED | Noted: 2023-01-26 | Resolved: 2023-02-22

## 2023-02-22 PROBLEM — A41.9 SEVERE SEPSIS (HCC): Status: RESOLVED | Noted: 2023-01-26 | Resolved: 2023-02-22

## 2023-02-22 PROBLEM — N17.9 AKI (ACUTE KIDNEY INJURY) (HCC): Status: RESOLVED | Noted: 2023-01-26 | Resolved: 2023-02-22

## 2023-02-22 PROBLEM — L03.116 BILATERAL CELLULITIS OF LOWER LEG: Status: ACTIVE | Noted: 2023-02-22

## 2023-02-22 LAB
SARS-COV-2 RDRP RESP QL NAA+PROBE: NOT DETECTED
SODIUM SERPL-SCNC: 141 MMOL/L (ref 133–143)
SOURCE: NORMAL

## 2023-02-22 PROCEDURE — 94640 AIRWAY INHALATION TREATMENT: CPT

## 2023-02-22 PROCEDURE — 97530 THERAPEUTIC ACTIVITIES: CPT

## 2023-02-22 PROCEDURE — 6370000000 HC RX 637 (ALT 250 FOR IP): Performed by: INTERNAL MEDICINE

## 2023-02-22 PROCEDURE — 36415 COLL VENOUS BLD VENIPUNCTURE: CPT

## 2023-02-22 PROCEDURE — 87635 SARS-COV-2 COVID-19 AMP PRB: CPT

## 2023-02-22 PROCEDURE — 97535 SELF CARE MNGMENT TRAINING: CPT

## 2023-02-22 PROCEDURE — 2580000003 HC RX 258: Performed by: INTERNAL MEDICINE

## 2023-02-22 PROCEDURE — 6370000000 HC RX 637 (ALT 250 FOR IP): Performed by: HOSPITALIST

## 2023-02-22 PROCEDURE — 84295 ASSAY OF SERUM SODIUM: CPT

## 2023-02-22 PROCEDURE — 6360000002 HC RX W HCPCS: Performed by: INTERNAL MEDICINE

## 2023-02-22 PROCEDURE — 97112 NEUROMUSCULAR REEDUCATION: CPT

## 2023-02-22 PROCEDURE — 94760 N-INVAS EAR/PLS OXIMETRY 1: CPT

## 2023-02-22 RX ORDER — ALPRAZOLAM 1 MG/1
1 TABLET ORAL 4 TIMES DAILY PRN
Qty: 12 TABLET | Refills: 0 | Status: SHIPPED | OUTPATIENT
Start: 2023-02-22 | End: 2023-02-25

## 2023-02-22 RX ORDER — L. ACIDOPHILUS/L.BULGARICUS 1MM CELL
2 TABLET ORAL 3 TIMES DAILY
Qty: 84 TABLET | Refills: 0
Start: 2023-02-22 | End: 2023-03-08

## 2023-02-22 RX ORDER — CLINDAMYCIN HYDROCHLORIDE 300 MG/1
300 CAPSULE ORAL EVERY 8 HOURS
Qty: 5 CAPSULE | Refills: 0
Start: 2023-02-22 | End: 2023-02-24

## 2023-02-22 RX ADMIN — ANTI-FUNGAL POWDER MICONAZOLE NITRATE TALC FREE: 1.42 POWDER TOPICAL at 08:07

## 2023-02-22 RX ADMIN — Medication 2 TABLET: at 08:06

## 2023-02-22 RX ADMIN — METOPROLOL TARTRATE 25 MG: 25 TABLET, FILM COATED ORAL at 08:07

## 2023-02-22 RX ADMIN — BUDESONIDE 500 MCG: 0.5 INHALANT RESPIRATORY (INHALATION) at 07:15

## 2023-02-22 RX ADMIN — Medication 2 TABLET: at 13:04

## 2023-02-22 RX ADMIN — CLINDAMYCIN HYDROCHLORIDE 300 MG: 150 CAPSULE ORAL at 13:04

## 2023-02-22 RX ADMIN — ENOXAPARIN SODIUM 30 MG: 100 INJECTION SUBCUTANEOUS at 08:07

## 2023-02-22 RX ADMIN — SODIUM CHLORIDE, PRESERVATIVE FREE 10 ML: 5 INJECTION INTRAVENOUS at 08:08

## 2023-02-22 RX ADMIN — ESCITALOPRAM OXALATE 20 MG: 10 TABLET, FILM COATED ORAL at 08:06

## 2023-02-22 RX ADMIN — CHLORPROMAZINE HYDROCHLORIDE 100 MG: 50 TABLET, FILM COATED ORAL at 08:06

## 2023-02-22 RX ADMIN — DULOXETINE HYDROCHLORIDE 60 MG: 60 CAPSULE, DELAYED RELEASE ORAL at 08:06

## 2023-02-22 RX ADMIN — CLINDAMYCIN HYDROCHLORIDE 300 MG: 150 CAPSULE ORAL at 04:00

## 2023-02-22 NOTE — DISCHARGE SUMMARY
Hospitalist Discharge Summary   Admit Date:  2023 12:22 PM   DC Note date: 2023  Name:  John Alvarado   Age:  45 y.o. Sex:  female  :  1970   MRN:  [id number]   Room:  [facility]  PCP:  None None    Presenting Complaint: Failure To Thrive       Problem List for this Hospitalization (present on admission):    Principal Problem (Resolved): Hyponatremia  Active Problems:    Schizophrenia ([address]    Morbid obesity (HCC)    Bilateral cellulitis of lower leg  Resolved Problems:    Severe sepsis (HCC)    EDU (acute kidney injury) [facility])      Hospital Course:  45 y.o. female with medical history of obesity, schizophrenia who presented with concern for \"failure to thrive\". Family feels that they are no longer able to care for the patient. She has been unable to stand to walk d/t bilateral LE weakness and fatigue. She has had decreased oral intake. No fever or chills but continued cough and sputum productions. Patient recently admitted  -  for CAP versus viral pneumonia. Patient completed 7-day course of cefepime. She was not been able to walk apparently since discharge after admission for pneumonia. She was found to have EDU and hyponatremia, both resolved with IVFs. She was found to have bilateral LE cellulitis so she was started on Clindamycin. Cellulitis improved. She remained stable and was discharged to SNF for further care. Disposition: [facility]  Diet: ADULT DIET; Regular  Code Status: Full Code    Follow Ups:   Follow-up Information     None None Follow up in 1 week(s). Time spent in patient discharge and coordination 38 minutes. Follow up labs/diagnostics (ultimately defer to outpatient provider):  None    Plan was discussed with patient, nursing, and case management. All questions answered. Patient was stable at time of discharge. Instructions given to call a physician or return if any concerns.     Current Discharge Medication List        START taking these medications    Details   [facility] Take 2 tablets by mouth 3 times daily for 14 days  Qty: 84 tablet, Refills: 0      clindamycin (CLEOCIN) 300 MG capsule Take 1 capsule by mouth every 8 (eight) hours for 5 doses  Qty: 5 capsule, Refills: 0           CONTINUE these medications which have CHANGED    Details   ALPRAZolam (XANAX) 1 MG tablet Take 1 tablet by mouth 4 times daily as needed for Anxiety for up to 3 days. Max Daily Amount: 4 mg  Qty: 12 tablet, Refills: 0    Associated Diagnoses: Schizophrenia, unspecified type ([facility] [address]           CONTINUE these medications which have NOT CHANGED    Details   lisinopril (PRINIVIL;ZESTRIL) 20 MG tablet Take 1 tablet by mouth daily  Qty: 30 tablet, Refills: 3      chlorproMAZINE (THORAZINE) 50 MG tablet 100 mg orally in AM, and 100 mg orally in PM  Qty: 60 tablet, Refills: 2      budesonide (PULMICORT) 0.5 MG/2ML nebulizer suspension Take 2 mLs by nebulization in the morning and 2 mLs in the evening.   Qty: 60 each, Refills: 3      albuterol (PROVENTIL) (2.5 MG/3ML) 0.083% nebulizer solution Take 3 mLs by nebulization every 6 hours as needed for Wheezing  Qty: 120 each, Refills: 3      metoprolol tartrate (LOPRESSOR) 25 MG tablet Take 1 tablet by mouth 2 times daily  Qty: 60 tablet, Refills: 3      DULoxetine (CYMBALTA) 60 MG extended release capsule Take 60 mg by mouth daily      OLANZapine (ZYPREXA) 20 MG tablet Take 20 mg by mouth nightly      doxepin (SINEQUAN) 100 MG capsule Take 100 mg by mouth nightly      valproic acid (DEPAKENE) 250 MG capsule Take 1,000 mg by mouth nightly      escitalopram (LEXAPRO) 20 MG tablet Take 20 mg by mouth daily             Procedures done this admission:  * No surgery found *    Consults this admission:  IP WOUND CARE NURSE CONSULT TO EVAL    Echocardiogram results:  01/26/23    TRANSTHORACIC ECHOCARDIOGRAM (TTE) COMPLETE (CONTRAST/BUBBLE/3D PRN) 02/03/2023 10:43 AM, 02/03/2023 12:00 AM (Final)    Interpretation Summary    Left Ventricle: Normal left ventricular systolic function. EF by 2D Simpsons Biplane is 65%. Left ventricle size is normal. Normal wall thickness. Normal wall motion. Normal diastolic function. Aorta: Ao root diameter is 2.7 cm. Ao Root Index is 1.14 cm/m2. Technical qualifiers: Procedure performed with the patient in a supine position, color flow Doppler was performed and pulse wave and/or continuous wave Doppler was performed. Contrast used: Definity. Signed by: Kary Gifford MD on 2/3/2023 10:43 AM, Signed by: Unknown Provider Result on 2/3/2023 12:00 AM      Diagnostic Imaging/Tests:   XR PELVIS (1-2 VIEWS)    Result Date: 2/17/2023  Single AP view of the pelvis without obvious fracture or joint derangement. XR CHEST PORTABLE    Result Date: 2/17/2023  Bilateral airspace opacities with mild improvement on the left. XR CHEST PORTABLE    Result Date: 1/27/2023  1. Worsening pulmonary infiltrates any questionable need small left lateral loculated effusion. This report was made using voice transcription. Despite my best efforts to avoid any, transcription errors may persist. If there is any question about the accuracy of the report or need for clarification, then please call [phone], or text me through [employer] for clarification or correction. XR CHEST PORTABLE    Result Date: 1/26/2023  Bilateral consolidation. XR CHEST 1 VIEW    Result Date: 2/3/2023  Persistent but improving bilateral infiltrates     US RETROPERITONEAL COMPLETE    Result Date: 2/17/2023  Limited evaluation of the left kidney due to difficulty with positioning.    Shea Dubois M.D. 2/17/2023 11:11:00 PM       Labs: Results:       BMP, Mg, Phos Recent Labs     02/20/23  0620 02/21/23  0637 02/22/23  0658    140 141   K 5.0  --   --      --   --    CO2 28  --   --    ANIONGAP 3  --   --    BUN 21  --   --    CREATININE 0.90  --   --    LABGLOM >60  --   -- CALCIUM 8.7  --   --    GLUCOSE 95  --   --       CBC Recent Labs     02/20/23  0620   WBC 6.3   RBC 3.26*   HGB 9.7*   HCT 29.7*   MCV 91.1   MCH 29.8   MCHC 32.7   RDW 15.5*      MPV 10.0   NRBC 0.00   SEGS 50   LYMPHOPCT 31   EOSRELPCT 3   MONOPCT 12   BASOPCT 1   IMMGRAN 3   SEGSABS 3.2   LYMPHSABS 2.0   EOSABS 0.2   MONOSABS 0.8   BASOSABS 0.1   ABSIMMGRAN 0.2      LFT Recent Labs     02/20/23  0620   BILITOT 0.2   ALKPHOS 59   AST 47*   ALT 26   PROT 5.5*   LABALBU 2.3*   GLOB 3.2      Cardiac  Lab Results   Component Value Date/Time    NTPROBNP 919 02/03/2023 08:27 PM    NTPROBNP 141 01/28/2023 10:03 AM    NTPROBNP 43 01/26/2023 01:13 PM    TROPHS 14.9 01/26/2023 06:17 PM    TROPHS 16.4 01/26/2023 01:13 PM      Coags Lab Results   Component Value Date/Time    PROTIME 13.3 01/26/2023 01:13 PM    INR 1.0 01/26/2023 01:13 PM      A1c No results found for: LABA1C, EAG   Lipids No results found for: CHOL, LDLCALC, LABVLDL, HDL, CHOLHDLRATIO, TRIG   Thyroid  No results found for: Cecilia Deter     Most Recent UA Lab Results   Component Value Date/Time    COLORU YELLOW/STRAW 02/17/2023 08:49 PM    APPEARANCE CLEAR 02/17/2023 08:49 PM    SPECGRAV 1.011 02/17/2023 08:49 PM    LABPH 5.0 02/17/2023 08:49 PM    PROTEINU Negative 02/17/2023 08:49 PM    GLUCOSEU Negative 02/17/2023 08:49 PM    KETUA Negative 02/17/2023 08:49 PM    BILIRUBINUR Negative 02/17/2023 08:49 PM    BLOODU Negative 02/17/2023 08:49 PM    UROBILINOGEN 0.2 02/17/2023 08:49 PM    NITRU Negative 02/17/2023 08:49 PM    LEUKOCYTESUR Negative 02/17/2023 08:49 PM        Recent Labs     02/20/23  0838 02/18/23  0548 02/18/23  0400 01/26/23  1316 01/26/23  1300   CULTURE SA target not detected. A MRSA NEGATIVE, SA NEGATIVE test result does not preclude MRSA or SA nasal colonization.  NO GROWTH 4 DAYS NO GROWTH 4 DAYS NO GROWTH 5 DAYS NO GROWTH 5 DAYS       All Labs from Last 24 Hrs:  Recent Results (from the past 24 hour(s))   Sodium    Collection Time: 02/22/23  6:58 AM   Result Value Ref Range    Sodium 141 133 - 143 mmol/L   COVID-19, Rapid    Collection Time: 02/22/23 11:06 AM    Specimen: Nasopharyngeal   Result Value Ref Range    Source NASAL      SARS-CoV-2, Rapid Not detected NOTD         Allergies   Allergen Reactions    Penicillins Hives     Immunization History   Administered Date(s) Administered    PPD Test 01/26/2023, 02/17/2023       Recent Vital Data:  Patient Vitals for the past 24 hrs:   Temp Pulse Resp BP SpO2   02/22/23 1126 98.2 °F (36.8 °C) 93 16 (!) 115/52 90 %   02/22/23 0805 97.3 °F (36.3 °C) (!) 101 18 107/61 90 %   02/22/23 0715 -- 91 20 -- 90 %   02/22/23 0513 97.9 °F (36.6 °C) 98 22 (!) 141/79 92 %   02/21/23 2334 98.1 °F (36.7 °C) 91 20 137/80 91 %   02/21/23 1944 97.3 °F (36.3 °C) 98 22 128/77 94 %   02/21/23 1905 -- 95 18 -- 94 %   02/21/23 1536 97.8 °F (36.6 °C) 93 18 128/63 96 %       Oxygen Therapy  SpO2: 90 %  Pulse Oximetry Type: Intermittent  Pulse via Oximetry: 97 beats per minute  Pulse Oximeter Device Mode: Intermittent  O2 Device: None (Room air)    Estimated body mass index is 49.28 kg/m² as calculated from the following:    Height as of this encounter: 5' 6\" (1.676 m). Weight as of this encounter: 305 lb 5.4 oz (138.5 kg). Intake/Output Summary (Last 24 hours) at 2/22/2023 1243  Last data filed at 2/22/2023 0805  Gross per 24 hour   Intake --   Output 2975 ml   Net -2975 ml         Physical Exam:  General:    Well nourished. No overt distress  Head:  Normocephalic, atraumatic  Eyes:  Sclerae appear normal.  Pupils equally round. HENT:  Nares appear normal, no drainage. Moist mucous membranes  Neck:  No restricted ROM. Trachea midline  CV:   RRR. No m/r/g. No JVD  Lungs:   CTAB. No wheezing, rhonchi, or rales. Respirations even, unlabored  Abdomen:   Soft, nontender, nondistended. Extremities: Warm and dry. No cyanosis or clubbing.   1+ BLE edema, BLE shin erythema  Skin:     No rashes. Normal coloration  Neuro:  CN II-XII grossly intact. Repeats certain phrases, not clear train of thought  Psych:  Normal mood and affect.     Signed:  Greg Barnhart DO  2/22/2023

## 2023-02-22 NOTE — PROGRESS NOTES
ACUTE PHYSICAL THERAPY GOALS:   (Developed with and agreed upon by patient and/or caregiver.)  (1.) Chris Villavicencioo  will move from supine to sit and sit to supine , scoot up and down, and roll side to side with CONTACT GUARD ASSIST within 7 treatment day(s). (2.) Rebbecrina Villavicencioo will transfer from sitting to standing with MODERATE ASSIST using the least restrictive device within 7 treatment day(s). (2.) Milagrosbecrina Villavicencioo will transfer from bed to chair and chair to bed with MODERATE ASSIST using the least restrictive device within 7 treatment day(s). (3.) Chris Villavicencioo will ambulate with MINIMAL ASSIST for 10 feet with the least restrictive device within 7 treatment day(s). (4.) Chris Villavicencioo will perform standing static balance activities x 3 minutes and seated dynamic balance activities x 15 minutes  with MINIMAL ASSIST to improve safety within 7 treatment day(s). (6.) Chris Villavicencioo will perform bilateral lower extremity exercises x 10 min for HEP with STAND BY ASSIST to improve strength, endurance, and functional mobility within 7 treatment day(s). PHYSICAL THERAPY: Daily Note AM   (Link to Caseload Tracking: PT Visit Days : 3  Time In/Out PT Charge Capture  Rehab Caseload Tracker  Orders    Chris De Anda is a 45 y.o. female   PRIMARY DIAGNOSIS: Hyponatremia  Acute hyponatremia [E87.1]  Hyponatremia [E87.1]  Acute kidney injury ([facility] [address] [N17.9]       Inpatient: Payor: Niki Srinivasan / Plan: Julienne Lagos / Product Type: *No Product type* /     ASSESSMENT:     REHAB RECOMMENDATIONS:   Recommendation to date pending progress:  Setting:  Short-term Rehab    Equipment:    To Be Determined     ASSESSMENT:  Ms. Rupa Brooks was supine in bed on arrival. She is agreeable to PT. Supine to sit on EOB with min/mod A x2. She tolerated sitting edge of bed with SBA. She required constant redirecting to stay on task.  Sit to stand multiple times with min A x2 for first time but progressed to needing mod/max x2 after a couple times due to increased anxiety. She was able to take side steps to head of bed with min A x2 with much encouragement. After sitting, she leaned posteriorly hard and began to cry out. She was able to calm down and scoot back in the bed. Max A x2 for sit to supine. Rolling for brief change required mod to mod A x2. She was able to scoot up in the bed with mod A x2 with bed in trendelenburg. Pt left supine with needs in reach.         SUBJECTIVE:   Ms. Carolyn Bassett states, \"cut price is right on\"     Social/Functional Lives With: Parent  Type of Home: House  Home Layout: One level  Home Access: Ramped entrance  Bathroom Shower/Tub: Walk-in shower  Home Equipment: Yris Mowers, standard  Has the patient had two or more falls in the past year or any fall with injury in the past year?: Yes  Receives Help From: Family  ADL Assistance: Needs assistance  Homemaking Assistance: Needs assistance  Homemaking Responsibilities: Yes  Ambulation Assistance: Needs assistance  Transfer Assistance: Independent  Active : No  OBJECTIVE:     PAIN: Anastasiia Salen / O2: Giulia Bravo / Claritza Durham / Emma Purple:   Pre Treatment: 0/10         Post Treatment: 0/10 Vitals        Oxygen    Purewick    RESTRICTIONS/PRECAUTIONS:  Restrictions/Precautions  Restrictions/Precautions: Fall Risk  Restrictions/Precautions: Fall Risk     MOBILITY: I Mod I S SBA CGA Min Mod Max Total  NT x2 Comments:   Bed Mobility    Rolling [] [] [] [] [] [] [x] [] [] [] [x]    Supine to Sit [] [] [] [] [] [x] [x] [] [] [] [x]    Scooting [] [] [] [] [] [] [x] [x] [] [] [x]    Sit to Supine [] [] [] [] [] [] [x] [x] [] [] [x]    Transfers    Sit to Stand [] [] [] [] [] [x] [x] [x] [] [] [x] More assist with each stand   Bed to Chair [] [] [] [] [] [] [] [] [] [] []    Stand to Sit [] [] [] [] [] [x] [] [] [] [] [x]     [] [] [] [] [] [] [] [] [] [] []    I=Independent, Mod I=Modified Independent, S=Supervision, SBA=Standby Assistance, CGA=Contact Guard Assistance, Min=Minimal Assistance, Mod=Moderate Assistance, Max=Maximal Assistance, Total=Total Assistance, NT=Not Tested    BALANCE: Good Fair+ Fair Fair- Poor NT Comments   Sitting Static [] [x] [] [] [] []    Sitting Dynamic [] [x] [] [] [] []              Standing Static [] [] [x] [] [] []    Standing Dynamic [] [] [] [x] [] []      GAIT: I Mod I S SBA CGA Min Mod Max Total  NT x2 Comments:   Level of Assistance [] [] [] [] [] [x] [] [] [] [] [x]    Distance 4 feet    DME Rolling Walker    Gait Quality Decreased leon , Decreased step clearance, and Decreased step length    Weightbearing Status      Stairs      I=Independent, Mod I=Modified Independent, S=Supervision, SBA=Standby Assistance, CGA=Contact Guard Assistance,   Min=Minimal Assistance, Mod=Moderate Assistance, Max=Maximal Assistance, Total=Total Assistance, NT=Not Tested    PLAN:   FREQUENCY AND DURATION: 3 times/week for duration of hospital stay or until stated goals are met, whichever comes first.    TREATMENT:   TREATMENT:   Therapeutic Activity (38 Minutes): Therapeutic activity included Rolling, Supine to Sit, Sit to Supine, Scooting, Sitting balance , and Standing balance to improve functional Activity tolerance, Balance, Coordination, Mobility, and Strength.     TREATMENT GRID:  N/A    AFTER TREATMENT PRECAUTIONS: Bed, Bed/Chair Locked, Call light within reach, Needs within reach, RN notified, and Visitors at bedside    INTERDISCIPLINARY COLLABORATION:  RN/ PCT and PT/ ANUPAMA    EDUCATION:      TIME IN/OUT:  Time In: 1041  Time Out: [address]  Minutes: ANUPAMA Elaine

## 2023-02-22 NOTE — PLAN OF CARE
Problem: Respiratory - Adult  Goal: Achieves optimal ventilation and oxygenation  Outcome: Progressing  Flowsheets (Taken 2/22/2023 0921)  Achieves optimal ventilation and oxygenation:   Assess for changes in respiratory status   Assess for changes in mentation and behavior   Position to facilitate oxygenation and minimize respiratory effort   Oxygen supplementation based on oxygen saturation or arterial blood gases

## 2023-02-22 NOTE — CARE COORDINATION
XAVIER notified by [facility] with [place] that patient has been approved by insurance. Bed available today - patient can go to room 104, Report [phone]. DO made aware that patient can discharge to facility today. CM attempted to call patient's mother, unable to reach her. She is also not in the room. CM will wait to meet/speak with her before discharging patient to facility. 1:45 - CM has attempted to call patient's mother multiple times, unable to reach her. CM arranged transport for 4:00 via [facility]. RN and liaison, [facility] aware of transport time. Packet will be completed once patient's mother arrives (there are questions on the PASARR that need to be answered by mother). D/C summary and AVS faxed to [facility]. 2:00 - CM met with patient with mother and aunt at bedside. They are aware that patient is discharging to facility at 4:00. All questions answered on PASARR. Packet completed (including script) and placed in patient's chart. No other needs identified at this time. 3:25 - [facility] called. They will be here closer to 4:30. RN and liaison aware. Patient's mother is no longer at bedside to advise and CM has been unsuccessful reaching her by phone.      ASSESSMENT NOTE    Attending Physician: Armand Elias,   Admit Problem: Acute hyponatremia [E87.1]  Hyponatremia [E87.1]  Acute kidney injury ([address] [N17.9]  Date/Time of Admission: 2/17/2023 12:22 PM  Problem List:  Patient Active Problem List   Diagnosis    Schizophrenia ([address]    Acute metabolic encephalopathy    Acute hypoxemic respiratory failure (HCC)    Morbid obesity (HCC)    Tachycardia    Bilateral cellulitis of lower leg       Service Assessment  Patient Orientation Alert and Oriented   Cognition Alert   History Provided By Patient, Medical Record   Primary Caregiver Family (Mother)   Accompanied By/Relationship     Support Systems Parent, Family Members   Patient's Healthcare Decision Maker is: Legal Next of Monica 67 (Mother)   PCP Verified by CM Yes   Last Visit to PCP Within last 3 months   Prior Functional Level     Current Functional Level Assistance with the following:, Mobility   Can patient return to prior living arrangement Other (see comment) (rehab recommended)   Ability to make needs known: Good   Family able to assist with home care needs: Yes   Would you like for me to discuss the discharge plan with any other family members/significant others, and if so, who? Yes (Mother)   Financial Resources Medicare ([id number] [employer])   Community Resources     CM/SW Referral       Social/Functional History  Lives With Parent   Type of [address] [facility] One level   Home Access Ramped entrance   [address] - [address] of Steps     Entrance Stairs - Rails     Bathroom Shower/Tub Walk-in shower   [address], [address] [facility] Work     Driving     Shopping          Other (Comment)     Homemaking Responsibilities Yes   [address] Paying/Finance [address] [facility] Management     Other (Comment)     Ambulation Assistance Needs assistance   Transfer Assistance Independent   Active  No   Patient's  Info     Mode of Transportation     Education     Occupation     Type of Occupation       Discharge Planning   Type of [address] Parent, Family Members   Current Services Prior To Admission [address] [place] [address] (SNF rehab)   DME     DME     DME Ordered?  No   Potential Assistance Purchasing Medications No   Meds-to-Beds: Does the patient want to have any new prescriptions delivered to bedside prior to discharge? Type of Home Care Services PT, OT   Patient expects to be discharged to: Skilled nursing facility (SNF STR)   Follow Up Appointment: Best Day/Time     One/Two Story Residence:     # of Interior Steps     Height of Each Step (in)     [employer] Available     History of Falls? Services At/After Discharge  Transition of Care Consult (CM Consult): Discharge Planning, SNF   Internal Home Health     Internal Hospice     Reason Outside Agency [address]     Partner SNF     Reason Why Partner SNF Not Chosen     Internal Comfort Care     Reason Outside [address] [facility]charge [facility] (SNF), Transport   [employer] Information Provided? No   Mode of Transport at Discharge [address] Time of Discharge     Confirm Follow Up Transport Family     Condition of Participation: Discharge Planning  The plan for Transition of Care is related to the following treatment goals: Pt will need rehab to return to the community at her functional baseline. The Patient and/or Patient Representative was provided with a Choice of Provider? Patient   Name of the Patient Representative who was provided with the Choice of Provider and agrees with the Discharge Plan? The Patient and/or Patient Representative Agree with the Discharge Plan? Yes   Freedom of Choice list was provided with basic dialogue that supports the individualized plan of care/goals, treatment preferences, and shares the quality data associated with the providers?  Yes     Documentation for Discharge Appeal  Discharge Appealed by     Date notified by [facility]IO of appeal request:     Time notified by QIO of appeal request:     Detailed Notice of Discharge given to:     Date Notice of Discharge given:     Time Notice of Discharge given:     Date records sent to QIO     Time records sent to Ranjan Little     Date Notified of Outcome     Time Notified of Outcome     Outcome of appeal           [address] [facility] 02/22/23 2:01 PM

## 2023-02-22 NOTE — PROGRESS NOTES
ACUTE OCCUPATIONAL THERAPY GOALS:   (Developed with and agreed upon by patient and/or caregiver.)  1. Patient will complete lower body bathing and dressing with Min A and adaptive equipment as   needed. 2. Patient will completed upper body bathing and dressing with SBA and adaptive equipment as needed. 3. Patient will complete grooming seated at sink with SBA and adaptive equipment as needed. 4. Patient will complete toileting with Min A and adaptive equipment as needed. 5. Patient will tolerate 30 minutes of OT treatment with 1-2 rest breaks to increase activity tolerance for ADLs. 6. Patient will complete functional transfers with CGA and adaptive equipment as needed. Timeframe: 7 visits    OCCUPATIONAL THERAPY: Daily Note    OT Visit Days: 2   Time In/Out  OT Charge Capture  Rehab Caseload Tracker  OT Orders    Jose R Amaya is a 45 y.o. female   PRIMARY DIAGNOSIS: Hyponatremia  Acute hyponatremia [E87.1]  Hyponatremia [E87.1]  Acute kidney injury ([facility] [address] [N17.9]       Inpatient: Payor: [address] [place] [address] Ave / Plan: Sherron Durand COMPLETE / Product Type: *No Product type* /     ASSESSMENT:     REHAB RECOMMENDATIONS: CURRENT LEVEL OF FUNCTION:  (Most Recently Demonstrated)   Recommendation to date pending progress:  Setting:  Short-term Rehab    Equipment:    To Be Determined Bathing:  Contact Guard Assist UB  Dressing: Moderate Assist LB, minimal assistance UB  Feeding/Grooming:  Contact Guard Assist  Toileting:  Maximal Assist  Functional Mobility:  Minimal Assist x2 but progressed to maxA x2 by end of session     ASSESSMENT:  Ms. Sincere Diaz is progressing well towards OT goals. Today, pt was received supine in bed. Completed supine>sit Marcelle. ModA for LB dressing. Completed UB bathing/dressing and simple grooming tasks sitting EOB with CGA-Marcelle. Sit>stand Marcelle x2 but progressed to mod-maxA x2 with following stands and for side steps EOB.  Pt noted to panic and become very anxious resulting in her need for increased assistance. Pt require re-assurance in order to became calm and follow simple commands again. Pt also required frequent multimodal cueing throughout session for follow through of simple, one-step commands. Pt easily distracted and required re-direction throughout session. Ms. Benedict Cherry continues to demonstrate overall deficits in strength, balance, activity tolerance, and ADL performance. Continue OT efforts and POC in order to address functional deficits and OT goals stated above. SUBJECTIVE:     Ms. Benedict Cherry states, \"We arent going to smoke cigarettes, right? \"     Social/Functional Lives With: Parent  Type of Home: House  Home Layout: One level  Home Access: Ramped entrance  Bathroom Shower/Tub: [address]: Dewaine Patric, standard  Has the patient had two or more falls in the past year or any fall with injury in the past year?: Yes  Receives Help From: Family  ADL Assistance: Needs assistance  Homemaking Assistance: Needs assistance  Homemaking Responsibilities: Yes  Ambulation Assistance: Needs assistance  Transfer Assistance: Independent  Active : No    OBJECTIVE:     Millicent Escoto / [place] Rein / AIRWAY: NA    RESTRICTIONS/PRECAUTIONS:  Restrictions/Precautions  Restrictions/Precautions: Fall Risk        PAIN: VITALS / O2:   Pre Treatment:   Pain Assessment: None - Denies Pain        Post Treatment: no change  Vitals          Oxygen        MOBILITY: I Mod I S SBA CGA Min Mod Max Total  NT x2 Comments:   Bed Mobility    Rolling [] [] [] [] [] [x] [] [] [] [] []    Supine to Sit [] [] [] [] [] [x] [] [] [] [] []    Scooting [] [] [] [] [] [] [] [] [] [x] []    Sit to Supine [] [] [] [] [] [] [] [x] [] [] [x] Attempted to lie down unsafely due to anxiety/panicking    Transfers    Sit to Stand [] [] [] [] [] [] [] [] [] [] [] Initially Marcelle x2 but progressed to maxA x2 due to anxiety/panicking    Bed to Chair [] [] [] [] [] [] [] [] [] [x] []    Stand to Sit [] [] [] [] [] [] [x] [] [] [] [] Tub/Shower [] [] [] [] [] [] [] [] [] [x] []     Toilet [] [] [] [] [] [] [] [] [] [x] []      [] [] [] [] [] [] [] [] [] [] []    I=Independent, Mod I=Modified Independent, S=Supervision/Setup, SBA=Standby Assistance, CGA=Contact Guard Assistance, Min=Minimal Assistance, Mod=Moderate Assistance, Max=Maximal Assistance, Total=Total Assistance, NT=Not Tested    ACTIVITIES OF DAILY LIVING: I Mod I S SBA CGA Min Mod Max Total NT Comments   BASIC ADLs:              Upper Body   Bathing [] [] [] [] [x] [] [] [] [] [] Sitting EOB   Lower Body Bathing [] [] [] [] [] [] [] [] [] [x]    Toileting [] [] [] [] [] [] [] [x] [] [] Soiled in BM and urine--maxA for antoine-care and brief management in supine    Upper Body Dressing [] [] [] [] [] [x] [] [] [] [] Gown change    Lower Body Dressing [] [] [] [] [] [] [x] [] [] [] Socks    Feeding [] [] [] [] [] [] [] [] [] [x]    Grooming [] [] [] [] [x] [] [] [] [] [] Washed face sitting EOB   Personal Device Care [] [] [] [] [] [] [] [] [] [x]    Functional Mobility [] [] [] [] [] [] [] [] [] [] Marcelle x2 but progressed to maxA x2 by end of session   I=Independent, Mod I=Modified Independent, S=Supervision/Setup, SBA=Standby Assistance, CGA=Contact Guard Assistance, Min=Minimal Assistance, Mod=Moderate Assistance, Max=Maximal Assistance, Total=Total Assistance, NT=Not Tested    BALANCE: Good Fair+ Fair Fair- Poor NT Comments   Sitting Static [] [x] [] [] [] []    Sitting Dynamic [] [] [x] [] [] []              Standing Static [] [] [] [x] [] []    Standing Dynamic [] [] [] [] [x] []        PLAN:     FREQUENCY/DURATION   OT Plan of Care: 3 times/week for duration of hospital stay or until stated goals are met, whichever comes first.    TREATMENT:     TREATMENT:   Co-Treatment PT/OT necessary due to patient's decreased overall endurance/tolerance levels, as well as need for high level skilled assistance to complete functional transfers/mobility and functional tasks  Neuromuscular Re-education (15 Minutes): Patient participated in neuromuscular re-education including functional reaching, weight shifting, postural training, midline training, standing tolerance activity , and sitting balance activity   with maximal verbal cues and tactile cues to improve sitting balance, standing balance, posture, coordination, static balance, and dynamic balance in order to prepare for functional task, prepare for seated ADLs, prepare for standing ADLs, prepare for functional transfer, increase safety awareness, and prepare for self care. .   Self Care (23 minutes): Patient participated in upper body bathing, toileting, upper body dressing, lower body dressing, and grooming ADLs in unsupported sitting and supine with maximal verbal cueing to increase independence, decrease assistance required, increase activity tolerance, and increase safety awareness. Patient also participated in functional mobility and functional transfer training to increase independence, decrease assistance required, increase activity tolerance, and increase safety awareness.      TREATMENT GRID:  N/A    AFTER TREATMENT PRECAUTIONS: Alarm Activated, Bed, Call light within reach, Needs within reach, and RN notified    INTERDISCIPLINARY COLLABORATION:  RN/ PCT, PT/ PTA, and OT/ SUN    EDUCATION:       TOTAL TREATMENT DURATION AND TIME:  Time In: 1041  Time Out: [address]  Minutes: [address] [facility], OT

## 2023-02-23 LAB
BACTERIA SPEC CULT: NORMAL
BACTERIA SPEC CULT: NORMAL
SERVICE CMNT-IMP: NORMAL
SERVICE CMNT-IMP: NORMAL

## 2024-04-12 ENCOUNTER — APPOINTMENT (OUTPATIENT)
Dept: GENERAL RADIOLOGY | Age: 54
DRG: 689 | End: 2024-04-12
Payer: MEDICARE

## 2024-04-12 ENCOUNTER — HOSPITAL ENCOUNTER (INPATIENT)
Age: 54
LOS: 5 days | Discharge: ANOTHER HOSPITAL | DRG: 689 | End: 2024-04-17
Attending: EMERGENCY MEDICINE | Admitting: FAMILY MEDICINE
Payer: MEDICARE

## 2024-04-12 DIAGNOSIS — E66.01 MORBID OBESITY (HCC): ICD-10-CM

## 2024-04-12 DIAGNOSIS — L89.90 PRESSURE ULCERS OF SKIN OF MULTIPLE TOPOGRAPHIC SITES: ICD-10-CM

## 2024-04-12 DIAGNOSIS — N39.0 URINARY TRACT INFECTION WITHOUT HEMATURIA, SITE UNSPECIFIED: Primary | ICD-10-CM

## 2024-04-12 DIAGNOSIS — R41.82 ALTERED MENTAL STATUS, UNSPECIFIED ALTERED MENTAL STATUS TYPE: ICD-10-CM

## 2024-04-12 LAB
ALBUMIN SERPL-MCNC: 2.5 G/DL (ref 3.5–5)
ALBUMIN/GLOB SERPL: 0.5 (ref 0.4–1.6)
ALP SERPL-CCNC: 103 U/L (ref 50–136)
ALT SERPL-CCNC: 29 U/L (ref 12–65)
ANION GAP SERPL CALC-SCNC: 5 MMOL/L (ref 2–11)
APPEARANCE UR: ABNORMAL
AST SERPL-CCNC: 68 U/L (ref 15–37)
BACTERIA URNS QL MICRO: ABNORMAL /HPF
BASOPHILS # BLD: 0 K/UL (ref 0–0.2)
BASOPHILS NFR BLD: 0 % (ref 0–2)
BILIRUB SERPL-MCNC: 0.4 MG/DL (ref 0.2–1.1)
BILIRUB UR QL: NEGATIVE
BUN SERPL-MCNC: 27 MG/DL (ref 6–23)
CALCIUM SERPL-MCNC: 9.3 MG/DL (ref 8.3–10.4)
CHLORIDE SERPL-SCNC: 103 MMOL/L (ref 103–113)
CO2 SERPL-SCNC: 27 MMOL/L (ref 21–32)
COLOR UR: ABNORMAL
CREAT SERPL-MCNC: 1.33 MG/DL (ref 0.6–1)
CRP SERPL-MCNC: 25.3 MG/DL (ref 0–0.9)
DIFFERENTIAL METHOD BLD: ABNORMAL
EOSINOPHIL # BLD: 0.1 K/UL (ref 0–0.8)
EOSINOPHIL NFR BLD: 1 % (ref 0.5–7.8)
EPI CELLS #/AREA URNS HPF: ABNORMAL /HPF
ERYTHROCYTE [DISTWIDTH] IN BLOOD BY AUTOMATED COUNT: 13.7 % (ref 11.9–14.6)
ERYTHROCYTE [SEDIMENTATION RATE] IN BLOOD: 58 MM/HR (ref 0–30)
GLOBULIN SER CALC-MCNC: 4.7 G/DL (ref 2.8–4.5)
GLUCOSE SERPL-MCNC: 102 MG/DL (ref 65–100)
GLUCOSE UR STRIP.AUTO-MCNC: NEGATIVE MG/DL
HCT VFR BLD AUTO: 29.4 % (ref 35.8–46.3)
HGB BLD-MCNC: 9.4 G/DL (ref 11.7–15.4)
HGB UR QL STRIP: NEGATIVE
IMM GRANULOCYTES # BLD AUTO: 0.1 K/UL (ref 0–0.5)
IMM GRANULOCYTES NFR BLD AUTO: 1 % (ref 0–5)
KETONES UR QL STRIP.AUTO: 15 MG/DL
LACTATE SERPL-SCNC: 1.4 MMOL/L (ref 0.4–2)
LEUKOCYTE ESTERASE UR QL STRIP.AUTO: ABNORMAL
LYMPHOCYTES # BLD: 1.3 K/UL (ref 0.5–4.6)
LYMPHOCYTES NFR BLD: 9 % (ref 13–44)
MCH RBC QN AUTO: 29.3 PG (ref 26.1–32.9)
MCHC RBC AUTO-ENTMCNC: 32 G/DL (ref 31.4–35)
MCV RBC AUTO: 91.6 FL (ref 82–102)
MONOCYTES # BLD: 1 K/UL (ref 0.1–1.3)
MONOCYTES NFR BLD: 7 % (ref 4–12)
NEUTS SEG # BLD: 12.3 K/UL (ref 1.7–8.2)
NEUTS SEG NFR BLD: 83 % (ref 43–78)
NITRITE UR QL STRIP.AUTO: POSITIVE
NRBC # BLD: 0 K/UL (ref 0–0.2)
OTHER OBSERVATIONS: ABNORMAL
PH UR STRIP: 6 (ref 5–9)
PLATELET # BLD AUTO: 411 K/UL (ref 150–450)
PMV BLD AUTO: 9.3 FL (ref 9.4–12.3)
POTASSIUM SERPL-SCNC: 4.1 MMOL/L (ref 3.5–5.1)
PROCALCITONIN SERPL-MCNC: <0.05 NG/ML (ref 0–0.49)
PROT SERPL-MCNC: 7.2 G/DL (ref 6.3–8.2)
PROT UR STRIP-MCNC: ABNORMAL MG/DL
RBC # BLD AUTO: 3.21 M/UL (ref 4.05–5.2)
RBC #/AREA URNS HPF: ABNORMAL /HPF
SODIUM SERPL-SCNC: 135 MMOL/L (ref 136–146)
SP GR UR REFRACTOMETRY: 1.02 (ref 1–1.02)
TROPONIN I SERPL HS-MCNC: 3.1 PG/ML (ref 0–14)
UROBILINOGEN UR QL STRIP.AUTO: 0.2 EU/DL (ref 0.2–1)
WBC # BLD AUTO: 14.9 K/UL (ref 4.3–11.1)
WBC URNS QL MICRO: >100 /HPF
YEAST URNS QL MICRO: ABNORMAL

## 2024-04-12 PROCEDURE — 80164 ASSAY DIPROPYLACETIC ACD TOT: CPT

## 2024-04-12 PROCEDURE — 87086 URINE CULTURE/COLONY COUNT: CPT

## 2024-04-12 PROCEDURE — 1100000000 HC RM PRIVATE

## 2024-04-12 PROCEDURE — 84145 PROCALCITONIN (PCT): CPT

## 2024-04-12 PROCEDURE — 73630 X-RAY EXAM OF FOOT: CPT

## 2024-04-12 PROCEDURE — 80307 DRUG TEST PRSMV CHEM ANLYZR: CPT

## 2024-04-12 PROCEDURE — 83605 ASSAY OF LACTIC ACID: CPT

## 2024-04-12 PROCEDURE — 2580000003 HC RX 258: Performed by: EMERGENCY MEDICINE

## 2024-04-12 PROCEDURE — 87040 BLOOD CULTURE FOR BACTERIA: CPT

## 2024-04-12 PROCEDURE — 6360000002 HC RX W HCPCS: Performed by: EMERGENCY MEDICINE

## 2024-04-12 PROCEDURE — 85025 COMPLETE CBC W/AUTO DIFF WBC: CPT

## 2024-04-12 PROCEDURE — 81001 URINALYSIS AUTO W/SCOPE: CPT

## 2024-04-12 PROCEDURE — 96365 THER/PROPH/DIAG IV INF INIT: CPT

## 2024-04-12 PROCEDURE — 93005 ELECTROCARDIOGRAM TRACING: CPT | Performed by: EMERGENCY MEDICINE

## 2024-04-12 PROCEDURE — 85652 RBC SED RATE AUTOMATED: CPT

## 2024-04-12 PROCEDURE — 71045 X-RAY EXAM CHEST 1 VIEW: CPT

## 2024-04-12 PROCEDURE — 1100000003 HC PRIVATE W/ TELEMETRY

## 2024-04-12 PROCEDURE — 96366 THER/PROPH/DIAG IV INF ADDON: CPT

## 2024-04-12 PROCEDURE — 87088 URINE BACTERIA CULTURE: CPT

## 2024-04-12 PROCEDURE — 84484 ASSAY OF TROPONIN QUANT: CPT

## 2024-04-12 PROCEDURE — 80053 COMPREHEN METABOLIC PANEL: CPT

## 2024-04-12 PROCEDURE — 86140 C-REACTIVE PROTEIN: CPT

## 2024-04-12 PROCEDURE — 87186 SC STD MICRODIL/AGAR DIL: CPT

## 2024-04-12 PROCEDURE — 99285 EMERGENCY DEPT VISIT HI MDM: CPT

## 2024-04-12 RX ORDER — 0.9 % SODIUM CHLORIDE 0.9 %
30 INTRAVENOUS SOLUTION INTRAVENOUS ONCE
Status: COMPLETED | OUTPATIENT
Start: 2024-04-12 | End: 2024-04-12

## 2024-04-12 RX ADMIN — Medication 2500 MG: at 18:15

## 2024-04-12 RX ADMIN — PIPERACILLIN AND TAZOBACTAM 4500 MG: 4; .5 INJECTION, POWDER, FOR SOLUTION INTRAVENOUS at 19:12

## 2024-04-12 RX ADMIN — SODIUM CHLORIDE 2721 ML: 9 INJECTION, SOLUTION INTRAVENOUS at 18:16

## 2024-04-12 ASSESSMENT — PAIN - FUNCTIONAL ASSESSMENT: PAIN_FUNCTIONAL_ASSESSMENT: NONE - DENIES PAIN

## 2024-04-12 ASSESSMENT — LIFESTYLE VARIABLES: HOW OFTEN DO YOU HAVE A DRINK CONTAINING ALCOHOL: NEVER

## 2024-04-12 NOTE — ED TRIAGE NOTES
Pt arrives via EMS from home. Ems called out due to dark urine and increasing weakness. Family at home has had a harder time taking care of her, EMS states possible bed sore noticed when lift assist.

## 2024-04-12 NOTE — ED PROVIDER NOTES
Emergency Department Provider Note       PCP: None, None   Age: 53 y.o.   Sex: female     DISPOSITION Decision To Admit 04/12/2024 07:52:17 PM       ICD-10-CM    1. Urinary tract infection without hematuria, site unspecified  N39.0       2. Altered mental status, unspecified altered mental status type  R41.82       3. Pressure ulcers of skin of multiple topographic sites  L89.90       4. Morbid obesity (HCC)  E66.01           Medical Decision Making     53-year-old female patient sent from home with concerns over decreased responsiveness and failure to thrive  Found to have a UTI today  Discussed case with aunt at bedside who spends time almost daily helping to care for the patient in her home.  Patient's mother has been the primary caregiver but she has gait problems and recently broke her foot  Her workup today revealed a mild leukocytosis but no evidence of sepsis  CRP sed rate elevated but no evidence of acute osteo on either foot image  We will consult the hospitalist for admission     1 or more acute illnesses that pose a threat to life or bodily function.   Discussion with external consultants.  Chronic medical problems impacting care include schizophrenia obesity failure to thrive.  Shared medical decision making was utilized in creating the patients health plan today.    I independently ordered and reviewed each unique test.     The patients assessment required an independent historian: Aunt at bedside.  The reason they were needed is important historical information not provided by the patient.  I interpreted the X-rays plain images of the chest revealed no evidence of acute infiltrate, study is relatively rotated.  Plain images of both feet reveal no evidence of fracture or osteomyelitis.    The patient was admitted and I have discussed patient management with the admitting provider.    Exclusion criteria - the patient is NOT to be included for SEP-1 Core Measure due to: May have criteria for sepsis,

## 2024-04-13 ENCOUNTER — APPOINTMENT (OUTPATIENT)
Dept: CT IMAGING | Age: 54
DRG: 689 | End: 2024-04-13
Payer: MEDICARE

## 2024-04-13 PROBLEM — B37.2 CANDIDAL INTERTRIGO: Status: ACTIVE | Noted: 2024-04-13

## 2024-04-13 PROBLEM — L89.90 PRESSURE ULCERS OF SKIN OF MULTIPLE TOPOGRAPHIC SITES: Status: ACTIVE | Noted: 2024-04-13

## 2024-04-13 LAB
AMPHET UR QL SCN: NEGATIVE
BARBITURATES UR QL SCN: NEGATIVE
BENZODIAZ UR QL: POSITIVE
CANNABINOIDS UR QL SCN: NEGATIVE
COCAINE UR QL SCN: NEGATIVE
CREAT SERPL-MCNC: 1.07 MG/DL (ref 0.6–1)
EKG ATRIAL RATE: 96 BPM
EKG DIAGNOSIS: NORMAL
EKG P AXIS: 74 DEGREES
EKG P-R INTERVAL: 152 MS
EKG Q-T INTERVAL: 392 MS
EKG QRS DURATION: 152 MS
EKG QTC CALCULATION (BAZETT): 496 MS
EKG R AXIS: 138 DEGREES
EKG T AXIS: 6 DEGREES
EKG VENTRICULAR RATE: 96 BPM
METHADONE UR QL: NEGATIVE
OPIATES UR QL: NEGATIVE
PCP UR QL: NEGATIVE
VALPROATE SERPL-MCNC: 47 UG/ML (ref 50–100)
VANCOMYCIN SERPL-MCNC: 10.7 UG/ML

## 2024-04-13 PROCEDURE — 2580000003 HC RX 258: Performed by: INTERNAL MEDICINE

## 2024-04-13 PROCEDURE — 2580000003 HC RX 258: Performed by: FAMILY MEDICINE

## 2024-04-13 PROCEDURE — 1100000003 HC PRIVATE W/ TELEMETRY

## 2024-04-13 PROCEDURE — 73701 CT LOWER EXTREMITY W/DYE: CPT

## 2024-04-13 PROCEDURE — 80202 ASSAY OF VANCOMYCIN: CPT

## 2024-04-13 PROCEDURE — 94760 N-INVAS EAR/PLS OXIMETRY 1: CPT

## 2024-04-13 PROCEDURE — 2500000003 HC RX 250 WO HCPCS: Performed by: INTERNAL MEDICINE

## 2024-04-13 PROCEDURE — 6360000002 HC RX W HCPCS: Performed by: FAMILY MEDICINE

## 2024-04-13 PROCEDURE — 6360000002 HC RX W HCPCS: Performed by: INTERNAL MEDICINE

## 2024-04-13 PROCEDURE — 70450 CT HEAD/BRAIN W/O DYE: CPT

## 2024-04-13 PROCEDURE — 1100000000 HC RM PRIVATE

## 2024-04-13 PROCEDURE — 6360000004 HC RX CONTRAST MEDICATION: Performed by: INTERNAL MEDICINE

## 2024-04-13 PROCEDURE — 2500000003 HC RX 250 WO HCPCS: Performed by: FAMILY MEDICINE

## 2024-04-13 PROCEDURE — 94761 N-INVAS EAR/PLS OXIMETRY MLT: CPT

## 2024-04-13 PROCEDURE — 82565 ASSAY OF CREATININE: CPT

## 2024-04-13 RX ORDER — SODIUM CHLORIDE 9 MG/ML
INJECTION, SOLUTION INTRAVENOUS PRN
Status: DISCONTINUED | OUTPATIENT
Start: 2024-04-13 | End: 2024-04-17 | Stop reason: HOSPADM

## 2024-04-13 RX ORDER — ACETAMINOPHEN 650 MG/1
650 SUPPOSITORY RECTAL EVERY 6 HOURS PRN
Status: DISCONTINUED | OUTPATIENT
Start: 2024-04-13 | End: 2024-04-17 | Stop reason: HOSPADM

## 2024-04-13 RX ORDER — DEXTROSE AND SODIUM CHLORIDE 5; .45 G/100ML; G/100ML
INJECTION, SOLUTION INTRAVENOUS CONTINUOUS
Status: DISCONTINUED | OUTPATIENT
Start: 2024-04-13 | End: 2024-04-14

## 2024-04-13 RX ORDER — SODIUM CHLORIDE 0.9 % (FLUSH) 0.9 %
5-40 SYRINGE (ML) INJECTION EVERY 12 HOURS SCHEDULED
Status: DISCONTINUED | OUTPATIENT
Start: 2024-04-13 | End: 2024-04-17 | Stop reason: HOSPADM

## 2024-04-13 RX ORDER — SODIUM CHLORIDE 0.9 % (FLUSH) 0.9 %
5-40 SYRINGE (ML) INJECTION PRN
Status: DISCONTINUED | OUTPATIENT
Start: 2024-04-13 | End: 2024-04-17 | Stop reason: HOSPADM

## 2024-04-13 RX ORDER — HEPARIN SODIUM 5000 [USP'U]/ML
5000 INJECTION, SOLUTION INTRAVENOUS; SUBCUTANEOUS EVERY 8 HOURS SCHEDULED
Status: DISCONTINUED | OUTPATIENT
Start: 2024-04-13 | End: 2024-04-17 | Stop reason: HOSPADM

## 2024-04-13 RX ORDER — POLYETHYLENE GLYCOL 3350 17 G/17G
17 POWDER, FOR SOLUTION ORAL DAILY PRN
Status: DISCONTINUED | OUTPATIENT
Start: 2024-04-13 | End: 2024-04-17 | Stop reason: HOSPADM

## 2024-04-13 RX ORDER — ACETAMINOPHEN 325 MG/1
650 TABLET ORAL EVERY 6 HOURS PRN
Status: DISCONTINUED | OUTPATIENT
Start: 2024-04-13 | End: 2024-04-17 | Stop reason: HOSPADM

## 2024-04-13 RX ORDER — BUDESONIDE 0.5 MG/2ML
0.5 INHALANT ORAL
Status: DISCONTINUED | OUTPATIENT
Start: 2024-04-13 | End: 2024-04-17 | Stop reason: HOSPADM

## 2024-04-13 RX ORDER — ENOXAPARIN SODIUM 100 MG/ML
30 INJECTION SUBCUTANEOUS 2 TIMES DAILY
Status: DISCONTINUED | OUTPATIENT
Start: 2024-04-13 | End: 2024-04-13 | Stop reason: ALTCHOICE

## 2024-04-13 RX ORDER — ONDANSETRON 4 MG/1
4 TABLET, ORALLY DISINTEGRATING ORAL EVERY 8 HOURS PRN
Status: DISCONTINUED | OUTPATIENT
Start: 2024-04-13 | End: 2024-04-17 | Stop reason: HOSPADM

## 2024-04-13 RX ORDER — ALBUTEROL SULFATE 2.5 MG/3ML
2.5 SOLUTION RESPIRATORY (INHALATION) EVERY 6 HOURS PRN
Status: DISCONTINUED | OUTPATIENT
Start: 2024-04-13 | End: 2024-04-17 | Stop reason: HOSPADM

## 2024-04-13 RX ORDER — ONDANSETRON 2 MG/ML
4 INJECTION INTRAMUSCULAR; INTRAVENOUS EVERY 6 HOURS PRN
Status: DISCONTINUED | OUTPATIENT
Start: 2024-04-13 | End: 2024-04-17 | Stop reason: HOSPADM

## 2024-04-13 RX ADMIN — DEXTROSE AND SODIUM CHLORIDE: 5; 450 INJECTION, SOLUTION INTRAVENOUS at 01:32

## 2024-04-13 RX ADMIN — VANCOMYCIN HYDROCHLORIDE 1000 MG: 1 INJECTION, POWDER, LYOPHILIZED, FOR SOLUTION INTRAVENOUS at 12:37

## 2024-04-13 RX ADMIN — HEPARIN SODIUM 5000 UNITS: 5000 INJECTION INTRAVENOUS; SUBCUTANEOUS at 15:44

## 2024-04-13 RX ADMIN — VANCOMYCIN HYDROCHLORIDE 1000 MG: 1 INJECTION, POWDER, LYOPHILIZED, FOR SOLUTION INTRAVENOUS at 23:50

## 2024-04-13 RX ADMIN — CEFEPIME 2000 MG: 2 INJECTION, POWDER, FOR SOLUTION INTRAVENOUS at 18:38

## 2024-04-13 RX ADMIN — SODIUM CHLORIDE, PRESERVATIVE FREE 10 ML: 5 INJECTION INTRAVENOUS at 20:43

## 2024-04-13 RX ADMIN — DEXTROSE AND SODIUM CHLORIDE: 5; 450 INJECTION, SOLUTION INTRAVENOUS at 21:45

## 2024-04-13 RX ADMIN — VALPROATE SODIUM 500 MG: 100 INJECTION, SOLUTION INTRAVENOUS at 04:14

## 2024-04-13 RX ADMIN — HEPARIN SODIUM 5000 UNITS: 5000 INJECTION INTRAVENOUS; SUBCUTANEOUS at 21:47

## 2024-04-13 RX ADMIN — TUBERCULIN PURIFIED PROTEIN DERIVATIVE 5 UNITS: 5 INJECTION, SOLUTION INTRADERMAL at 23:52

## 2024-04-13 RX ADMIN — WATER 2000 MG: 1 INJECTION INTRAMUSCULAR; INTRAVENOUS; SUBCUTANEOUS at 01:33

## 2024-04-13 RX ADMIN — HEPARIN SODIUM 5000 UNITS: 5000 INJECTION INTRAVENOUS; SUBCUTANEOUS at 06:26

## 2024-04-13 RX ADMIN — IOPAMIDOL 100 ML: 755 INJECTION, SOLUTION INTRAVENOUS at 18:28

## 2024-04-13 RX ADMIN — CEFEPIME 2000 MG: 2 INJECTION, POWDER, FOR SOLUTION INTRAVENOUS at 09:48

## 2024-04-13 RX ADMIN — VALPROATE SODIUM 500 MG: 100 INJECTION, SOLUTION INTRAVENOUS at 21:47

## 2024-04-13 RX ADMIN — VALPROATE SODIUM 500 MG: 100 INJECTION, SOLUTION INTRAVENOUS at 15:41

## 2024-04-13 NOTE — H&P
Hospitalist History and Physical   Admit Date:  2024  5:20 PM   Name:  Nae Fabian   Age:  53 y.o.  Sex:  female  :  1970   MRN:  [id number]   Room:  [facility]    Presenting/Chief Complaint: Failure To Thrive     Reason(s) for Admission: Morbid obesity (HCC) [E66.01]  UTI (urinary tract infection) [N39.0]  Urinary tract infection without hematuria, site unspecified [N39.0]  Altered mental status, unspecified altered mental status type [R41.82]  Pressure ulcers of skin of multiple topographic sites [L89.90]     History of Present Illness:   Nae Fabian is a 53 y.o. female with PMHx of schizophrenia, morbid obesity, adult failure to thrive, who presented to the emergency room with family having center from home over concerns of decreased responsiveness and inability to get up and walk around.  The family report patient's Mom, who is her primary caregiver, recently broke her foot and the other members are finding her too complicated/labor intensive to care for at home.  Apparently she had been in the hospital several times at the beginning of  with similar complaints.  She was discharged to [facility] the last discharge, but has since been home for some time now. Up to 3 weeks ago the family told ED staff she was able to get up and walk around some.  ED evaluation revealed multiple areas of skin breakdown the worst being on her bilateral heels plantar aspect of her right toe posterolateral proximal right thigh.  She had a leukocytosis of 14,900 stable anemia at 9.4 and 29.4, of 411,000 platelets.  Chemistries showed a mild EDU with a BUN of 27 and creatinine 1.33  These were 21 and 0.9 a year ago.  Urine analysis was remarkable for UTI patient has intertrigo that appears to be candidal in the inframammary areas her abdominal folds and her bilateral groin regions.  Assessment & Plan:     Principal Problem:    UTI (urinary tract infection), possible bilateral pneumonia & concern for heel osteomyelitis  alert and participate in PT OT  Diet: Diet NPO  VTE prophylaxis: Heparin  Code status: Full Code             Hospital Problems:  Possible bilateral pneumonia  Possible bilateral heel osteomyelitis  Principal Problem:    UTI (urinary tract infection)  Active Problems:    Acute metabolic encephalopathy    Morbid obesity (HCC)    Pressure ulcers of skin of multiple topographic sites    Candidal intertrigo  Resolved Problems:    * No resolved hospital problems. *        Objective:   Patient Vitals for the past 24 hrs:   Temp Pulse Resp BP SpO2   04/12/24 2325 98.5 °F (36.9 °C) 79 18 100/62 99 %   04/12/24 2118 98.6 °F (37 °C) 78 16 -- --   04/12/24 2116 -- (!) 106 19 98/68 --   04/12/24 1945 -- 87 16 98/74 --   04/12/24 1925 -- 96 20 112/63 --   04/12/24 1858 -- 98 18 -- --   04/12/24 1845 -- 86 17 (!) 95/55 --   04/12/24 1724 99 °F (37.2 °C) 100 18 (!) 99/53 99 %       Oxygen Therapy  SpO2: 99 %  O2 Device: None (Room air)    Estimated body mass index is 54.91 kg/m² as calculated from the following:    Height as of this encounter: 1.6 m (5' 3\").    Weight as of this encounter: 140.6 kg (310 lb).  No intake or output data in the 24 hours ending 04/13/24 0025      Physical Exam:    General:    Morbidly obese, ill-appearing elderly female lying in bed arouses minimally to loudly called voice or nailbed pinch.  Head:  Normocephalic, atraumatic  Eyes:  Sclerae appear nonicteric.,  No coryza.  pupils 2 mm and minimally reactive, patient is not following commands to test extraocular muscles.  ENT:  Nares appear normal.  Dry lips & oral mucosa  Neck:  No restricted ROM.  Trachea midline   CV:   Reg.  Rhythm, tachycardic rate.  No m/r/g appreciated in the face of distant heart sounds.  No jugular venous distension.  Lungs:   CTAB.  No wheezing, rhonchi, or rales.  Symmetric expansion.  Abdomen:   Positive bowel sounds x 4 quadrants, soft, nontender, nondistended.  Extremities: No cyanosis or clubbing.  Positive greater than 4+

## 2024-04-13 NOTE — ICUWATCH
While in patients room, patient noted to have unusual movements with arms (Posturing). VSS. Lethargic per MD was like this on admission.Notified MD and head CT, seizure precautions and PRN Ativan ordered. Added to list.

## 2024-04-13 NOTE — PROGRESS NOTES
Patient admitted after midnight for concern of Acute met encephalopathy r/t UTI, probable pneumonia and worsening bilateral heel and foot wounds.     Patient more alert this AM. Will resume diet.   Continue empiric coverage antibiotics  Will pursue CT right heel as now has drainage from wound.   Consider ortho consult pending CT read.

## 2024-04-13 NOTE — PROGRESS NOTES
VANCO DAILY FOLLOW UP NOTE  [facility]   Pharmacy Pharmacokinetic Monitoring Service - Vancomycin    Consulting Provider: Yenni   Indication: BJI/UTI  Target Concentration: Goal AUC/-600 mg*hr/L  Day of Therapy: 2  Additional Antimicrobials: Cefepime    Pertinent Laboratory Values:   Wt Readings from Last 1 Encounters:   04/12/24 (!) 140.6 kg (310 lb)     Temp Readings from Last 1 Encounters:   04/13/24 97.3 °F (36.3 °C) (Axillary)     Recent Labs     04/12/24  1810 04/13/24  0852   BUN 27*  --    CREATININE 1.33* 1.07*   WBC 14.9*  --    PROCAL <0.05  --      Estimated Creatinine Clearance: 82 mL/min (A) (based on SCr of 1.07 mg/dL (H)).    Lab Results   Component Value Date/Time    VANCORANDOM 10.7 04/13/2024 08:52 AM       MRSA Nasal Swab: N/A. Non-respiratory infection    Assessment:  Date/Time Dose Concentration AUC         Note: Serum concentrations collected for AUC dosing may appear elevated if collected in close proximity to the dose administered, this is not necessarily an indication of toxicity    Plan:  Dosing recommendations based on Bayesian software  Start vancomycin 1000mg IV q12  Anticipated AUC of 550 and trough concentration of 20.4 at steady state  Renal labs as indicated   Vancomycin concentrations will be ordered as clinically appropriate   Pharmacy will continue to monitor patient and adjust therapy as indicated    Thank you for the consult,    Bryan Garcia, SamuelD, BCPS

## 2024-04-13 NOTE — PROGRESS NOTES
Assuming care for this patient at this time.  Pt remains lethargic. Responds to tactile stimuli.  No acute distress noted.

## 2024-04-13 NOTE — PROGRESS NOTES
VANCO NOTE RENAL INSUFFICIENCY PATIENT   [facility]   Pharmacy Pharmacokinetic Monitoring Service - Vancomycin    Consulting Provider: Dr. Hyman   Indication: UTI  Target Concentration: Random level ? 20 mg/L  Day of Therapy: 0  Additional Antimicrobials: cefepime    Pertinent Laboratory Values:   Wt Readings from Last 1 Encounters:   04/12/24 (!) 140.6 kg (310 lb)     Temp Readings from Last 1 Encounters:   04/12/24 98.5 °F (36.9 °C) (Oral)     Recent Labs     04/12/24  1758 04/12/24  1810   BUN  --  27*   CREATININE  --  1.33*   WBC  --  14.9*   PROCAL  --  <0.05   LACTSEPSIS 1.4  --        Lab Results   Component Value Date/Time    VANCORANDOM 15.7 02/01/2023 04:01 AM       MRSA Nasal Swab: N/A. Non-respiratory infection.    Assessment:  Date:  Dose/Freq Admin Times Level/Time:                                   Plan:  Concentration-guided dosing due to renal impairment  Start vancomycin 2500 mg x 1 dose followed by intermittent dosing  Vancomycin concentrations will be ordered as clinically appropriate   Pharmacy will continue to monitor patient and adjust therapy as indicated    Thank you for the consult,  GRAHAM GONZALEZ [facility]

## 2024-04-13 NOTE — ICUWATCH
RRT Clinical Rounding Nurse Update    Vitals:    04/12/24 2325 04/13/24 0315 04/13/24 0836 04/13/24 0848   BP: 100/62 120/62 118/65    Pulse: 79 71 97 91   Resp: 18 18 16    Temp: 98.5 °F (36.9 °C) 98 °F (36.7 °C) 97.3 °F (36.3 °C)    TempSrc: Oral Oral Axillary    SpO2: 99% 98% 96% 96%   Weight:       Height:            DETERIORATION INDEX SCORE: 28    ASSESSMENT:  Previous outreach assessment was reviewed. There have been no significant changes since previous assessment. Pt remains lethargic, responds to stimuli.  VSS.  No distress noted.  Hx schizophrenia.  Depacon IV started.  Pt on depakene oral at home.  Spoke with primary RN about plan of care.    PLAN:  Will follow per RRT Clinical Rounding Program protocol.    Alessia Nichols RN  [facility]: [phone]  [facility] [phone]

## 2024-04-13 NOTE — ACP (ADVANCE CARE PLANNING)
Advance Care Planning     General Advance Care Planning (ACP) Conversation    Date of Conversation: 4/12/2024  Conducted with: Patient with Decision Making Capacity    Healthcare Decision Maker:    Primary Decision Maker: Hema Silva - [phone]  Click here to complete Healthcare Decision Makers including selection of the Healthcare Decision Maker Relationship (ie \"Primary\").        Length of Voluntary ACP Conversation in minutes:  <16 minutes (Non-Billable)    Carline Mccracken

## 2024-04-13 NOTE — ED NOTES
Pt lethargic responds to touch and name after several attempts, vital signs unchanged, await admission

## 2024-04-13 NOTE — CARE COORDINATION
Initial note:    Chart reviewed for updates. CM met with pt and family member at bedside, introduced role, confirmed demographics and discussed d/c planning. Pt lives with her mother in a house. She is dependent with ADL's and uses a walker to ambulate. Pt is not an active  in the community. She is insured and has a PCP that she sees through her Home Health agency. Pt has a history of going to [facility] and is currently open with [facility]. Pt has a good support system. Family will provide transport. CM will continue to follow up with d/c planning.     04/13/24 8338   Service Assessment   Patient Orientation Alert and Oriented;Person;Place;Self   Cognition Alert   History Provided By Child/Family   Primary Caregiver Self   Accompanied By/Relationship Family   Support Systems Parent   Patient's Healthcare Decision Maker is: Legal Next of Kin  (Mother)   PCP Verified by CM Yes   Last Visit to PCP Within last 3 months   Prior Functional Level Assistance with the following:;Bathing;Dressing;Toileting;Mobility;Housework;Shopping   Current Functional Level Assistance with the following:;Bathing;Dressing;Toileting;Mobility   Can patient return to prior living arrangement Yes   Ability to make needs known: Good   Family able to assist with home care needs: Yes   Would you like for me to discuss the discharge plan with any other family members/significant others, and if so, who? Yes  (Mother)   Financial Resources Medicare;Medicaid   Community Resources None   Social/Functional History   Lives With Parent   Type of Home House   Bathroom Equipment None   Home Equipment Walker, rolling   ADL Assistance Independent   Ambulation Assistance Independent   Active  No   Occupation On disability   Discharge Planning   Type of Residence House   Living Arrangements Parent   Current Services Prior To Admission Durable Medical Equipment   Current DME Prior to Arrival Walker   Potential

## 2024-04-13 NOTE — ED NOTES
TRANSFER - OUT REPORT:    Verbal report given to Anahy on Nae Fabian  being transferred to [facility] for routine progression of patient care       Report consisted of patient's Situation, Background, Assessment and   Recommendations(SBAR).     Information from the following report(s) Nurse Handoff Report was reviewed with the receiving nurse.    Lines:   Peripheral IV 04/12/24 Left Antecubital (Active)   Site Assessment Clean, dry & intact 04/12/24 1813   Line Status Flushed;Brisk blood return 04/12/24 1813   Line Care Line pulled back 04/12/24 1813   Phlebitis Assessment No symptoms 04/12/24 1813   Infiltration Assessment 0 04/12/24 1813       Peripheral IV 04/12/24 Right Antecubital (Active)   Site Assessment Clean, dry & intact 04/12/24 1816   Line Status Flushed;Brisk blood return 04/12/24 1816   Phlebitis Assessment No symptoms 04/12/24 1816   Infiltration Assessment 0 04/12/24 1816        Opportunity for questions and clarification was provided.      Patient transported with:  Tech

## 2024-04-13 NOTE — ICUWATCH
RRT Clinical Rounding Nurse Update    Vitals:    04/13/24 0836 04/13/24 0848 04/13/24 1145 04/13/24 1552   BP: 118/65  115/66 (!) 120/57   Pulse: 97 91 (!) 101 (!) 106   Resp: 16  16 18   Temp: 97.3 °F (36.3 °C)  97.4 °F (36.3 °C) 97.4 °F (36.3 °C)   TempSrc: Axillary  Axillary Axillary   SpO2: 96% 96% 97% 97%   Weight:       Height:            DETERIORATION INDEX SCORE: 32    ASSESSMENT:  Previous outreach assessment was reviewed. There have been no significant changes since previous assessment. Pt alert/tearful.  Pt to CT for scan of RLE.      PLAN:  Will follow per RRT Clinical Rounding Program protocol.    Alessia Nichols RN  [facility]: [phone]  [facility]: [phone]

## 2024-04-14 PROBLEM — R53.2 FUNCTIONAL QUADRIPLEGIA (HCC): Status: ACTIVE | Noted: 2024-04-14

## 2024-04-14 LAB
ANION GAP SERPL CALC-SCNC: 7 MMOL/L (ref 2–11)
BASOPHILS # BLD: 0.1 K/UL (ref 0–0.2)
BASOPHILS NFR BLD: 1 % (ref 0–2)
BUN SERPL-MCNC: 11 MG/DL (ref 6–23)
CALCIUM SERPL-MCNC: 8.8 MG/DL (ref 8.3–10.4)
CHLORIDE SERPL-SCNC: 110 MMOL/L (ref 103–113)
CO2 SERPL-SCNC: 27 MMOL/L (ref 21–32)
CREAT SERPL-MCNC: 0.79 MG/DL (ref 0.6–1)
DIFFERENTIAL METHOD BLD: ABNORMAL
EOSINOPHIL # BLD: 0.3 K/UL (ref 0–0.8)
EOSINOPHIL NFR BLD: 6 % (ref 0.5–7.8)
ERYTHROCYTE [DISTWIDTH] IN BLOOD BY AUTOMATED COUNT: 13.8 % (ref 11.9–14.6)
GLUCOSE SERPL-MCNC: 108 MG/DL (ref 65–100)
HCT VFR BLD AUTO: 31.4 % (ref 35.8–46.3)
HGB BLD-MCNC: 9.9 G/DL (ref 11.7–15.4)
IMM GRANULOCYTES # BLD AUTO: 0.1 K/UL (ref 0–0.5)
IMM GRANULOCYTES NFR BLD AUTO: 1 % (ref 0–5)
LYMPHOCYTES # BLD: 0.9 K/UL (ref 0.5–4.6)
LYMPHOCYTES NFR BLD: 17 % (ref 13–44)
MCH RBC QN AUTO: 28.9 PG (ref 26.1–32.9)
MCHC RBC AUTO-ENTMCNC: 31.5 G/DL (ref 31.4–35)
MCV RBC AUTO: 91.8 FL (ref 82–102)
MM INDURATION, POC: 0 MM (ref 0–5)
MONOCYTES # BLD: 0.4 K/UL (ref 0.1–1.3)
MONOCYTES NFR BLD: 8 % (ref 4–12)
NEUTS SEG # BLD: 3.5 K/UL (ref 1.7–8.2)
NEUTS SEG NFR BLD: 67 % (ref 43–78)
NRBC # BLD: 0 K/UL (ref 0–0.2)
PLATELET # BLD AUTO: 393 K/UL (ref 150–450)
PMV BLD AUTO: 8.9 FL (ref 9.4–12.3)
POTASSIUM SERPL-SCNC: 3.6 MMOL/L (ref 3.5–5.1)
PPD, POC: NEGATIVE
RBC # BLD AUTO: 3.42 M/UL (ref 4.05–5.2)
SODIUM SERPL-SCNC: 144 MMOL/L (ref 136–146)
WBC # BLD AUTO: 5.2 K/UL (ref 4.3–11.1)

## 2024-04-14 PROCEDURE — 97165 OT EVAL LOW COMPLEX 30 MIN: CPT

## 2024-04-14 PROCEDURE — 6370000000 HC RX 637 (ALT 250 FOR IP): Performed by: INTERNAL MEDICINE

## 2024-04-14 PROCEDURE — 2500000003 HC RX 250 WO HCPCS: Performed by: INTERNAL MEDICINE

## 2024-04-14 PROCEDURE — 94761 N-INVAS EAR/PLS OXIMETRY MLT: CPT

## 2024-04-14 PROCEDURE — 1100000003 HC PRIVATE W/ TELEMETRY

## 2024-04-14 PROCEDURE — 2580000003 HC RX 258: Performed by: FAMILY MEDICINE

## 2024-04-14 PROCEDURE — 85025 COMPLETE CBC W/AUTO DIFF WBC: CPT

## 2024-04-14 PROCEDURE — 2580000003 HC RX 258: Performed by: INTERNAL MEDICINE

## 2024-04-14 PROCEDURE — 97535 SELF CARE MNGMENT TRAINING: CPT

## 2024-04-14 PROCEDURE — 1100000000 HC RM PRIVATE

## 2024-04-14 PROCEDURE — 36415 COLL VENOUS BLD VENIPUNCTURE: CPT

## 2024-04-14 PROCEDURE — 80048 BASIC METABOLIC PNL TOTAL CA: CPT

## 2024-04-14 PROCEDURE — 97161 PT EVAL LOW COMPLEX 20 MIN: CPT

## 2024-04-14 PROCEDURE — 2500000003 HC RX 250 WO HCPCS: Performed by: FAMILY MEDICINE

## 2024-04-14 PROCEDURE — 94640 AIRWAY INHALATION TREATMENT: CPT

## 2024-04-14 PROCEDURE — 6360000002 HC RX W HCPCS: Performed by: INTERNAL MEDICINE

## 2024-04-14 PROCEDURE — 94664 DEMO&/EVAL PT USE INHALER: CPT

## 2024-04-14 PROCEDURE — 6360000002 HC RX W HCPCS: Performed by: FAMILY MEDICINE

## 2024-04-14 PROCEDURE — 97530 THERAPEUTIC ACTIVITIES: CPT

## 2024-04-14 PROCEDURE — 94760 N-INVAS EAR/PLS OXIMETRY 1: CPT

## 2024-04-14 RX ORDER — DIVALPROEX SODIUM 500 MG/1
1000 TABLET, EXTENDED RELEASE ORAL
Status: DISCONTINUED | OUTPATIENT
Start: 2024-04-14 | End: 2024-04-16 | Stop reason: CLARIF

## 2024-04-14 RX ORDER — DULOXETIN HYDROCHLORIDE 60 MG/1
60 CAPSULE, DELAYED RELEASE ORAL DAILY
Status: DISCONTINUED | OUTPATIENT
Start: 2024-04-15 | End: 2024-04-17 | Stop reason: HOSPADM

## 2024-04-14 RX ORDER — OLANZAPINE 5 MG/1
10 TABLET ORAL NIGHTLY
Status: DISCONTINUED | OUTPATIENT
Start: 2024-04-14 | End: 2024-04-17 | Stop reason: HOSPADM

## 2024-04-14 RX ADMIN — METOPROLOL TARTRATE 25 MG: 25 TABLET, FILM COATED ORAL at 23:56

## 2024-04-14 RX ADMIN — DIVALPROEX SODIUM 1000 MG: 500 TABLET, EXTENDED RELEASE ORAL at 20:45

## 2024-04-14 RX ADMIN — HEPARIN SODIUM 5000 UNITS: 5000 INJECTION INTRAVENOUS; SUBCUTANEOUS at 04:54

## 2024-04-14 RX ADMIN — OLANZAPINE 10 MG: 5 TABLET, FILM COATED ORAL at 23:56

## 2024-04-14 RX ADMIN — BUDESONIDE 500 MCG: 0.5 INHALANT RESPIRATORY (INHALATION) at 08:29

## 2024-04-14 RX ADMIN — CEFEPIME 2000 MG: 2 INJECTION, POWDER, FOR SOLUTION INTRAVENOUS at 01:58

## 2024-04-14 RX ADMIN — CEFEPIME 2000 MG: 2 INJECTION, POWDER, FOR SOLUTION INTRAVENOUS at 12:10

## 2024-04-14 RX ADMIN — HEPARIN SODIUM 5000 UNITS: 5000 INJECTION INTRAVENOUS; SUBCUTANEOUS at 20:45

## 2024-04-14 RX ADMIN — HEPARIN SODIUM 5000 UNITS: 5000 INJECTION INTRAVENOUS; SUBCUTANEOUS at 14:58

## 2024-04-14 RX ADMIN — CEFEPIME 2000 MG: 2 INJECTION, POWDER, FOR SOLUTION INTRAVENOUS at 19:19

## 2024-04-14 RX ADMIN — VALPROATE SODIUM 500 MG: 100 INJECTION, SOLUTION INTRAVENOUS at 04:54

## 2024-04-14 RX ADMIN — BUDESONIDE 500 MCG: 0.5 INHALANT RESPIRATORY (INHALATION) at 20:18

## 2024-04-14 RX ADMIN — VANCOMYCIN HYDROCHLORIDE 1000 MG: 1 INJECTION, POWDER, LYOPHILIZED, FOR SOLUTION INTRAVENOUS at 13:18

## 2024-04-14 RX ADMIN — VALPROATE SODIUM 500 MG: 100 INJECTION, SOLUTION INTRAVENOUS at 14:57

## 2024-04-14 NOTE — PROGRESS NOTES
VANCO DAILY FOLLOW UP NOTE  [facility]   Pharmacy Pharmacokinetic Monitoring Service - Vancomycin    Consulting Provider: Yenni   Indication: BJI/UTI  Target Concentration: Goal AUC/-600 mg*hr/L  Day of Therapy: 3  Additional Antimicrobials: Cefepime    Pertinent Laboratory Values:   Wt Readings from Last 1 Encounters:   04/12/24 (!) 140.6 kg (310 lb)     Temp Readings from Last 1 Encounters:   04/14/24 98.7 °F (37.1 °C) (Axillary)     Recent Labs     04/12/24  1810 04/13/24  0852 04/14/24  0532   BUN 27*  --  11   CREATININE 1.33* 1.07* 0.79   WBC 14.9*  --  5.2   PROCAL <0.05  --   --      Estimated Creatinine Clearance: 111 mL/min (based on SCr of 0.79 mg/dL).    Lab Results   Component Value Date/Time    VANCORANDOM 10.7 04/13/2024 08:52 AM       MRSA Nasal Swab: N/A. Non-respiratory infection    Assessment:  Date/Time Dose Concentration AUC         Note: Serum concentrations collected for AUC dosing may appear elevated if collected in close proximity to the dose administered, this is not necessarily an indication of toxicity    Plan:  Current dosing regimen is therapeutic  Continue current dose  Repeat vancomycin concentrations will be ordered as clinically appropriate   Pharmacy will continue to monitor patient and adjust therapy as indicated    Thank you for the consult,  Bryan Garcia, PharmD, BCPS

## 2024-04-14 NOTE — ICUWATCH
RRT Clinical Rounding Nurse Update    Vitals:    04/13/24 1951 04/13/24 2059 04/14/24 0011 04/14/24 0412   BP: 118/60  120/64 125/68   Pulse: 100 (!) 103 99 (!) 102   Resp: 16 16 16 17   Temp: 98.1 °F (36.7 °C)  98.5 °F (36.9 °C) 98.7 °F (37.1 °C)   TempSrc: Axillary  Axillary Axillary   SpO2: 98% 98% 97% 99%   Weight:       Height:            DETERIORATION INDEX SCORE: 27    ASSESSMENT:  Previous outreach assessment was reviewed. There have been no significant changes since previous assessment. Stable overnight.  VSS.      PLAN:  Will discharge from RRT Clinical Rounding Program per protocol. Please call if needed.    Alessia Nichols RN  [facility]: [phone]  [facility]: [phone]

## 2024-04-14 NOTE — PROGRESS NOTES
ACUTE PHYSICAL THERAPY GOALS:   (Developed with and agreed upon by patient and/or caregiver.)  STG:  (1.)Ms. Fabian will move from supine to sit and sit to supine  with CONTACT GUARD ASSIST within 3 treatment day(s).    (2.)Ms. Fabian will transfer from bed to chair and chair to bed with CONTACT GUARD ASSIST using the least restrictive device within 3 treatment day(s).    (3.)Ms. Fabian will ambulate with CONTACT GUARD ASSIST for 25 feet with the least restrictive device within 3 treatment day(s).     LTG:  (1.)Ms. Fabian will move from supine to sit and sit to supine  in bed with SUPERVISION within 7 treatment day(s).    (2.)Ms. Fabian will transfer from bed to chair and chair to bed with SUPERVISION using the least restrictive device within 7 treatment day(s).    (3.)Ms. Fabian will ambulate with SUPERVISION for 50 feet with the least restrictive device within 7 treatment day(s).  ________________________________________________________________________________________________      PHYSICAL THERAPY Initial Assessment  (Link to Caseload Tracking: PT Visit Days : 1  Acknowledge Orders  Time In/Out  PT Charge Capture  Rehab Caseload Tracker    Nae Fabian is a 53 y.o. female   PRIMARY DIAGNOSIS: UTI (urinary tract infection)  Morbid obesity (HCC) [E66.01]  UTI (urinary tract infection) [N39.0]  Urinary tract infection without hematuria, site unspecified [N39.0]  Altered mental status, unspecified altered mental status type [R41.82]  Pressure ulcers of skin of multiple topographic sites [L89.90]       Reason for Referral: Generalized Muscle Weakness (M62.81)  Other lack of cordination (R27.8)  Difficulty in walking, Not elsewhere classified (R26.2)  Inpatient: Payor: [facility] MEDICARE / Plan: [employer] DUAL COMPLETE / Product Type: *No Product type* /     ASSESSMENT:     REHAB RECOMMENDATIONS:   Recommendation to date pending progress:  Setting:  Short-term Rehab    Equipment:    To Be Determined

## 2024-04-14 NOTE — PROGRESS NOTES
(See Comments)   Orientation []     Vision []     Hearing []     Cognition  []  Follows simple commands   Perception []       MOBILITY: I Mod I S SBA CGA Min Mod Max Total  NT x2 Comments:   Bed Mobility    Rolling [] [] [] [] [x] [] [] [] [] [] []    Supine to Sit [] [] [] [] [] [x] [] [] [] [] []    Scooting [] [] [] [] [x] [] [] [] [] [] []    Sit to Supine [] [] [] [] [] [] [x] [] [] [] []    Transfers    Sit to Stand [] [] [] [] [] [x] [] [] [] [] [] Rw steps to hob   Bed to Chair [] [] [] [] [] [] [] [] [] [] []    Stand to Sit [] [] [] [] [] [x] [] [] [] [] []    Tub/Shower [] [] [] [] [] [] [] [] [] [] []     Toilet [] [] [] [] [] [] [] [] [] [] []      [] [] [] [] [] [] [] [] [] [] []    I=Independent, Mod I=Modified Independent, S=Supervision/Setup, SBA=Standby Assistance, CGA=Contact Guard Assistance, Min=Minimal Assistance, Mod=Moderate Assistance, Max=Maximal Assistance, Total=Total Assistance, NT=Not Tested    ACTIVITIES OF DAILY LIVING: I Mod I S SBA CGA Min Mod Max Total NT Comments   BASIC ADLs:              Upper Body Bathing  [] [] [] [] [] [] [] [] [] []     Lower Body Bathing [] [] [] [] [] [] [] [] [] []     Toileting [] [] [] [] [] [] [] [] [x] []    Upper Body Dressing [] [] [] [] [] [] [] [] [x] []    Lower Body Dressing [] [] [] [] [] [] [] [] [x] []    Feeding [] [] [] [] [] [] [] [x] [] [] Hold cup to drink   Grooming [] [] [] [] [] [] [] [] [x] []    Personal Device Care [] [] [] [] [] [] [] [] [] []    Functional Mobility [] [] [] [] [] [x] [] [] [] [] Min with RW to hob   I=Independent, Mod I=Modified Independent, S=Supervision/Setup, SBA=Standby Assistance, CGA=Contact Guard Assistance, Min=Minimal Assistance, Mod=Moderate Assistance, Max=Maximal Assistance, Total=Total Assistance, NT=Not Tested    PLAN:   FREQUENCY/DURATION   OT Plan of Care: 3 times/week for duration of hospital stay or until stated goals are met, whichever comes first.    PROBLEM LIST:   (Skilled intervention is  medically necessary to address:)  Decreased ADL/Functional Activities  Decreased Activity Tolerance  Decreased AROM/PROM  Decreased Balance  Decreased Cognition  Decreased Coordination  Decreased Gait Ability  Decreased Safety Awareness  Decreased Strength  Decreased Transfer Abilities   INTERVENTIONS PLANNED:  (Benefits and precautions of occupational therapy have been discussed with the patient.)  Self Care Training  Therapeutic Activity  Therapeutic Exercise/HEP  Neuromuscular Re-education  Manual Therapy  Education         TREATMENT:     EVALUATION: LOW COMPLEXITY: (Untimed Charge)  The initial evaluation charge encompasses clinical chart review, objective assessment, interpretation of assessment, and skilled monitoring of the patient's response to treatment in order to develop a plan of care.     TREATMENT:   SELF CARE: (30 minutes):   Procedure(s) (per grid) utilized to improve and/or restore self-care/home management as related to dressing, toileting, grooming, self feeding, and functional mobility . Required maximal visual, verbal, manual, and tactile cueing to facilitate activities of daily living skills, compensatory activities, and adl task and walker use .           AFTER TREATMENT PRECAUTIONS: Alarm Activated, Bed, Bed/Chair Locked, Call light within reach, Needs within reach, RN notified, and Side rails x3    INTERDISCIPLINARY COLLABORATION:  RN/ PCT, PT/ PTA, and OT/ SUN    EDUCATION:  Education Given To: Patient  Education Provided: ADL Adaptive Strategies;Transfer Training;Equipment  Education Method: Demonstration;Verbal  Barriers to Learning: Cognition  Education Outcome: Continued education needed    TOTAL TREATMENT DURATION AND TIME:  Time In: 1125  Time Out: 1200  Minutes: 35    Anita Suazo, OT

## 2024-04-14 NOTE — CARE COORDINATION
Transition of Care Plan:    RUR: 12% \"low risk\"  Prior Level of Functioning: Independent with ADL's  Disposition: STR  DME needed: Defer to STR  Transportation at discharge: BLS transport  IM/IMM Medicare: IM letter needed at d/c  Is patient a  and connected with VA? N/A  Caregiver Contact: Pt's Mother yuki Silva [phone]  Discharge Caregiver contacted prior to discharge? To be contacted at d/c  Care Conference needed? No  Barriers to discharge: Medical clearance and Placement    Initial note: Chart reviewed for updates. PT/OT are recommending STR. CM met with pt at bedside, introduced role and dicussed d/c planning. Pt presented tearful and with an anxious affect but was oriented x4. CM discussed STR recommendations by PT/OT with pt. She started getting tearful again stating that she had a horrible experience when she went to [facility]. CM explained that we will not send her back there again and that she has other options that she can choose from the STR list given to her by CM. Pt is requesting to talk to her family in order to make a decision. CM will continue to follow up with d/c planning.    Carline Mccracken, MSW

## 2024-04-14 NOTE — PLAN OF CARE
Problem: Discharge Planning  Goal: Discharge to home or other facility with appropriate resources  Outcome: Progressing     Problem: Safety - Adult  Goal: Free from fall injury  Outcome: Progressing     Problem: Respiratory - Adult  Goal: Achieves optimal ventilation and oxygenation  4/14/2024 1144 by Sonam Pacheco RN  Outcome: Progressing  4/14/2024 0833 by Preethi Zamora RCP  Outcome: Progressing

## 2024-04-15 LAB
ANION GAP SERPL CALC-SCNC: 6 MMOL/L (ref 2–11)
BASOPHILS # BLD: 0 K/UL (ref 0–0.2)
BASOPHILS NFR BLD: 1 % (ref 0–2)
BUN SERPL-MCNC: 8 MG/DL (ref 6–23)
CALCIUM SERPL-MCNC: 8.4 MG/DL (ref 8.3–10.4)
CHLORIDE SERPL-SCNC: 108 MMOL/L (ref 103–113)
CO2 SERPL-SCNC: 28 MMOL/L (ref 21–32)
CREAT SERPL-MCNC: 0.69 MG/DL (ref 0.6–1)
DIFFERENTIAL METHOD BLD: ABNORMAL
EOSINOPHIL # BLD: 0.3 K/UL (ref 0–0.8)
EOSINOPHIL NFR BLD: 5 % (ref 0.5–7.8)
ERYTHROCYTE [DISTWIDTH] IN BLOOD BY AUTOMATED COUNT: 13.5 % (ref 11.9–14.6)
FERRITIN SERPL-MCNC: 285 NG/ML (ref 8–388)
GLUCOSE SERPL-MCNC: 106 MG/DL (ref 65–100)
HCT VFR BLD AUTO: 26 % (ref 35.8–46.3)
HGB BLD-MCNC: 8.3 G/DL (ref 11.7–15.4)
IMM GRANULOCYTES # BLD AUTO: 0.2 K/UL (ref 0–0.5)
IMM GRANULOCYTES NFR BLD AUTO: 3 % (ref 0–5)
IRON SATN MFR SERPL: 18 %
IRON SERPL-MCNC: 34 UG/DL (ref 35–150)
LYMPHOCYTES # BLD: 0.9 K/UL (ref 0.5–4.6)
LYMPHOCYTES NFR BLD: 14 % (ref 13–44)
MCH RBC QN AUTO: 29.2 PG (ref 26.1–32.9)
MCHC RBC AUTO-ENTMCNC: 31.9 G/DL (ref 31.4–35)
MCV RBC AUTO: 91.5 FL (ref 82–102)
MONOCYTES # BLD: 0.5 K/UL (ref 0.1–1.3)
MONOCYTES NFR BLD: 8 % (ref 4–12)
NEUTS SEG # BLD: 4.6 K/UL (ref 1.7–8.2)
NEUTS SEG NFR BLD: 71 % (ref 43–78)
NRBC # BLD: 0 K/UL (ref 0–0.2)
PLATELET # BLD AUTO: 376 K/UL (ref 150–450)
PMV BLD AUTO: 9.8 FL (ref 9.4–12.3)
POTASSIUM SERPL-SCNC: 3.7 MMOL/L (ref 3.5–5.1)
RBC # BLD AUTO: 2.84 M/UL (ref 4.05–5.2)
SODIUM SERPL-SCNC: 142 MMOL/L (ref 136–146)
TIBC SERPL-MCNC: 187 UG/DL (ref 250–450)
VANCOMYCIN SERPL-MCNC: 20.3 UG/ML
WBC # BLD AUTO: 6.5 K/UL (ref 4.3–11.1)

## 2024-04-15 PROCEDURE — 6360000002 HC RX W HCPCS: Performed by: FAMILY MEDICINE

## 2024-04-15 PROCEDURE — 94760 N-INVAS EAR/PLS OXIMETRY 1: CPT

## 2024-04-15 PROCEDURE — 36415 COLL VENOUS BLD VENIPUNCTURE: CPT

## 2024-04-15 PROCEDURE — 6370000000 HC RX 637 (ALT 250 FOR IP): Performed by: INTERNAL MEDICINE

## 2024-04-15 PROCEDURE — 83540 ASSAY OF IRON: CPT

## 2024-04-15 PROCEDURE — 2580000003 HC RX 258: Performed by: INTERNAL MEDICINE

## 2024-04-15 PROCEDURE — 80202 ASSAY OF VANCOMYCIN: CPT

## 2024-04-15 PROCEDURE — 1100000003 HC PRIVATE W/ TELEMETRY

## 2024-04-15 PROCEDURE — 94640 AIRWAY INHALATION TREATMENT: CPT

## 2024-04-15 PROCEDURE — 83550 IRON BINDING TEST: CPT

## 2024-04-15 PROCEDURE — 6360000002 HC RX W HCPCS: Performed by: INTERNAL MEDICINE

## 2024-04-15 PROCEDURE — 2580000003 HC RX 258: Performed by: FAMILY MEDICINE

## 2024-04-15 PROCEDURE — 97530 THERAPEUTIC ACTIVITIES: CPT

## 2024-04-15 PROCEDURE — 1100000000 HC RM PRIVATE

## 2024-04-15 PROCEDURE — 82728 ASSAY OF FERRITIN: CPT

## 2024-04-15 RX ORDER — MINERAL OIL/HYDROPHIL PETROLAT
OINTMENT (GRAM) TOPICAL DAILY
Status: DISCONTINUED | OUTPATIENT
Start: 2024-04-15 | End: 2024-04-17 | Stop reason: HOSPADM

## 2024-04-15 RX ADMIN — VANCOMYCIN HYDROCHLORIDE 1000 MG: 1 INJECTION, POWDER, LYOPHILIZED, FOR SOLUTION INTRAVENOUS at 11:53

## 2024-04-15 RX ADMIN — BUDESONIDE 500 MCG: 0.5 INHALANT RESPIRATORY (INHALATION) at 21:08

## 2024-04-15 RX ADMIN — DIVALPROEX SODIUM 1000 MG: 500 TABLET, EXTENDED RELEASE ORAL at 23:09

## 2024-04-15 RX ADMIN — VANCOMYCIN HYDROCHLORIDE 1000 MG: 1 INJECTION, POWDER, LYOPHILIZED, FOR SOLUTION INTRAVENOUS at 00:01

## 2024-04-15 RX ADMIN — CEFEPIME 2000 MG: 2 INJECTION, POWDER, FOR SOLUTION INTRAVENOUS at 03:13

## 2024-04-15 RX ADMIN — SODIUM CHLORIDE, PRESERVATIVE FREE 10 ML: 5 INJECTION INTRAVENOUS at 09:47

## 2024-04-15 RX ADMIN — HEPARIN SODIUM 5000 UNITS: 5000 INJECTION INTRAVENOUS; SUBCUTANEOUS at 05:11

## 2024-04-15 RX ADMIN — HEPARIN SODIUM 5000 UNITS: 5000 INJECTION INTRAVENOUS; SUBCUTANEOUS at 15:22

## 2024-04-15 RX ADMIN — METOPROLOL TARTRATE 25 MG: 25 TABLET, FILM COATED ORAL at 23:09

## 2024-04-15 RX ADMIN — HEPARIN SODIUM 5000 UNITS: 5000 INJECTION INTRAVENOUS; SUBCUTANEOUS at 23:09

## 2024-04-15 RX ADMIN — METOPROLOL TARTRATE 25 MG: 25 TABLET, FILM COATED ORAL at 09:48

## 2024-04-15 RX ADMIN — DULOXETINE HYDROCHLORIDE 60 MG: 60 CAPSULE, DELAYED RELEASE ORAL at 09:48

## 2024-04-15 RX ADMIN — CEFEPIME 2000 MG: 2 INJECTION, POWDER, FOR SOLUTION INTRAVENOUS at 19:01

## 2024-04-15 RX ADMIN — BUDESONIDE 500 MCG: 0.5 INHALANT RESPIRATORY (INHALATION) at 08:36

## 2024-04-15 RX ADMIN — CEFEPIME 2000 MG: 2 INJECTION, POWDER, FOR SOLUTION INTRAVENOUS at 09:53

## 2024-04-15 RX ADMIN — OLANZAPINE 10 MG: 5 TABLET, FILM COATED ORAL at 23:09

## 2024-04-15 NOTE — WOUND CARE
Patient seen for bilateral leg and heel wounds. Also has posterior thigh wounds. New dressings placed. Noted lymphedema bilaterally, aunt at bedside is one of her caregivers at home and stated she had a lymphedema specialist in the past and did well with this therapy. Unable to assess posterior thighs, noted pictures from admission. Patient sitting up in chair. New orders written. Discussed with Dr Sierra and received order for lymphedema specialist to evaluate for possible treatment. Patient and family updated in room. Wound team will assist as needed.

## 2024-04-15 NOTE — PROGRESS NOTES
VANCO DAILY FOLLOW UP NOTE  [facility]   Pharmacy Pharmacokinetic Monitoring Service - Vancomycin    Consulting Provider: Dr Hyman   Indication: BJI/UTI  Target Concentration: Goal AUC/-600 mg*hr/L  Day of Therapy: 4  Additional Antimicrobials: Cefepime    Pertinent Laboratory Values:   Wt Readings from Last 1 Encounters:   04/12/24 (!) 140.6 kg (310 lb)     Temp Readings from Last 1 Encounters:   04/15/24 97.5 °F (36.4 °C) (Oral)     Recent Labs     04/12/24  1758 04/12/24  1810 04/12/24  1810 04/13/24  0852 04/14/24  0532 04/14/24  2317   BUN  --  27*  --   --  11 8   CREATININE  --  1.33*   < > 1.07* 0.79 0.69   WBC  --  14.9*  --   --  5.2 6.5   PROCAL  --  <0.05  --   --   --   --    LACTSEPSIS 1.4  --   --   --   --   --     < > = values in this interval not displayed.     Estimated Creatinine Clearance: 128 mL/min (based on SCr of 0.69 mg/dL).    Lab Results   Component Value Date/Time    VANCORANDOM 20.3 04/15/2024 05:15 AM       MRSA Nasal Swab: N/A. Non-respiratory infection    Assessment:  Date/Time Dose Concentration AUC   4/15/24  0515 1000 mg q12h 20.3 524   Note: Serum concentrations collected for AUC dosing may appear elevated if collected in close proximity to the dose administered, this is not necessarily an indication of toxicity    Plan:  Current dosing regimen is therapeutic  Continue current dose  Repeat vancomycin concentrations will be ordered as clinically appropriate   Pharmacy will continue to monitor patient and adjust therapy as indicated      Thank you for the consult,    Belkis Spivey, SamuelD

## 2024-04-15 NOTE — PROGRESS NOTES
ACUTE PHYSICAL THERAPY GOALS:   (Developed with and agreed upon by patient and/or caregiver.)  STG:  (1.)Ms. Fabian will move from supine to sit and sit to supine  with CONTACT GUARD ASSIST within 3 treatment day(s).    (2.)Ms. Fabian will transfer from bed to chair and chair to bed with CONTACT GUARD ASSIST using the least restrictive device within 3 treatment day(s).    (3.)Ms. Fabian will ambulate with CONTACT GUARD ASSIST for 25 feet with the least restrictive device within 3 treatment day(s).     LTG:  (1.)Ms. Fabian will move from supine to sit and sit to supine  in bed with SUPERVISION within 7 treatment day(s).    (2.)Ms. Fabian will transfer from bed to chair and chair to bed with SUPERVISION using the least restrictive device within 7 treatment day(s).    (3.)Ms. Fabian will ambulate with SUPERVISION for 50 feet with the least restrictive device within 7 treatment day(s).  ________________________________________________________________________________________________      PHYSICAL THERAPY Daily Note and AM  (Link to Caseload Tracking: PT Visit Days : 2  Acknowledge Orders  Time In/Out  PT Charge Capture  Rehab Caseload Tracker    Nae Fabian is a 53 y.o. female   PRIMARY DIAGNOSIS: UTI (urinary tract infection)  Morbid obesity (HCC) [E66.01]  UTI (urinary tract infection) [N39.0]  Urinary tract infection without hematuria, site unspecified [N39.0]  Altered mental status, unspecified altered mental status type [R41.82]  Pressure ulcers of skin of multiple topographic sites [L89.90]       Reason for Referral: Generalized Muscle Weakness (M62.81)  Other lack of cordination (R27.8)  Difficulty in walking, Not elsewhere classified (R26.2)  Inpatient: Payor: [facility] MEDICARE / Plan: [employer] DUAL COMPLETE / Product Type: *No Product type* /     ASSESSMENT:     REHAB RECOMMENDATIONS:   Recommendation to date pending progress:  Setting:  Short-term Rehab    Equipment:    To Be Determined     ASSESSMENT:  Ms.  Caren admitted with above diagnoses.  Patient presented to the ER from home with failure to thrive. Per chart review, patient has schizophrenia with intellectual disabilities.  She lives at home with her mother and is ambulatory at baseline.  Patient's mother has broken her foot recently and other family has had difficulty caring for her.      4/15- Pt supine in bed on arrival and agreeable to therapy. She is very anxious and tearful at times. Bed mobility with SBA/CGA. Sit to stand several times with min A. She was able to transfer to chair but sat abruptly and required mod A for safety. Positioned in chair with needs in reach. Pt required more assist this date with transfer.          [facility]™ “6 Clicks” Basic Mobility Inpatient Short Form  AM-PAC Basic Mobility - Inpatient   How much help is needed turning from your back to your side while in a flat bed without using bedrails?: A Little  How much help is needed moving from lying on your back to sitting on the side of a flat bed without using bedrails?: A Little  How much help is needed moving to and from a bed to a chair?: A Little  How much help is needed standing up from a chair using your arms?: A Little  How much help is needed walking in hospital room?: A Little  How much help is needed climbing 3-5 steps with a railing?: A Lot  -PAC Inpatient Mobility Raw Score : 17  AM-PAC Inpatient T-Scale Score : 42.13  Mobility Inpatient CMS 0-100% Score: 50.57  Mobility Inpatient CMS G-Code Modifier : CK    SUBJECTIVE:   Ms. Fabian states, \"I like you\".     Social/Functional Lives With: Parent  Type of Home: House  Bathroom Equipment: None  Home Equipment: Walker, rolling  ADL Assistance: Independent  Ambulation Assistance: Independent  Active : No  Occupation: On disability    OBJECTIVE:     PAIN: VITALS / O2: PRECAUTION / LINES / DRAINS:   Pre Treatment:          Post Treatment: 0 Vitals        Oxygen      External Catheter, IV, and Telemetry

## 2024-04-15 NOTE — PROGRESS NOTES
Hospitalist Progress Note   Admit Date:  2024  5:20 PM   Name:  Nae Fabian   Age:  53 y.o.  Sex:  female  :  1970   MRN:  [id number]   Room:  [facility]    Presenting/Chief Complaint: Failure To Thrive     Reason(s) for Admission: Morbid obesity (HCC) [E66.01]  UTI (urinary tract infection) [N39.0]  Urinary tract infection without hematuria, site unspecified [N39.0]  Altered mental status, unspecified altered mental status type [R41.82]  Pressure ulcers of skin of multiple topographic sites [L89.90]     Hospital Course:     Nae Fabian is a 53 y.o. female with PMHx of schizophrenia, morbid obesity, adult failure to thrive, who presented to the emergency room with family having center from home over concerns of decreased responsiveness and inability to get up and walk around.  The family report patient's Mom, who is her primary caregiver, recently broke her foot and the other members are finding her too complicated/labor intensive to care for at home.  Apparently she had been in the hospital several times at the beginning of  with similar complaints.  She was discharged to [facility] the last discharge, but has since been home for some time now. Up to 3 weeks ago the family told ED staff she was able to get up and walk around some.  ED evaluation revealed multiple areas of skin breakdown the worst being on her bilateral heels plantar aspect of her right toe posterolateral proximal right thigh.  She had a leukocytosis of 14,900 stable anemia at 9.4 and 29.4, of 411,000 platelets.  Chemistries showed a mild EDU with a BUN of 27 and creatinine 1.33    Patient admitted for concerns of UTI with AMS, bilateral heel ulcerations      Subjective & 24hr Events:   Patient awake and alert, oriented x 3 although conversation rambling. Good spirits. Denies pain.       Assessment & Plan:     # UTI  - urine cx GNR ID/sens pending  - Continue Cefepime  - follow for final culture    # acute metabolic  100 mL IVPB (mini-bag)  2,000 mg IntraVENous Q8H    vancomycin (VANCOCIN) 1,000 mg in sodium chloride 0.9 % 250 mL IVPB (Ectr5Lcf)  1,000 mg IntraVENous Q12H    tuberculin injection 5 Units  5 Units IntraDERmal Once       Signed:  Estela Sierra DO    Part of this note may have been written by using a voice dictation software.  The note has been proof read but may still contain some grammatical/other typographical errors.

## 2024-04-16 LAB
ANION GAP SERPL CALC-SCNC: 5 MMOL/L (ref 2–11)
BACTERIA SPEC CULT: ABNORMAL
BACTERIA SPEC CULT: ABNORMAL
BASOPHILS # BLD: 0.1 K/UL (ref 0–0.2)
BASOPHILS NFR BLD: 1 % (ref 0–2)
BUN SERPL-MCNC: 7 MG/DL (ref 6–23)
CALCIUM SERPL-MCNC: 9 MG/DL (ref 8.3–10.4)
CHLORIDE SERPL-SCNC: 105 MMOL/L (ref 103–113)
CO2 SERPL-SCNC: 29 MMOL/L (ref 21–32)
CREAT SERPL-MCNC: 0.73 MG/DL (ref 0.6–1)
DIFFERENTIAL METHOD BLD: ABNORMAL
EOSINOPHIL # BLD: 0.3 K/UL (ref 0–0.8)
EOSINOPHIL NFR BLD: 5 % (ref 0.5–7.8)
ERYTHROCYTE [DISTWIDTH] IN BLOOD BY AUTOMATED COUNT: 13.2 % (ref 11.9–14.6)
FOLATE SERPL-MCNC: 5.5 NG/ML (ref 3.1–17.5)
GLUCOSE SERPL-MCNC: 93 MG/DL (ref 65–100)
HCT VFR BLD AUTO: 29.5 % (ref 35.8–46.3)
HGB BLD-MCNC: 9.5 G/DL (ref 11.7–15.4)
IMM GRANULOCYTES # BLD AUTO: 0.2 K/UL (ref 0–0.5)
IMM GRANULOCYTES NFR BLD AUTO: 3 % (ref 0–5)
LYMPHOCYTES # BLD: 1 K/UL (ref 0.5–4.6)
LYMPHOCYTES NFR BLD: 16 % (ref 13–44)
MCH RBC QN AUTO: 29.1 PG (ref 26.1–32.9)
MCHC RBC AUTO-ENTMCNC: 32.2 G/DL (ref 31.4–35)
MCV RBC AUTO: 90.2 FL (ref 82–102)
MONOCYTES # BLD: 0.5 K/UL (ref 0.1–1.3)
MONOCYTES NFR BLD: 8 % (ref 4–12)
NEUTS SEG # BLD: 4.4 K/UL (ref 1.7–8.2)
NEUTS SEG NFR BLD: 68 % (ref 43–78)
NRBC # BLD: 0 K/UL (ref 0–0.2)
PLATELET # BLD AUTO: 424 K/UL (ref 150–450)
PMV BLD AUTO: 8.8 FL (ref 9.4–12.3)
POTASSIUM SERPL-SCNC: 3.9 MMOL/L (ref 3.5–5.1)
RBC # BLD AUTO: 3.27 M/UL (ref 4.05–5.2)
SERVICE CMNT-IMP: ABNORMAL
SODIUM SERPL-SCNC: 139 MMOL/L (ref 136–146)
VIT B12 SERPL-MCNC: 459 PG/ML (ref 193–986)
WBC # BLD AUTO: 6.5 K/UL (ref 4.3–11.1)

## 2024-04-16 PROCEDURE — 6360000002 HC RX W HCPCS: Performed by: FAMILY MEDICINE

## 2024-04-16 PROCEDURE — 2580000003 HC RX 258: Performed by: INTERNAL MEDICINE

## 2024-04-16 PROCEDURE — 94760 N-INVAS EAR/PLS OXIMETRY 1: CPT

## 2024-04-16 PROCEDURE — 82607 VITAMIN B-12: CPT

## 2024-04-16 PROCEDURE — 2580000003 HC RX 258: Performed by: FAMILY MEDICINE

## 2024-04-16 PROCEDURE — 6370000000 HC RX 637 (ALT 250 FOR IP): Performed by: STUDENT IN AN ORGANIZED HEALTH CARE EDUCATION/TRAINING PROGRAM

## 2024-04-16 PROCEDURE — 97535 SELF CARE MNGMENT TRAINING: CPT

## 2024-04-16 PROCEDURE — 85025 COMPLETE CBC W/AUTO DIFF WBC: CPT

## 2024-04-16 PROCEDURE — 1100000003 HC PRIVATE W/ TELEMETRY

## 2024-04-16 PROCEDURE — 6370000000 HC RX 637 (ALT 250 FOR IP): Performed by: INTERNAL MEDICINE

## 2024-04-16 PROCEDURE — 82746 ASSAY OF FOLIC ACID SERUM: CPT

## 2024-04-16 PROCEDURE — 94640 AIRWAY INHALATION TREATMENT: CPT

## 2024-04-16 PROCEDURE — 6360000002 HC RX W HCPCS: Performed by: INTERNAL MEDICINE

## 2024-04-16 PROCEDURE — 80048 BASIC METABOLIC PNL TOTAL CA: CPT

## 2024-04-16 PROCEDURE — 36415 COLL VENOUS BLD VENIPUNCTURE: CPT

## 2024-04-16 RX ORDER — VALPROIC ACID 250 MG/1
1000 CAPSULE, LIQUID FILLED ORAL NIGHTLY
Status: DISCONTINUED | OUTPATIENT
Start: 2024-04-16 | End: 2024-04-17 | Stop reason: HOSPADM

## 2024-04-16 RX ORDER — VALPROIC ACID 250 MG/5ML
1000 SOLUTION ORAL NIGHTLY
Status: DISCONTINUED | OUTPATIENT
Start: 2024-04-16 | End: 2024-04-17 | Stop reason: HOSPADM

## 2024-04-16 RX ADMIN — SODIUM CHLORIDE: 9 INJECTION, SOLUTION INTRAVENOUS at 11:27

## 2024-04-16 RX ADMIN — HEPARIN SODIUM 5000 UNITS: 5000 INJECTION INTRAVENOUS; SUBCUTANEOUS at 05:55

## 2024-04-16 RX ADMIN — SODIUM CHLORIDE: 9 INJECTION, SOLUTION INTRAVENOUS at 17:24

## 2024-04-16 RX ADMIN — OLANZAPINE 10 MG: 5 TABLET, FILM COATED ORAL at 21:02

## 2024-04-16 RX ADMIN — SODIUM CHLORIDE, PRESERVATIVE FREE 10 ML: 5 INJECTION INTRAVENOUS at 09:06

## 2024-04-16 RX ADMIN — CEFEPIME 2000 MG: 2 INJECTION, POWDER, FOR SOLUTION INTRAVENOUS at 02:46

## 2024-04-16 RX ADMIN — METOPROLOL TARTRATE 25 MG: 25 TABLET, FILM COATED ORAL at 21:02

## 2024-04-16 RX ADMIN — HEPARIN SODIUM 5000 UNITS: 5000 INJECTION INTRAVENOUS; SUBCUTANEOUS at 13:51

## 2024-04-16 RX ADMIN — METOPROLOL TARTRATE 25 MG: 25 TABLET, FILM COATED ORAL at 09:06

## 2024-04-16 RX ADMIN — VALPROIC ACID 1000 MG: 250 SOLUTION ORAL at 21:02

## 2024-04-16 RX ADMIN — SODIUM CHLORIDE, PRESERVATIVE FREE 10 ML: 5 INJECTION INTRAVENOUS at 21:03

## 2024-04-16 RX ADMIN — Medication: at 09:07

## 2024-04-16 RX ADMIN — CEFEPIME 2000 MG: 2 INJECTION, POWDER, FOR SOLUTION INTRAVENOUS at 17:24

## 2024-04-16 RX ADMIN — VANCOMYCIN HYDROCHLORIDE 1000 MG: 1 INJECTION, POWDER, LYOPHILIZED, FOR SOLUTION INTRAVENOUS at 00:39

## 2024-04-16 RX ADMIN — BUDESONIDE 500 MCG: 0.5 INHALANT RESPIRATORY (INHALATION) at 20:42

## 2024-04-16 RX ADMIN — DULOXETINE HYDROCHLORIDE 60 MG: 60 CAPSULE, DELAYED RELEASE ORAL at 09:06

## 2024-04-16 RX ADMIN — HEPARIN SODIUM 5000 UNITS: 5000 INJECTION INTRAVENOUS; SUBCUTANEOUS at 21:02

## 2024-04-16 RX ADMIN — CEFEPIME 2000 MG: 2 INJECTION, POWDER, FOR SOLUTION INTRAVENOUS at 11:28

## 2024-04-16 RX ADMIN — VANCOMYCIN HYDROCHLORIDE 1000 MG: 1 INJECTION, POWDER, LYOPHILIZED, FOR SOLUTION INTRAVENOUS at 11:47

## 2024-04-16 NOTE — PROGRESS NOTES
VANCO DAILY FOLLOW UP NOTE  [facility]   Pharmacy Pharmacokinetic Monitoring Service - Vancomycin    Consulting Provider: Dr Hyman   Indication: BJI/UTI  Target Concentration: Goal AUC/-600 mg*hr/L  Day of Therapy: 5  Additional Antimicrobials: Cefepime    Pertinent Laboratory Values:   Wt Readings from Last 1 Encounters:   04/12/24 (!) 140.6 kg (310 lb)     Temp Readings from Last 1 Encounters:   04/16/24 97.9 °F (36.6 °C) (Oral)     Recent Labs     04/14/24  0532 04/14/24  2317 04/16/24  0618   BUN 11 8 7   CREATININE 0.79 0.69 0.73   WBC 5.2 6.5 6.5     Estimated Creatinine Clearance: 121 mL/min (based on SCr of 0.73 mg/dL).    Lab Results   Component Value Date/Time    VANCORANDOM 20.3 04/15/2024 05:15 AM       MRSA Nasal Swab: N/A. Non-respiratory infection    Assessment:  Date/Time Dose Concentration AUC   4/15/24  0515 1000 mg q12h 20.3 524   Note: Serum concentrations collected for AUC dosing may appear elevated if collected in close proximity to the dose administered, this is not necessarily an indication of toxicity    Plan:  No new labs or changes to report today  Continue current dose  Repeat vancomycin concentrations will be ordered as clinically appropriate   Pharmacy will continue to monitor patient and adjust therapy as indicated      Thank you for the consult,    Belkis Spivey, SamuelD

## 2024-04-16 NOTE — PLAN OF CARE
Problem: Discharge Planning  Goal: Discharge to home or other facility with appropriate resources  4/16/2024 1020 by David Tomlinson RN  Outcome: Progressing  4/16/2024 0248 by Domonique Peacock RN  Outcome: Progressing     Problem: Safety - Adult  Goal: Free from fall injury  4/16/2024 1020 by David Tomlinson RN  Outcome: Progressing  4/16/2024 0248 by Domonique Peacock RN  Outcome: Progressing     Problem: Respiratory - Adult  Goal: Achieves optimal ventilation and oxygenation  4/16/2024 1020 by David Tomlinson RN  Outcome: Progressing  4/16/2024 0248 by Domonique Peacock RN  Outcome: Progressing     Problem: Skin/Tissue Integrity  Goal: Absence of new skin breakdown  Description: 1.  Monitor for areas of redness and/or skin breakdown  2.  Assess vascular access sites hourly  3.  Every 4-6 hours minimum:  Change oxygen saturation probe site  4.  Every 4-6 hours:  If on nasal continuous positive airway pressure, respiratory therapy assess nares and determine need for appliance change or resting period.  4/16/2024 1020 by David Tomlinson RN  Outcome: Progressing  4/16/2024 0248 by Domonique Peacock RN  Outcome: Progressing

## 2024-04-16 NOTE — PROGRESS NOTES
Physician Progress Note      PATIENT:               TAMMI MENESES  [facility] #:                  [id number]  :                       1970  ADMIT DATE:       2024 5:20 PM  DISCH DATE:  RESPONDING  PROVIDER #:        Angela Ford MD          QUERY TEXT:    Patient admitted with UTI.  Documentation reflects \"probable pneumonia\".  If   possible, please document in the progress notes and discharge summary if   pneumonia was:    The medical record reflects the following:  Risk Factors: morbid obese, decreased mobility  Clinical Indicators: Documentation of possible bilateral pneumonia on history   and physical.  CXR, Nonspecific perihilar opacities. Atelectasis, pulmonary   edema, and multifocal pneumonia are in the differential.   Afebrile.  Treatment: Maxipime. Vancomycin.  Options provided:  -- Pneumonia confirmed  -- Pneumonia ruled out  -- Other - I will add my own diagnosis  -- Disagree - Not applicable / Not valid  -- Disagree - Clinically unable to determine / Unknown  -- Refer to Clinical Documentation Reviewer    PROVIDER RESPONSE TEXT:    Pneumonia ruled out .    Query created by: BLAYNE GE on 2024 8:08 AM      QUERY TEXT:    Patient admitted with UTI/cellulitis. Noted documentation of  functional   quadriplegia. In order to support the diagnosis of functional quadriplegia,   please include additional clinical indicators in your documentation.  Or   please document if the diagnosis of functional quadriplegia has been ruled out   after further study.    The medical record reflects the following:  Risk Factors: Morbidly obese, decreased mobility  Clinical Indicators: Per physical therapy note, She lives at home with her   mother and is ambulatory at baseline.  She also demonstrates decreased balance   and independence with mobility though she was able to stand and take steps   with use of a walker today. Per occupational therapy note, She was min assist   supine to sit. She stood with min  assist with rw and took steps to hob.  She   is currently total assist for adls. She was assisted with combing her hair and   holding her cup to drink from it. She was min assist sit to supine.  She   rolled R and L with Cga.  Treatment: PT and OT  Options provided:  -- Functional quadriplegia present as evidenced by, Please document evidence.  -- Functional quadriplegia was ruled out  -- Other - I will add my own diagnosis  -- Disagree - Not applicable / Not valid  -- Disagree - Clinically unable to determine / Unknown  -- Refer to Clinical Documentation Reviewer    PROVIDER RESPONSE TEXT:    Functional quadriplegia is present as evidenced by    Query created by: BLYANE GE on 4/16/2024 8:16 AM      Electronically signed by:  Angela Ford MD 4/16/2024 8:51 AM

## 2024-04-16 NOTE — PROGRESS NOTES
Hospitalist Progress Note   Admit Date:  2024  5:20 PM   Name:  Nae Fabian   Age:  53 y.o.  Sex:  female  :  1970   MRN:  [id number]   Room:  [facility] [place]    Presenting/Chief Complaint: Failure To Thrive     Reason(s) for Admission: Morbid obesity (HCC) [E66.01]  UTI (urinary tract infection) [N39.0]  Urinary tract infection without hematuria, site unspecified [N39.0]  Altered mental status, unspecified altered mental status type [R41.82]  Pressure ulcers of skin of multiple topographic sites [L89.90]     Hospital Course:     Nae Fabian is a 53 y.o. female with PMHx of schizophrenia, morbid obesity, adult failure to thrive, who presented to the emergency room with family having center from home over concerns of decreased responsiveness and inability to get up and walk around.  The family report patient's Mom, who is her primary caregiver, recently broke her foot and the other members are finding her too complicated/labor intensive to care for at home.  Apparently she had been in the hospital several times at the beginning of  with similar complaints.  She was discharged to [facility] the last discharge, but has since been home for some time now. Up to 3 weeks ago the family told ED staff she was able to get up and walk around some.  ED evaluation revealed multiple areas of skin breakdown the worst being on her bilateral heels plantar aspect of her right toe posterolateral proximal right thigh.  She had a leukocytosis of 14,900 stable anemia at 9.4 and 29.4, of 411,000 platelets.  Chemistries showed a mild EDU with a BUN of 27 and creatinine 1.33    Patient admitted for concerns of UTI with AMS, bilateral heel ulcerations      Subjective & 24hr Events:   Patient awake and alert. No complaints today.   Assessment & Plan:     # UTI GNR  - urine cx growing Proteus and MDR  - Continue Cefepime     # acute metabolic encephalopathy  Resolved    # EDU  - cr 1.3 on admission (baseline 0.7)  -  Granulocytes Absolute 0.2 0.0 - 0.5 K/UL   Basic Metabolic Panel w/ Reflex to MG    Collection Time: 04/16/24  6:18 AM   Result Value Ref Range    Sodium 139 136 - 146 mmol/L    Potassium 3.9 3.5 - 5.1 mmol/L    Chloride 105 103 - 113 mmol/L    CO2 29 21 - 32 mmol/L    Anion Gap 5 2 - 11 mmol/L    Glucose 93 65 - 100 mg/dL    BUN 7 6 - 23 MG/DL    Creatinine 0.73 0.6 - 1.0 MG/DL    Est, Glom Filt Rate >90 >60 ml/min/1.73m2    Calcium 9.0 8.3 - 10.4 MG/DL       No results for input(s): \"COVID19\" in the last 72 hours.    Current Meds:  Current Facility-Administered Medications   Medication Dose Route Frequency    mineral oil-hydrophilic petrolatum (AQUAPHOR) ointment   Topical Daily    divalproex (DEPAKOTE ER) extended release tablet 1,000 mg  1,000 mg Oral QHS    DULoxetine (CYMBALTA) extended release capsule 60 mg  60 mg Oral Daily    metoprolol tartrate (LOPRESSOR) tablet 25 mg  25 mg Oral BID    OLANZapine (ZYPREXA) tablet 10 mg  10 mg Oral Nightly    albuterol (PROVENTIL) (2.5 MG/3ML) 0.083% nebulizer solution 2.5 mg  2.5 mg Nebulization Q6H PRN    budesonide (PULMICORT) nebulizer suspension 500 mcg  0.5 mg Nebulization BID RT    sodium chloride flush 0.9 % injection 5-40 mL  5-40 mL IntraVENous 2 times per day    sodium chloride flush 0.9 % injection 5-40 mL  5-40 mL IntraVENous PRN    0.9 % sodium chloride infusion   IntraVENous PRN    ondansetron (ZOFRAN-ODT) disintegrating tablet 4 mg  4 mg Oral Q8H PRN    Or    ondansetron (ZOFRAN) injection 4 mg  4 mg IntraVENous Q6H PRN    acetaminophen (TYLENOL) tablet 650 mg  650 mg Oral Q6H PRN    Or    acetaminophen (TYLENOL) suppository 650 mg  650 mg Rectal Q6H PRN    polyethylene glycol (GLYCOLAX) packet 17 g  17 g Oral Daily PRN    miconazole (MICOTIN) 2 % powder   Topical BID PRN    heparin (porcine) injection 5,000 Units  5,000 Units SubCUTAneous 3 times per day    LORazepam (ATIVAN) 2 mg in sodium chloride (PF) 0.9 % 10 mL injection  2 mg IntraVENous Q4H PRN

## 2024-04-16 NOTE — PROGRESS NOTES
Physical Therapy Note:    Attempted to see patient this AM for physical therapy treatment  session. Per RN pt had recently told her she just wanted to rest right now, Patient was in supine with covers pulled up over her head. Did not disturb pt at this time,  will follow and re-attempt as schedule permits/patient available. Thank you,    ARRON CARRINGTON, PT     Rehab Caseload Tracker

## 2024-04-16 NOTE — PROGRESS NOTES
Hospitalist Progress Note   Admit Date:  2024  5:20 PM   Name:  Nae Fabian   Age:  53 y.o.  Sex:  female  :  1970   MRN:  [id number]   Room:  [facility]    Presenting/Chief Complaint: Failure To Thrive     Reason(s) for Admission: Morbid obesity (HCC) [E66.01]  UTI (urinary tract infection) [N39.0]  Urinary tract infection without hematuria, site unspecified [N39.0]  Altered mental status, unspecified altered mental status type [R41.82]  Pressure ulcers of skin of multiple topographic sites [L89.90]     Hospital Course:     Nae Fabian is a 53 y.o. female with PMHx of schizophrenia, morbid obesity, adult failure to thrive, who presented to the emergency room with family having center from home over concerns of decreased responsiveness and inability to get up and walk around.  The family report patient's Mom, who is her primary caregiver, recently broke her foot and the other members are finding her too complicated/labor intensive to care for at home.  Apparently she had been in the hospital several times at the beginning of  with similar complaints.  She was discharged to [facility] the last discharge, but has since been home for some time now. Up to 3 weeks ago the family told ED staff she was able to get up and walk around some.  ED evaluation revealed multiple areas of skin breakdown the worst being on her bilateral heels plantar aspect of her right toe posterolateral proximal right thigh.  She had a leukocytosis of 14,900 stable anemia at 9.4 and 29.4, of 411,000 platelets.  Chemistries showed a mild EDU with a BUN of 27 and creatinine 1.33    Patient admitted for concerns of UTI with AMS, bilateral heel ulcerations      Subjective & 24hr Events:   Patient awake and alert. No complaints today. Eating breakfast. No family at bedside during my eval.       Assessment & Plan:     # UTI GNR  4/15  - urine cx GNR ID/sens pending  - Continue Cefepime  - follow for final culture    # acute metabolic  11.9 - 14.6 %    Platelets 393 150 - 450 K/uL    MPV 8.9 (L) 9.4 - 12.3 FL    nRBC 0.00 0.0 - 0.2 K/uL    Differential Type AUTOMATED      Neutrophils % 67 43 - 78 %    Lymphocytes % 17 13 - 44 %    Monocytes % 8 4.0 - 12.0 %    Eosinophils % 6 0.5 - 7.8 %    Basophils % 1 0.0 - 2.0 %    Immature Granulocytes % 1 0.0 - 5.0 %    Neutrophils Absolute 3.5 1.7 - 8.2 K/UL    Lymphocytes Absolute 0.9 0.5 - 4.6 K/UL    Monocytes Absolute 0.4 0.1 - 1.3 K/UL    Eosinophils Absolute 0.3 0.0 - 0.8 K/UL    Basophils Absolute 0.1 0.0 - 0.2 K/UL    Immature Granulocytes Absolute 0.1 0.0 - 0.5 K/UL   PLEASE READ & DOCUMENT PPD TEST IN 24 HRS    Collection Time: 04/14/24 11:08 PM   Result Value Ref Range    PPD, (POC) Negative     mm Induration 0 0 - 5 mm   CBC with Auto Differential    Collection Time: 04/14/24 11:17 PM   Result Value Ref Range    WBC 6.5 4.3 - 11.1 K/uL    RBC 2.84 (L) 4.05 - 5.2 M/uL    Hemoglobin 8.3 (L) 11.7 - 15.4 g/dL    Hematocrit 26.0 (L) 35.8 - 46.3 %    MCV 91.5 82.0 - 102.0 FL    MCH 29.2 26.1 - 32.9 PG    MCHC 31.9 31.4 - 35.0 g/dL    RDW 13.5 11.9 - 14.6 %    Platelets 376 150 - 450 K/uL    MPV 9.8 9.4 - 12.3 FL    nRBC 0.00 0.0 - 0.2 K/uL    Differential Type AUTOMATED      Neutrophils % 71 43 - 78 %    Lymphocytes % 14 13 - 44 %    Monocytes % 8 4.0 - 12.0 %    Eosinophils % 5 0.5 - 7.8 %    Basophils % 1 0.0 - 2.0 %    Immature Granulocytes % 3 0.0 - 5.0 %    Neutrophils Absolute 4.6 1.7 - 8.2 K/UL    Lymphocytes Absolute 0.9 0.5 - 4.6 K/UL    Monocytes Absolute 0.5 0.1 - 1.3 K/UL    Eosinophils Absolute 0.3 0.0 - 0.8 K/UL    Basophils Absolute 0.0 0.0 - 0.2 K/UL    Immature Granulocytes Absolute 0.2 0.0 - 0.5 K/UL   Basic Metabolic Panel w/ Reflex to MG    Collection Time: 04/14/24 11:17 PM   Result Value Ref Range    Sodium 142 136 - 146 mmol/L    Potassium 3.7 3.5 - 5.1 mmol/L    Chloride 108 103 - 113 mmol/L    CO2 28 21 - 32 mmol/L    Anion Gap 6 2 - 11 mmol/L    Glucose 106 (H) 65 - 100 mg/dL

## 2024-04-16 NOTE — PROGRESS NOTES
Wound on bilateral heels and left great toe cleansed with wound cleanser, xeroform applied and wrapped in Roz.  BLE placed in rook boots.

## 2024-04-16 NOTE — PROGRESS NOTES
ACUTE OCCUPATIONAL THERAPY GOALS:   (Developed with and agreed upon by patient and/or caregiver.)  1. Patient will perform grooming with min assist.  2. Patient will perform upper body dressing with max assist  3. Patient will perform upper body  bathing with max assist..  4. Patient will perform toilet transfer with min assist with rw.  5. Patient will feed her self after set up with CGA.     Lymphedema therapy goals: Established 4/16/24  Discharge Goals: Time Frame: 7 days  1. The patient/caregiver will verbalize understanding of lymphedema precautions.  2. Patient will be independent with skin care regimen to decrease risk of cellulitis.   3. Patient will be minimal assist in lymphatic exercises.       4. Patient's bilateral lower extremity circumferential measurements will decrease on volumetric graph by 3-5 cm to maximize functional use in ADL's.    5. The patient/caregiver will be independent with home management of lymphedema.    6. Patient/caregiver will be independent donning and doffing bilateral lower extremity compression garment.    Goals to be achieved in 7 days.     OCCUPATIONAL THERAPY Daily Note and PM       OT Visit Days: 1  Acknowledge Orders  Time  OT Charge Capture  Rehab Caseload Tracker      Nae Fabian is a 53 y.o. female   PRIMARY DIAGNOSIS: UTI (urinary tract infection)  Morbid obesity (HCC) [E66.01]  UTI (urinary tract infection) [N39.0]  Urinary tract infection without hematuria, site unspecified [N39.0]  Altered mental status, unspecified altered mental status type [R41.82]  Pressure ulcers of skin of multiple topographic sites [L89.90]       Reason for Referral: Generalized Muscle Weakness (M62.81)  Other lack of cordination (R27.8)  Difficulty in walking, Not elsewhere classified (R26.2)  Other abnormalities of gait and mobility (R26.89)  Inpatient: Payor: [facility] MEDICARE / Plan: [employer] DUAL COMPLETE / Product Type: *No Product type* /     ASSESSMENT:     REHAB  during session.     Social/Functional Lives With: Parent  Type of Home: House  Bathroom Equipment: None  Home Equipment: Walker, rolling  ADL Assistance: Independent  Ambulation Assistance: Independent  Active : No  Occupation: On disability    OBJECTIVE:     LINES / DRAINS / AIRWAY: External Catheter, IV, and Telemetry     RESTRICTIONS/PRECAUTIONS:   Seizure , fall precautions    PAIN: VITALS / O2:   Pre Treatment:      0/10    Post Treatment: 0/10       Vitals          Oxygen     ra       GROSS EVALUATION: INTACT IMPAIRED   (See Comments)   UE AROM [] []Grasped walker in standing, holds her UE flexed at elbows at rest   UE PROM [] []   Strength []    Able to roll and stand with assistance   Posture / Balance []  Cga to Sba seated, min sit to stand with RW   Sensation []     Coordination []  impulsive     Tone []       Edema []    Activity Tolerance []    Fair with standing tolerance and sitting tasks   Hand Dominance R [] L []      COGNITION/  PERCEPTION: INTACT IMPAIRED   (See Comments)   Orientation []     Vision []     Hearing []     Cognition  []  Follows simple commands   Perception []       MOBILITY: I Mod I S SBA CGA Min Mod Max Total  NT x2 Comments:   Bed Mobility    Rolling [] [] [] [] [x] [] [] [] [] [] []    Supine to Sit [] [] [] [] [] [x] [] [] [] [] []    Scooting [] [] [] [] [x] [] [] [] [] [] []    Sit to Supine [] [] [] [] [] [] [x] [] [] [] []    Transfers    Sit to Stand [] [] [] [] [] [x] [] [] [] [] [] Rw steps to hob   Bed to Chair [] [] [] [] [] [] [] [] [] [] []    Stand to Sit [] [] [] [] [] [x] [] [] [] [] []    Tub/Shower [] [] [] [] [] [] [] [] [] [] []     Toilet [] [] [] [] [] [] [] [] [] [] []      [] [] [] [] [] [] [] [] [] [] []    I=Independent, Mod I=Modified Independent, S=Supervision/Setup, SBA=Standby Assistance, CGA=Contact Guard Assistance, Min=Minimal Assistance, Mod=Moderate Assistance, Max=Maximal Assistance, Total=Total Assistance, NT=Not Tested    ACTIVITIES OF

## 2024-04-16 NOTE — PLAN OF CARE
Problem: Discharge Planning  Goal: Discharge to home or other facility with appropriate resources  Outcome: Progressing     Problem: Safety - Adult  Goal: Free from fall injury  Outcome: Progressing     Problem: Respiratory - Adult  Goal: Achieves optimal ventilation and oxygenation  Outcome: Progressing     Problem: Skin/Tissue Integrity  Goal: Absence of new skin breakdown  Description: 1.  Monitor for areas of redness and/or skin breakdown  2.  Assess vascular access sites hourly  3.  Every 4-6 hours minimum:  Change oxygen saturation probe site  4.  Every 4-6 hours:  If on nasal continuous positive airway pressure, respiratory therapy assess nares and determine need for appliance change or resting period.  Outcome: Progressing

## 2024-04-17 VITALS
HEIGHT: 63 IN | WEIGHT: 293 LBS | DIASTOLIC BLOOD PRESSURE: 58 MMHG | OXYGEN SATURATION: 94 % | SYSTOLIC BLOOD PRESSURE: 125 MMHG | BODY MASS INDEX: 51.91 KG/M2 | HEART RATE: 89 BPM | TEMPERATURE: 98 F | RESPIRATION RATE: 18 BRPM

## 2024-04-17 LAB
BACTERIA SPEC CULT: NORMAL
BACTERIA SPEC CULT: NORMAL
MM INDURATION, POC: 0 MM (ref 0–5)
MRSA DNA SPEC QL NAA+PROBE: NOT DETECTED
PPD, POC: NEGATIVE
S AUREUS CPE NOSE QL NAA+PROBE: NOT DETECTED
SARS-COV-2 RDRP RESP QL NAA+PROBE: NOT DETECTED
SERVICE CMNT-IMP: NORMAL
SERVICE CMNT-IMP: NORMAL
SOURCE: NORMAL

## 2024-04-17 PROCEDURE — 87635 SARS-COV-2 COVID-19 AMP PRB: CPT

## 2024-04-17 PROCEDURE — 6360000002 HC RX W HCPCS: Performed by: INTERNAL MEDICINE

## 2024-04-17 PROCEDURE — 94760 N-INVAS EAR/PLS OXIMETRY 1: CPT

## 2024-04-17 PROCEDURE — 87641 MR-STAPH DNA AMP PROBE: CPT

## 2024-04-17 PROCEDURE — 97535 SELF CARE MNGMENT TRAINING: CPT

## 2024-04-17 PROCEDURE — 6360000002 HC RX W HCPCS: Performed by: FAMILY MEDICINE

## 2024-04-17 PROCEDURE — 97530 THERAPEUTIC ACTIVITIES: CPT

## 2024-04-17 PROCEDURE — 6370000000 HC RX 637 (ALT 250 FOR IP): Performed by: INTERNAL MEDICINE

## 2024-04-17 PROCEDURE — 6370000000 HC RX 637 (ALT 250 FOR IP): Performed by: STUDENT IN AN ORGANIZED HEALTH CARE EDUCATION/TRAINING PROGRAM

## 2024-04-17 PROCEDURE — 2580000003 HC RX 258: Performed by: INTERNAL MEDICINE

## 2024-04-17 RX ORDER — SULFAMETHOXAZOLE AND TRIMETHOPRIM 800; 160 MG/1; MG/1
1 TABLET ORAL 2 TIMES DAILY
Qty: 14 TABLET | Refills: 0 | Status: SHIPPED | OUTPATIENT
Start: 2024-04-17 | End: 2024-04-24

## 2024-04-17 RX ORDER — AMOXICILLIN AND CLAVULANATE POTASSIUM 875; 125 MG/1; MG/1
1 TABLET, FILM COATED ORAL 2 TIMES DAILY
Qty: 14 TABLET | Refills: 0 | Status: SHIPPED | OUTPATIENT
Start: 2024-04-17 | End: 2024-04-24

## 2024-04-17 RX ADMIN — HEPARIN SODIUM 5000 UNITS: 5000 INJECTION INTRAVENOUS; SUBCUTANEOUS at 05:40

## 2024-04-17 RX ADMIN — METOPROLOL TARTRATE 50 MG: 25 TABLET, FILM COATED ORAL at 08:34

## 2024-04-17 RX ADMIN — DULOXETINE HYDROCHLORIDE 60 MG: 60 CAPSULE, DELAYED RELEASE ORAL at 08:34

## 2024-04-17 RX ADMIN — CEFEPIME 2000 MG: 2 INJECTION, POWDER, FOR SOLUTION INTRAVENOUS at 05:37

## 2024-04-17 RX ADMIN — VANCOMYCIN HYDROCHLORIDE 1000 MG: 1 INJECTION, POWDER, LYOPHILIZED, FOR SOLUTION INTRAVENOUS at 02:32

## 2024-04-17 ASSESSMENT — PAIN DESCRIPTION - ORIENTATION: ORIENTATION: LEFT;RIGHT

## 2024-04-17 ASSESSMENT — PAIN DESCRIPTION - LOCATION: LOCATION: LEG

## 2024-04-17 ASSESSMENT — PAIN SCALES - GENERAL: PAINLEVEL_OUTOF10: 3

## 2024-04-17 NOTE — PROGRESS NOTES
ACUTE PHYSICAL THERAPY GOALS:   (Developed with and agreed upon by patient and/or caregiver.)  STG:  (1.)Ms. Fabian will move from supine to sit and sit to supine  with CONTACT GUARD ASSIST within 3 treatment day(s).    (2.)Ms. Fabian will transfer from bed to chair and chair to bed with CONTACT GUARD ASSIST using the least restrictive device within 3 treatment day(s).    (3.)Ms. Fabian will ambulate with CONTACT GUARD ASSIST for 25 feet with the least restrictive device within 3 treatment day(s).     LTG:  (1.)Ms. Fabian will move from supine to sit and sit to supine  in bed with SUPERVISION within 7 treatment day(s).    (2.)Ms. Fabian will transfer from bed to chair and chair to bed with SUPERVISION using the least restrictive device within 7 treatment day(s).    (3.)Ms. Fabian will ambulate with SUPERVISION for 50 feet with the least restrictive device within 7 treatment day(s).  ________________________________________________________________________________________________      PHYSICAL THERAPY Daily Note and AM  (Link to Caseload Tracking: PT Visit Days : 3  Acknowledge Orders  Time In/Out  PT Charge Capture  Rehab Caseload Tracker    Nae Fabian is a 53 y.o. female   PRIMARY DIAGNOSIS: UTI (urinary tract infection)  Morbid obesity (HCC) [E66.01]  UTI (urinary tract infection) [N39.0]  Urinary tract infection without hematuria, site unspecified [N39.0]  Altered mental status, unspecified altered mental status type [R41.82]  Pressure ulcers of skin of multiple topographic sites [L89.90]       Reason for Referral: Generalized Muscle Weakness (M62.81)  Other lack of cordination (R27.8)  Difficulty in walking, Not elsewhere classified (R26.2)  Inpatient: Payor: [facility] MEDICARE / Plan: [employer] DUAL COMPLETE / Product Type: *No Product type* /     ASSESSMENT:     REHAB RECOMMENDATIONS:   Recommendation to date pending progress:  Setting:  Short-term Rehab    Equipment:    To Be Determined Uses a RW at home  Strength.    TREATMENT GRID:   Date:  4/17/24 Date:   Date:     Activity/Exercise Parameters Parameters Parameters   SUPINE: ankle DF/PF 10          Hip AB/AD 10          Heel slides 10     SITTING: LAQ 10          Hip flex 10                 Pt requires tactile and verbal cues to perform her exs. Easily distracted    AFTER TREATMENT PRECAUTIONS: Alarm Activated, Bed, Bed/Chair Locked, Call light within reach, Needs within reach, RN notified, and Side rails x3    INTERDISCIPLINARY COLLABORATION:  RN/ PCT and PT/ PTA    EDUCATION: Education Given To: Patient  Education Provided: Role of Therapy;Plan of Care;Home Exercise Program;Precautions;Transfer Training;Fall Prevention Strategies  Education Method: Demonstration;Verbal  Education Outcome: Continued education needed    TIME IN/OUT:  Time In: 0727  Time Out: 0751  Minutes: 24    ANDREW FIGUEROA, PT

## 2024-04-17 NOTE — CARE COORDINATION
Pt is for discharge today to [facility] as planned.  Transport via [facility] around 1600.  Packet prepared to go with pt to facility.  Pt, family, and facility updated on anticipated transport time.      RN to call report to [phone] for room 214.       04/17/24 1341   Service Assessment   Patient's Healthcare Decision Maker is: Legal Next of Kin  (Mother)   Social/Functional History   Lives With Parent   Type of Home House   Bathroom Equipment None   Home Equipment Walker, rolling   ADL Assistance Independent   Ambulation Assistance Independent   Active  No   Occupation On disability   Services At/After Discharge   Transition of Care Consult (CM Consult) Discharge Planning;SNF;Transportation Assistance   Partner SNF No   Reason Why Partner SNF Not Chosen Bed availability   Services At/After Discharge Skilled Nursing Facility (SNF);Transport;In ambulance    Resource Information Provided? No   Mode of Transport at Discharge [facility] Transport Time of Discharge 1600   Confirm Follow Up Transport Other (see comment)  ([employer])   Condition of Participation: Discharge Planning   The Plan for Transition of Care is related to the following treatment goals: STR to improve patients strength, mobility, and function to return home safely.   The Patient and/or Patient Representative was provided with a Choice of Provider? Patient   The Patient and/Or Patient Representative agree with the Discharge Plan? Yes   Freedom of Choice list was provided with basic dialogue that supports the patient's individualized plan of care/goals, treatment preferences, and shares the quality data associated with the providers?  Yes       Letha Ramires, MSW, LBSW    [facility]

## 2024-04-17 NOTE — PROGRESS NOTES
ACUTE OCCUPATIONAL THERAPY GOALS:   (Developed with and agreed upon by patient and/or caregiver.)  1. Patient will perform grooming with min assist.  2. Patient will perform upper body dressing with max assist  3. Patient will perform upper body  bathing with max assist..  4. Patient will perform toilet transfer with min assist with rw.  5. Patient will feed her self after set up with CGA.     Lymphedema therapy goals: Established 4/16/24  Discharge Goals: Time Frame: 7 days  1. The patient/caregiver will verbalize understanding of lymphedema precautions.  2. Patient will be independent with skin care regimen to decrease risk of cellulitis.   3. Patient will be minimal assist in lymphatic exercises.       4. Patient's bilateral lower extremity circumferential measurements will decrease on volumetric graph by 3-5 cm to maximize functional use in ADL's.    5. The patient/caregiver will be independent with home management of lymphedema.    6. Patient/caregiver will be independent donning and doffing bilateral lower extremity compression garment.    Goals to be achieved in 7 days.     OCCUPATIONAL THERAPY Daily Note and PM       OT Visit Days: 1  Acknowledge Orders  Time  OT Charge Capture  Rehab Caseload Tracker      Nae Fabian is a 53 y.o. female   PRIMARY DIAGNOSIS: UTI (urinary tract infection)  Morbid obesity (HCC) [E66.01]  UTI (urinary tract infection) [N39.0]  Urinary tract infection without hematuria, site unspecified [N39.0]  Altered mental status, unspecified altered mental status type [R41.82]  Pressure ulcers of skin of multiple topographic sites [L89.90]       Reason for Referral: Generalized Muscle Weakness (M62.81)  Other lack of cordination (R27.8)  Difficulty in walking, Not elsewhere classified (R26.2)  Other abnormalities of gait and mobility (R26.89)  Inpatient: Payor: [facility] MEDICARE / Plan: [employer] DUAL COMPLETE / Product Type: *No Product type* /     ASSESSMENT:     REHAB

## 2024-04-17 NOTE — DISCHARGE SUMMARY
blisters are present.  Left great toe ulceration.  Skin:     No rashes.  Normal coloration  Neuro:  CN II-XII grossly intact.  Psych:  Normal mood and affect.    Signed:  Angela Ford MD    Part of this note may have been written by using a voice dictation software.  The note has been proof read but may still contain some grammatical/other typographical errors.

## 2024-04-17 NOTE — PROGRESS NOTES
VANCO DAILY FOLLOW UP NOTE  [facility]   Pharmacy Pharmacokinetic Monitoring Service - Vancomycin    Consulting Provider: Dr Hyman   Indication: BJI/UTI  Target Concentration: Goal AUC/-600 mg*hr/L  Day of Therapy: 6  Additional Antimicrobials: Cefepime    Pertinent Laboratory Values:   Wt Readings from Last 1 Encounters:   04/12/24 (!) 140.6 kg (310 lb)     Temp Readings from Last 1 Encounters:   04/17/24 98 °F (36.7 °C) (Oral)     Recent Labs     04/14/24  2317 04/16/24  0618   BUN 8 7   CREATININE 0.69 0.73   WBC 6.5 6.5     Estimated Creatinine Clearance: 121 mL/min (based on SCr of 0.73 mg/dL).    Lab Results   Component Value Date/Time    VANCORANDOM 20.3 04/15/2024 05:15 AM       MRSA Nasal Swab: N/A. Non-respiratory infection    Assessment:  Date/Time Dose Concentration AUC   4/15/24  0515 1000 mg q12h 20.3 524   Note: Serum concentrations collected for AUC dosing may appear elevated if collected in close proximity to the dose administered, this is not necessarily an indication of toxicity    Plan:  No new labs or changes to report today  Continue current dose  Repeat vancomycin concentrations will be ordered as clinically appropriate   Pharmacy will continue to monitor patient and adjust therapy as indicated      Thank you for the consult,    Belkis Spivey, SamuelD

## 2024-04-17 NOTE — CARE COORDINATION
Family accepted STR bed offer from [facility]. CM initiated prior authorization via [employer] and obtained instant approval. Details are as follows:    Auth ID: [id number]  Approval Date: 4/17/2024  Patient has been approved for 3 days.  Next Review Date: 4/19/2024    Patient will require level 2 PASSAR prior to transfer. MD and primary SW notified.

## 2024-04-17 NOTE — PROGRESS NOTES
TRANSFER - OUT REPORT:    Verbal report given to nurse Kylah on Nae Fabian  being transferred to [facility] for routine progression of patient care       Report consisted of patient's Situation, Background, Assessment and   Recommendations(SBAR).     Information from the following report(s) Nurse Handoff Report was reviewed with the receiving nurse.             Opportunity for questions and clarification was provided.

## 2024-04-22 NOTE — PROGRESS NOTES
Physician Progress Note      PATIENT:               TAMMI MENESES  [facility] #:                  [id number]  :                       1970  ADMIT DATE:       2024 5:20 PM  DISCH DATE:        2024 6:13 PM  RESPONDING  PROVIDER #:        Angela Ford MD          QUERY TEXT:    Thank you for previously responding to this query. Please document the   clinical support.    Patient admitted with UTI/cellulitis. Noted documentation of  functional   quadriplegia. In order to support the diagnosis of functional quadriplegia,   please include additional clinical indicators in your documentation.  Or   please document if the diagnosis of functional quadriplegia has been ruled out   after further study.    The medical record reflects the following:  Risk Factors: Morbidly obese, decreased mobility  Clinical Indicators: Per physical therapy note, She lives at home with her   mother and is ambulatory at baseline.  She also demonstrates decreased balance   and independence with mobility though she was able to stand and take steps   with use of a walker today. Per occupational therapy note, She was min assist   supine to sit. She stood with min assist with rw and took steps to hob.  She   is currently total assist for adls. She was assisted with combing her hair and   holding her cup to drink from it. She was min assist sit to supine.  She   rolled R and L with [facility].  Treatment: PT and OT  Options provided:  -- Functional quadriplegia present as evidenced by, Please document evidence.  -- Functional quadriplegia was ruled out  -- Other - I will add my own diagnosis  -- Disagree - Not applicable / Not valid  -- Disagree - Clinically unable to determine / Unknown  -- Refer to Clinical Documentation Reviewer    PROVIDER RESPONSE TEXT:    Functional quadriplegia was ruled out after study.    Query created by: BLAYNE GE on 2024 10:19 AM      Electronically signed by:  Angela Ford MD 2024 2:41 PM